# Patient Record
Sex: FEMALE | HISPANIC OR LATINO | Employment: UNEMPLOYED | URBAN - METROPOLITAN AREA
[De-identification: names, ages, dates, MRNs, and addresses within clinical notes are randomized per-mention and may not be internally consistent; named-entity substitution may affect disease eponyms.]

---

## 2017-01-03 ENCOUNTER — RESOLUTE PROFESSIONAL BILLING HOSPITAL PROF FEE (OUTPATIENT)
Dept: MEDSURG UNIT | Facility: MEDICAL CENTER | Age: 32
End: 2017-01-03
Payer: MEDICAID

## 2017-01-03 ENCOUNTER — APPOINTMENT (OUTPATIENT)
Dept: RADIOLOGY | Facility: MEDICAL CENTER | Age: 32
DRG: 872 | End: 2017-01-03
Attending: INTERNAL MEDICINE
Payer: MEDICAID

## 2017-01-03 ENCOUNTER — HOSPITAL ENCOUNTER (INPATIENT)
Facility: MEDICAL CENTER | Age: 32
LOS: 16 days | DRG: 872 | End: 2017-01-19
Attending: EMERGENCY MEDICINE | Admitting: INTERNAL MEDICINE
Payer: MEDICAID

## 2017-01-03 DIAGNOSIS — L03.211 FACIAL CELLULITIS: ICD-10-CM

## 2017-01-03 PROBLEM — S02.85XA ORBITAL FRACTURE (HCC): Status: ACTIVE | Noted: 2017-01-03

## 2017-01-03 LAB
ALBUMIN SERPL BCP-MCNC: 4.3 G/DL (ref 3.2–4.9)
ALBUMIN/GLOB SERPL: 1.3 G/DL
ALP SERPL-CCNC: 80 U/L (ref 30–99)
ALT SERPL-CCNC: 8 U/L (ref 2–50)
AMPHET UR QL SCN: POSITIVE
ANION GAP SERPL CALC-SCNC: 11 MMOL/L (ref 0–11.9)
AST SERPL-CCNC: 13 U/L (ref 12–45)
BARBITURATES UR QL SCN: NEGATIVE
BASOPHILS # BLD AUTO: 0.3 % (ref 0–1.8)
BASOPHILS # BLD: 0.05 K/UL (ref 0–0.12)
BENZODIAZ UR QL SCN: NEGATIVE
BILIRUB SERPL-MCNC: 0.5 MG/DL (ref 0.1–1.5)
BUN SERPL-MCNC: 5 MG/DL (ref 8–22)
BZE UR QL SCN: NEGATIVE
CALCIUM SERPL-MCNC: 9.5 MG/DL (ref 8.5–10.5)
CANNABINOIDS UR QL SCN: NEGATIVE
CHLORIDE SERPL-SCNC: 101 MMOL/L (ref 96–112)
CO2 SERPL-SCNC: 22 MMOL/L (ref 20–33)
CREAT SERPL-MCNC: 0.54 MG/DL (ref 0.5–1.4)
CRP SERPL HS-MCNC: 1.43 MG/DL (ref 0–0.75)
EOSINOPHIL # BLD AUTO: 0.01 K/UL (ref 0–0.51)
EOSINOPHIL NFR BLD: 0.1 % (ref 0–6.9)
ERYTHROCYTE [DISTWIDTH] IN BLOOD BY AUTOMATED COUNT: 41.2 FL (ref 35.9–50)
ERYTHROCYTE [SEDIMENTATION RATE] IN BLOOD BY WESTERGREN METHOD: 33 MM/HOUR (ref 0–20)
GFR SERPL CREATININE-BSD FRML MDRD: >60 ML/MIN/1.73 M 2
GLOBULIN SER CALC-MCNC: 3.2 G/DL (ref 1.9–3.5)
GLUCOSE SERPL-MCNC: 124 MG/DL (ref 65–99)
HCG SERPL QL: NEGATIVE
HCT VFR BLD AUTO: 45.8 % (ref 37–47)
HGB BLD-MCNC: 14.1 G/DL (ref 12–16)
IMM GRANULOCYTES # BLD AUTO: 0.05 K/UL (ref 0–0.11)
IMM GRANULOCYTES NFR BLD AUTO: 0.3 % (ref 0–0.9)
LYMPHOCYTES # BLD AUTO: 0.83 K/UL (ref 1–4.8)
LYMPHOCYTES NFR BLD: 4.7 % (ref 22–41)
MAGNESIUM SERPL-MCNC: 1.8 MG/DL (ref 1.5–2.5)
MCH RBC QN AUTO: 27.9 PG (ref 27–33)
MCHC RBC AUTO-ENTMCNC: 30.8 G/DL (ref 33.6–35)
MCV RBC AUTO: 90.7 FL (ref 81.4–97.8)
MDMA UR QL SCN: ABNORMAL
METHADONE UR QL SCN: NEGATIVE
MONOCYTES # BLD AUTO: 1.05 K/UL (ref 0–0.85)
MONOCYTES NFR BLD AUTO: 6 % (ref 0–13.4)
NEUTROPHILS # BLD AUTO: 15.59 K/UL (ref 2–7.15)
NEUTROPHILS NFR BLD: 88.6 % (ref 44–72)
NRBC # BLD AUTO: 0 K/UL
NRBC BLD AUTO-RTO: 0 /100 WBC
OPIATES UR QL SCN: POSITIVE
OXYCODONE UR QL SCN: NEGATIVE
PCP UR QL SCN: NEGATIVE
PLATELET # BLD AUTO: 266 K/UL (ref 164–446)
PMV BLD AUTO: 8.7 FL (ref 9–12.9)
POTASSIUM SERPL-SCNC: 4 MMOL/L (ref 3.6–5.5)
PROPOXYPH UR QL SCN: NEGATIVE
PROT SERPL-MCNC: 7.5 G/DL (ref 6–8.2)
RBC # BLD AUTO: 5.05 M/UL (ref 4.2–5.4)
SODIUM SERPL-SCNC: 134 MMOL/L (ref 135–145)
WBC # BLD AUTO: 17.6 K/UL (ref 4.8–10.8)

## 2017-01-03 PROCEDURE — 700105 HCHG RX REV CODE 258

## 2017-01-03 PROCEDURE — 70482 CT ORBIT/EAR/FOSSA W/O&W/DYE: CPT

## 2017-01-03 PROCEDURE — 700105 HCHG RX REV CODE 258: Performed by: INTERNAL MEDICINE

## 2017-01-03 PROCEDURE — 700105 HCHG RX REV CODE 258: Performed by: HOSPITALIST

## 2017-01-03 PROCEDURE — 96365 THER/PROPH/DIAG IV INF INIT: CPT

## 2017-01-03 PROCEDURE — 3E0234Z INTRODUCTION OF SERUM, TOXOID AND VACCINE INTO MUSCLE, PERCUTANEOUS APPROACH: ICD-10-PCS | Performed by: EMERGENCY MEDICINE

## 2017-01-03 PROCEDURE — 90471 IMMUNIZATION ADMIN: CPT

## 2017-01-03 PROCEDURE — 700101 HCHG RX REV CODE 250: Performed by: EMERGENCY MEDICINE

## 2017-01-03 PROCEDURE — G0480 DRUG TEST DEF 1-7 CLASSES: HCPCS

## 2017-01-03 PROCEDURE — 36415 COLL VENOUS BLD VENIPUNCTURE: CPT

## 2017-01-03 PROCEDURE — 0H91XZZ DRAINAGE OF FACE SKIN, EXTERNAL APPROACH: ICD-10-PCS | Performed by: EMERGENCY MEDICINE

## 2017-01-03 PROCEDURE — 99285 EMERGENCY DEPT VISIT HI MDM: CPT

## 2017-01-03 PROCEDURE — 700102 HCHG RX REV CODE 250 W/ 637 OVERRIDE(OP): Performed by: EMERGENCY MEDICINE

## 2017-01-03 PROCEDURE — 84703 CHORIONIC GONADOTROPIN ASSAY: CPT

## 2017-01-03 PROCEDURE — A9270 NON-COVERED ITEM OR SERVICE: HCPCS | Performed by: EMERGENCY MEDICINE

## 2017-01-03 PROCEDURE — 96361 HYDRATE IV INFUSION ADD-ON: CPT

## 2017-01-03 PROCEDURE — 85025 COMPLETE CBC W/AUTO DIFF WBC: CPT

## 2017-01-03 PROCEDURE — 700111 HCHG RX REV CODE 636 W/ 250 OVERRIDE (IP): Performed by: EMERGENCY MEDICINE

## 2017-01-03 PROCEDURE — 700111 HCHG RX REV CODE 636 W/ 250 OVERRIDE (IP): Performed by: HOSPITALIST

## 2017-01-03 PROCEDURE — 87040 BLOOD CULTURE FOR BACTERIA: CPT

## 2017-01-03 PROCEDURE — 770006 HCHG ROOM/CARE - MED/SURG/GYN SEMI*

## 2017-01-03 PROCEDURE — 90715 TDAP VACCINE 7 YRS/> IM: CPT | Performed by: EMERGENCY MEDICINE

## 2017-01-03 PROCEDURE — 80053 COMPREHEN METABOLIC PANEL: CPT

## 2017-01-03 PROCEDURE — 87186 SC STD MICRODIL/AGAR DIL: CPT

## 2017-01-03 PROCEDURE — 87077 CULTURE AEROBIC IDENTIFY: CPT

## 2017-01-03 PROCEDURE — 96375 TX/PRO/DX INJ NEW DRUG ADDON: CPT

## 2017-01-03 PROCEDURE — 700111 HCHG RX REV CODE 636 W/ 250 OVERRIDE (IP)

## 2017-01-03 PROCEDURE — 80307 DRUG TEST PRSMV CHEM ANLYZR: CPT

## 2017-01-03 PROCEDURE — 700117 HCHG RX CONTRAST REV CODE 255: Performed by: EMERGENCY MEDICINE

## 2017-01-03 PROCEDURE — 86140 C-REACTIVE PROTEIN: CPT

## 2017-01-03 PROCEDURE — 85652 RBC SED RATE AUTOMATED: CPT

## 2017-01-03 PROCEDURE — 96367 TX/PROPH/DG ADDL SEQ IV INF: CPT

## 2017-01-03 PROCEDURE — 83735 ASSAY OF MAGNESIUM: CPT

## 2017-01-03 RX ORDER — ACETAMINOPHEN 325 MG/1
650 TABLET ORAL EVERY 6 HOURS PRN
Status: DISCONTINUED | OUTPATIENT
Start: 2017-01-03 | End: 2017-01-19 | Stop reason: HOSPADM

## 2017-01-03 RX ORDER — IBUPROFEN 200 MG
400 TABLET ORAL EVERY 6 HOURS PRN
COMMUNITY
End: 2017-11-14

## 2017-01-03 RX ORDER — KETOROLAC TROMETHAMINE 30 MG/ML
30 INJECTION, SOLUTION INTRAMUSCULAR; INTRAVENOUS ONCE
Status: COMPLETED | OUTPATIENT
Start: 2017-01-03 | End: 2017-01-03

## 2017-01-03 RX ORDER — SODIUM CHLORIDE 9 MG/ML
1000 INJECTION, SOLUTION INTRAVENOUS
Status: COMPLETED | OUTPATIENT
Start: 2017-01-03 | End: 2017-01-04

## 2017-01-03 RX ORDER — LABETALOL HYDROCHLORIDE 5 MG/ML
10 INJECTION, SOLUTION INTRAVENOUS EVERY 6 HOURS PRN
Status: DISCONTINUED | OUTPATIENT
Start: 2017-01-03 | End: 2017-01-19 | Stop reason: HOSPADM

## 2017-01-03 RX ORDER — LIDOCAINE HYDROCHLORIDE 20 MG/ML
20 INJECTION, SOLUTION INFILTRATION; PERINEURAL ONCE
Status: COMPLETED | OUTPATIENT
Start: 2017-01-03 | End: 2017-01-03

## 2017-01-03 RX ORDER — DEXTROSE MONOHYDRATE 25 G/50ML
25 INJECTION, SOLUTION INTRAVENOUS
Status: DISCONTINUED | OUTPATIENT
Start: 2017-01-03 | End: 2017-01-19 | Stop reason: HOSPADM

## 2017-01-03 RX ORDER — ENEMA 19; 7 G/133ML; G/133ML
1 ENEMA RECTAL
Status: DISCONTINUED | OUTPATIENT
Start: 2017-01-04 | End: 2017-01-19 | Stop reason: HOSPADM

## 2017-01-03 RX ORDER — SODIUM CHLORIDE 9 MG/ML
2000 INJECTION, SOLUTION INTRAVENOUS CONTINUOUS
Status: DISCONTINUED | OUTPATIENT
Start: 2017-01-03 | End: 2017-01-06

## 2017-01-03 RX ORDER — AMOXICILLIN 250 MG
1 CAPSULE ORAL NIGHTLY
Status: DISCONTINUED | OUTPATIENT
Start: 2017-01-04 | End: 2017-01-19 | Stop reason: HOSPADM

## 2017-01-03 RX ORDER — AMOXICILLIN 250 MG
1 CAPSULE ORAL
Status: DISCONTINUED | OUTPATIENT
Start: 2017-01-04 | End: 2017-01-19 | Stop reason: HOSPADM

## 2017-01-03 RX ORDER — DOCUSATE SODIUM 100 MG/1
100 CAPSULE, LIQUID FILLED ORAL EVERY MORNING
Status: DISCONTINUED | OUTPATIENT
Start: 2017-01-04 | End: 2017-01-19 | Stop reason: HOSPADM

## 2017-01-03 RX ORDER — CLINDAMYCIN PHOSPHATE 900 MG/50ML
900 INJECTION, SOLUTION INTRAVENOUS ONCE
Status: COMPLETED | OUTPATIENT
Start: 2017-01-03 | End: 2017-01-03

## 2017-01-03 RX ORDER — BISACODYL 10 MG
10 SUPPOSITORY, RECTAL RECTAL
Status: DISCONTINUED | OUTPATIENT
Start: 2017-01-04 | End: 2017-01-19 | Stop reason: HOSPADM

## 2017-01-03 RX ORDER — HYDROCODONE BITARTRATE AND ACETAMINOPHEN 5; 325 MG/1; MG/1
2 TABLET ORAL EVERY 6 HOURS PRN
Status: DISCONTINUED | OUTPATIENT
Start: 2017-01-03 | End: 2017-01-19 | Stop reason: HOSPADM

## 2017-01-03 RX ORDER — TOBRAMYCIN AND DEXAMETHASONE 3; 1 MG/ML; MG/ML
1 SUSPENSION/ DROPS OPHTHALMIC EVERY 6 HOURS
Status: DISCONTINUED | OUTPATIENT
Start: 2017-01-03 | End: 2017-01-04

## 2017-01-03 RX ORDER — LACTULOSE 20 G/30ML
30 SOLUTION ORAL
Status: DISCONTINUED | OUTPATIENT
Start: 2017-01-04 | End: 2017-01-19 | Stop reason: HOSPADM

## 2017-01-03 RX ADMIN — CEFTRIAXONE 2 G: 2 INJECTION, POWDER, FOR SOLUTION INTRAMUSCULAR; INTRAVENOUS at 15:41

## 2017-01-03 RX ADMIN — LIDOCAINE HYDROCHLORIDE 20 ML: 20 INJECTION, SOLUTION INFILTRATION; PERINEURAL at 10:30

## 2017-01-03 RX ADMIN — VANCOMYCIN HYDROCHLORIDE 1700 MG: 100 INJECTION, POWDER, LYOPHILIZED, FOR SOLUTION INTRAVENOUS at 16:13

## 2017-01-03 RX ADMIN — KETOROLAC TROMETHAMINE 30 MG: 30 INJECTION, SOLUTION INTRAMUSCULAR; INTRAVENOUS at 11:21

## 2017-01-03 RX ADMIN — IOHEXOL 75 ML: 350 INJECTION, SOLUTION INTRAVENOUS at 14:37

## 2017-01-03 RX ADMIN — CLINDAMYCIN IN 5 PERCENT DEXTROSE 900 MG: 18 INJECTION, SOLUTION INTRAVENOUS at 11:22

## 2017-01-03 RX ADMIN — SODIUM CHLORIDE 1000 ML: 9 INJECTION, SOLUTION INTRAVENOUS at 13:34

## 2017-01-03 RX ADMIN — HYDROCODONE BITARTRATE AND ACETAMINOPHEN 2 TABLET: 5; 325 TABLET ORAL at 10:08

## 2017-01-03 RX ADMIN — CLOSTRIDIUM TETANI TOXOID ANTIGEN (FORMALDEHYDE INACTIVATED), CORYNEBACTERIUM DIPHTHERIAE TOXOID ANTIGEN (FORMALDEHYDE INACTIVATED), BORDETELLA PERTUSSIS TOXOID ANTIGEN (GLUTARALDEHYDE INACTIVATED), BORDETELLA PERTUSSIS FILAMENTOUS HEMAGGLUTININ ANTIGEN (FORMALDEHYDE INACTIVATED), BORDETELLA PERTUSSIS PERTACTIN ANTIGEN, AND BORDETELLA PERTUSSIS FIMBRIAE 2/3 ANTIGEN 0.5 ML: 5; 2; 2.5; 5; 3; 5 INJECTION, SUSPENSION INTRAMUSCULAR at 10:06

## 2017-01-03 ASSESSMENT — LIFESTYLE VARIABLES
ALCOHOL_USE: YES
EVER HAD A DRINK FIRST THING IN THE MORNING TO STEADY YOUR NERVES TO GET RID OF A HANGOVER: NO
EVER FELT BAD OR GUILTY ABOUT YOUR DRINKING: NO
TOTAL SCORE: 0
EVER_SMOKED: NEVER
TOTAL SCORE: 0
TOTAL SCORE: 0
HAVE YOU EVER FELT YOU SHOULD CUT DOWN ON YOUR DRINKING: NO
ON A TYPICAL DAY WHEN YOU DRINK ALCOHOL HOW MANY DRINKS DO YOU HAVE: 4
HAVE PEOPLE ANNOYED YOU BY CRITICIZING YOUR DRINKING: NO
CONSUMPTION TOTAL: POSITIVE
EVER_SMOKED: NEVER
HOW MANY TIMES IN THE PAST YEAR HAVE YOU HAD 5 OR MORE DRINKS IN A DAY: 5
AVERAGE NUMBER OF DAYS PER WEEK YOU HAVE A DRINK CONTAINING ALCOHOL: 2

## 2017-01-03 ASSESSMENT — PAIN SCALES - GENERAL
PAINLEVEL_OUTOF10: 0
PAINLEVEL_OUTOF10: 0
PAINLEVEL_OUTOF10: 3

## 2017-01-03 ASSESSMENT — ENCOUNTER SYMPTOMS
LOSS OF CONSCIOUSNESS: 0
SENSORY CHANGE: 0
EYE PAIN: 1
CHILLS: 0
MUSCULOSKELETAL NEGATIVE: 1
EYE DISCHARGE: 0
NAUSEA: 0
DIARRHEA: 0
DIZZINESS: 0
PHOTOPHOBIA: 0
HEADACHES: 0
BLURRED VISION: 0
DOUBLE VISION: 0
SHORTNESS OF BREATH: 0
PSYCHIATRIC NEGATIVE: 1
HEARTBURN: 0
COUGH: 0
TINGLING: 0
FEVER: 0
FOCAL WEAKNESS: 0
NECK PAIN: 0
BLOOD IN STOOL: 0
SPEECH CHANGE: 0
SPUTUM PRODUCTION: 0
EYE REDNESS: 1
ABDOMINAL PAIN: 0
DIAPHORESIS: 0
PALPITATIONS: 0
VOMITING: 0
WEIGHT LOSS: 0
HEADACHES: 1
SORE THROAT: 0

## 2017-01-03 ASSESSMENT — COPD QUESTIONNAIRES
DURING THE PAST 4 WEEKS HOW MUCH DID YOU FEEL SHORT OF BREATH: NONE/LITTLE OF THE TIME
COPD SCREENING SCORE: 0
HAVE YOU SMOKED AT LEAST 100 CIGARETTES IN YOUR ENTIRE LIFE: NO/DON'T KNOW
DO YOU EVER COUGH UP ANY MUCUS OR PHLEGM?: NO/ONLY WITH OCCASIONAL COLDS OR INFECTIONS

## 2017-01-03 NOTE — ED PROVIDER NOTES
ED Provider Note    Scribed for Kev Castrejon M.D. by Josefa Garza. 1/3/2017, 9:57 AM.    Primary care provider: Pcp Pt States None  Means of arrival: Walk-in   History obtained from: Patient   History limited by: None     CHIEF COMPLAINT  Chief Complaint   Patient presents with   • Lump     forehead       HPI  Rola Carrillo is a 31 y.o. female who presents to the Emergency Department with a swollen lump to the left forehead above the eye. Patient states this began as a small pimple-like bump. This morning she felt pain to the left side of her forehead now by her ear.  The. Her swelling has greatly worsened since onset yesterday. Currently, the entire left eye area is swollen. Her pain is not exacerbated when she moves her eye.  As a fevers or chills.  Denies any visual changes.  Denies any headache.    Patient is allergic to penicillin. She denies history of diabetes mellitus or hypertension. She has not had a tetanus shot in the last 5 years.     REVIEW OF SYSTEMS  Review of Systems   HENT:        Positive swelling to left forehead above eye.    Eyes: Positive for pain.   All other systems reviewed and are negative.      PAST MEDICAL HISTORY   has a past medical history of Anxiety (02/2010) and Depression (02/2010).    SURGICAL HISTORY   has past surgical history that includes repeat c section (5/31/2012).    SOCIAL HISTORY  Social History   Substance Use Topics   • Smoking status: Never Smoker    • Smokeless tobacco: Never Used   • Alcohol Use: No      History   Drug Use   • 2.00 per week   • Special: Cocaine, Methamphetamines, Oral,      Comment: clean x4 months, hx of meth       FAMILY HISTORY  Family History   Problem Relation Age of Onset   • Hypertension Mother    • Diabetes Maternal Grandmother      pills   • Hypertension Maternal Grandmother    • Heart Disease Maternal Grandmother    • Diabetes Maternal Grandfather      pills   • Stroke Maternal Grandfather    • Heart Disease Maternal Grandfather   "  • Diabetes Maternal Aunt      pills   • Diabetes Maternal Aunt      pills   • Cancer Child      neuroblastoma       CURRENT MEDICATIONS  No current facility-administered medications on file prior to encounter.     No current outpatient prescriptions on file prior to encounter.       ALLERGIES  Allergies   Allergen Reactions   • Amoxicillin Hives and Swelling   • Penicillins Hives and Swelling       PHYSICAL EXAM  VITAL SIGNS: /76 mmHg  Pulse 88  Temp(Src) 36.5 °C (97.7 °F) (Temporal)  Resp 18  Ht 1.575 m (5' 2.01\")  Wt 68.9 kg (151 lb 14.4 oz)  BMI 27.78 kg/m2  SpO2 100%  LMP 09/18/2011  Vitals reviewed.  Constitutional: Well developed, Well nourished, No acute distress, Non-toxic appearance.   HENT: Normocephalic, Atraumatic, Bilateral external ears normal, Oropharynx moist, No oral exudates, Nose normal.  She has multiple or head on the left side near the hairline.  With some erythema over the forehead.  This stays above the eyebrow.  This is swelling the tracks down her hair on the left side.  She is swellingoftheuppereyelidoraroundtheeye.  Eyes: PERRL, EOMI, Conjunctiva normal, No discharge. Swelling above left eye.  Eye exam is normal Neck: Normal range of motion, No tenderness, Supple, No stridor.   Cardiovascular: Normal heart rate, Normal rhythm, No murmurs, No rubs, No gallops.   Thorax & Lungs: Normal breath sounds, No respiratory distress, No wheezing   Abdomen: Bowel sounds normal, Soft, No tenderness  Skin: Warm, Dry, No erythema, No rash.   Back: No tenderness, No CVA tenderness.   Neurologic: Awake, alert, nontoxic, moves all extremities symmetrically.  No focal deficits  Psychiatric: Affect normal    COURSE & MEDICAL DECISION MAKING  Pertinent Labs & Imaging studies reviewed. (See chart for details)    =    9:57 AM Patient seen and examined at bedside. The patient presents with a lump to the left forehead above the eye, and the differential diagnosis includes but is not limited to, " facial cellulitis with abscess.*. Patient will be treated with Toradol 30 mg IV, Cleocin 900 mg IV, Xylocaine 2% 20 mL IV, Norco 325 mg oral, and Adacel 0.5 mL IV for her symptoms.      10:43 AM Upon reassessment, Xylocaine 2% injection was given.  Patient is a small abscess.  DX she received some pain medicine.  Small amount of pus was expressed.    12:43 PM Upon reassessment, patient is resting in bed. Patient reports feeling improved, however, her swelling appears to be exacerbated.  Patient was here for couple hours.  She was here for a couple of hours, states usual type.  She was up all night.  She received pain medicines and over the course of her stay, she felt much better, was awake, alert, had a clear sensorium..  I, of course, considered a facial cellulitis versus a more ominous process like a sinus thrombosis, or orbital or periorbital celluliti  12:50 PM Paged R internal medicine.     12:52 PM Paged hospitalist.     2:55 PM Patient has a pressure of 26 in the left eyes.  Clinical she showed no signs initially orbital cellulitis.  The eye itself was not red, or chemotic.  It was not proptotic and she had no real swelling to the upper eyelid.  While here, she developed swollen to the upper eyelid the point where she could barely open her eye and on reassessment, her eye appears much more chemotic.  She'll receive antibiotics will be admitted for facial cellulitis.  CT was added and it was that she might have some inflammation in the orbital area concerning for periorbital or retro-orbital cellulitis.  She is given broader spectrum antibiotics.  Phology was consulted.    3:03 PM Paged ophthalmology.      3:04 PM I left a message for Dr. García (ophthalmology) regarding the patient's condition.     3:30 PM I discussed the patient's condition with [facility] internal medicine who will admit the patient.     Ophthalmology will see the patient.  She may have orbital cellulitis.  Pressure was obtained by me with a  Julio C-Pen on multiple occasions was at 26-29 range with an error of less than 5%.  She has no eye pain at 330 p.m., she has normal range of motion of the eye without signs of cranial nerve palsy, and, only mildly elevated pressure.  She does appear proptotic on CT.  She'll be admitted in guarded condition for workup and treatment of orbital cellulitis.  She'll be treated aggressively with antibiotics.    DISPOSITION:  Patient will be admitted to [facility] [place] internal medicine in guarded condition.      FINAL IMPRESSION  1. Facial cellulitis          Josefa ARENAS (Scribe), am scribing for, and in the presence of, Kev Castrejon M.D..    Electronically signed by: Josefa Garza ([place]), 1/3/2017    Kev ARENAS M.D. personally performed the services described in this documentation, as scribed by Josefa Garza in my presence, and it is both accurate and complete.    The note accurately reflects work and decisions made by me.  Kev Castrejon  1/3/2017  6:45 PM

## 2017-01-03 NOTE — PROGRESS NOTES
"Pharmacy Kinetics 31 y.o. female on vancomycin day # 1   1/3/2017    Currently on Vancomycin 1700 mg IV x1 followed by 1000 mg iv q8hr    Indication for Treatment: rule out orbital cellulitis    Pertinent history per medical record: Admitted on 1/3/2017 for facial cellulitis. Patient with left forehead swelling above eye. She thought it was a pimple, but today is much larger and painful. CT orbits with possible cellulitis.    Other antibiotics:  Ceftriaxone 2 g IV q24h    Allergies: Amoxicillin and Penicillins     List concerns for renal function: none at this time    Pertinent cultures to date:   Blood cx ordered, but not yet collected    CT orbit 1/3/17:  1.  Induration of the left preorbital, supraorbital and infraorbital soft tissues as well as some asymmetric enlargement of the left extraocular muscles and some inflammatory stranding of the intraconal fat most likely representing orbital cellulitis. There is no evidence of post septal fluid collection.    Recent Labs      17   1055   WBC  17.6*   NEUTSPOLYS  88.60*     Recent Labs      17   1055   BUN  5*   CREATININE  0.54   ALBUMIN  4.3     Blood pressure 123/76, pulse 83, temperature 36.5 °C (97.7 °F), temperature source Temporal, resp. rate 22, height 1.575 m (5' 2.01\"), weight 68.9 kg (151 lb 14.4 oz), last menstrual period 2011, SpO2 100 %. Temp (24hrs), Av.5 °C (97.7 °F), Min:36.5 °C (97.7 °F), Max:36.5 °C (97.7 °F)      A/P   1. Vancomycin dose change: new start  2. Next vancomycin level:  2-3 days (not yet started)  3. Goal trough:  12-16 mcg/mL  4. Comments: Patient with questionable facial cellulitis. Young with no real risk of accumulation. Will start vanco per protocol and hope to de-escalate in a few days.    Kali Tate, PharmD, BCPS        "

## 2017-01-03 NOTE — ED NOTES
Patient sleeping when RN in room, responds to verbal stimuli. Patient states did not sleep well last night.

## 2017-01-03 NOTE — IP AVS SNAPSHOT
1/19/2017          Rola Carrillo  [address] [place] [address]    Dear Rola:    [facility] wants to ensure your discharge home is safe and you or your loved ones have had all your questions answered regarding your care after you leave the hospital.    You may receive a telephone call within two days of your discharge.  This call is to make certain you understand your discharge instructions as well as ensure we provided you with the best care possible during your stay with us.     The call will only last approximately 3-5 minutes and will be done by a nurse.    Once again, we want to ensure your discharge home is safe and that you have a clear understanding of any next steps in your care.  If you have any questions or concerns, please do not hesitate to contact us, we are here for you.  Thank you for choosing [facility] for your healthcare needs.    Sincerely,    Gerald Mac    [facility]

## 2017-01-03 NOTE — IP AVS SNAPSHOT
" After Visit Summary                                                                                                                  Name:Rola Carrillo  Medical Record Number:[id number]  CSN: [id number]    YOB: 1985   Age: 31 y.o.  Sex: female  HT:1.575 m (5' 2.01\") WT: 68.9 kg (151 lb 14.4 oz)          Admit Date: 1/3/2017     Discharge Date:   Today's Date: 1/19/2017  Attending Doctor:  DONA العراقي*                  Allergies:  Amoxicillin and Penicillins            Discharge Instructions       Discharge Instructions    Discharged to home by car with relative. Discharged via walking, hospital escort: Yes.  Special equipment needed: Not Applicable    Be sure to schedule a follow-up appointment with your primary care doctor or any specialists as instructed.     Discharge Plan:   Diet Plan: Discussed (regular)  Activity Level: Discussed (as tolerated)  Confirmed Follow up Appointment: Patient to Call and Schedule Appointment  Confirmed Symptoms Management: Discussed  Medication Reconciliation Updated: Yes  Influenza Vaccine Indication: Patient Refuses    I understand that a diet low in cholesterol, fat, and sodium is recommended for good health. Unless I have been given specific instructions below for another diet, I accept this instruction as my diet prescription.   Other diet: regular    Special Instructions: None    · Is patient discharged on Warfarin / Coumadin?   No     · Is patient Post Blood Transfusion?  No    Depression / Suicide Risk    As you are discharged from this [facility] facility, it is important to learn how to keep safe from harming yourself.    Recognize the warning signs:  · Abrupt changes in personality, positive or negative- including increase in energy   · Giving away possessions  · Change in eating patterns- significant weight changes-  positive or negative  · Change in sleeping patterns- unable to sleep or sleeping all the time   · Unwillingness or inability " to communicate  · Depression  · Unusual sadness, discouragement and loneliness  · Talk of wanting to die  · Neglect of personal appearance   · Rebelliousness- reckless behavior  · Withdrawal from people/activities they love  · Confusion- inability to concentrate     If you or a loved one observes any of these behaviors or has concerns about self-harm, here's what you can do:  · Talk about it- your feelings and reasons for harming yourself  · Remove any means that you might use to hurt yourself (examples: pills, rope, extension cords, firearm)  · Get professional help from the community (Mental Health, Substance Abuse, psychological counseling)  · Do not be alone:Call your Safe Contact- someone whom you trust who will be there for you.  · Call your local CRISIS HOTLINE 424-8731 or 369-679-9069  · Call your local Children's Mobile Crisis Response Team [place] (598) 922-7331 or www.[employer] [facility]  · Call the toll free National Suicide Prevention Hotlines   · National Suicide Prevention Lifeline 674-523-AIOQ (1877)  · [employer] Hope Line Network 800-SUICIDE (415-9297)        Cellulitis  Cellulitis is an infection of the skin and the tissue beneath it. The infected area is usually red and tender. Cellulitis occurs most often in the arms and lower legs.   CAUSES   Cellulitis is caused by bacteria that enter the skin through cracks or cuts in the skin. The most common types of bacteria that cause cellulitis are staphylococci and streptococci.  SIGNS AND SYMPTOMS   · Redness and warmth.  · Swelling.  · Tenderness or pain.  · Fever.  DIAGNOSIS   Your health care provider can usually determine what is wrong based on a physical exam. Blood tests may also be done.  TREATMENT   Treatment usually involves taking an antibiotic medicine.  HOME CARE INSTRUCTIONS   · Take your antibiotic medicine as directed by your health care provider. Finish the antibiotic even if you start to feel better.  · Keep the infected arm or leg  elevated to reduce swelling.  · Apply a warm cloth to the affected area up to 4 times per day to relieve pain.  · Take medicines only as directed by your health care provider.  · Keep all follow-up visits as directed by your health care provider.  SEEK MEDICAL CARE IF:   · You notice red streaks coming from the infected area.  · Your red area gets larger or turns dark in color.  · Your bone or joint underneath the infected area becomes painful after the skin has healed.  · Your infection returns in the same area or another area.  · You notice a swollen bump in the infected area.  · You develop new symptoms.  · You have a fever.  SEEK IMMEDIATE MEDICAL CARE IF:   · You feel very sleepy.  · You develop vomiting or diarrhea.  · You have a general ill feeling (malaise) with muscle aches and pains.  MAKE SURE YOU:   · Understand these instructions.  · Will watch your condition.  · Will get help right away if you are not doing well or get worse.     This information is not intended to replace advice given to you by your health care provider. Make sure you discuss any questions you have with your health care provider.     Document Released: 09/27/2006 Document Revised: 01/08/2016 Document Reviewed: 03/04/2013  [employer] Interactive Patient Education ©2016 [employer]      MRSA Infection, Adult  MRSA stands for methicillin-resistant Staphylococcus aureus. This type of infection is caused by Staphylococcus aureus bacteria that are no longer affected by the medicines used to kill them (drug resistant). Staphylococcus (staph) bacteria are normally found on the skin or in the nose of healthy people. In most cases, these bacteria do not cause infection. But if these resistant bacteria enter your body through a cut or sore, they can cause a serious infection on your skin or in other parts of your body. There is a slight chance that the staph on your skin or in your nose is MRSA.  There are two types of MRSA  infections:  · Hospital-acquired MRSA is bacteria that you get in the hospital.  · Community-acquired MRSA is bacteria that you get somewhere other than in a hospital.  RISK FACTORS  Hospital-acquired MRSA is more common. You could be at risk for this infection if you are in the hospital and you:  · Have surgery or a procedure.  · Have an IV access or a catheter tube placed in your body.  · Have weak resistance to germs (weakened immune system).  · Are elderly.  · Are on kidney dialysis.  You could be at risk for community-acquired MRSA if you have a break in your skin and come into contact with MRSA. This may happen if you:  · Play sports where there is skin-to-skin contact.  · Live in a crowded setting, like a dormitory or a  barracks.  · Share towels, razors, or sports equipment with other people.  SYMPTOMS   Symptoms of hospital-acquired MRSA depend on where MRSA has spread. Symptoms may include:  · Wound infection.  · Skin infection.  · Rash.  · Pneumonia.  · Fever and chills.  · Difficulty breathing.  · Chest pain.   Community-acquired MRSA is most likely to start as a scratch or cut that becomes infected. Symptoms may include:  · A pus-filled pimple.  · A boil on your skin.  · Pus draining from your skin.  · A sore (abscess) under your skin or somewhere in your body.  · Fever with or without chills.  DIAGNOSIS   The diagnosis of MRSA is made by taking a sample from an infected area and sending it to a lab for testing. A  can grow (culture) MRSA and check it under a microscope. The cultured MRSA can be tested to see which type of antibiotic medicine will work to treat it. Newer tests can identify MRSA more quickly by testing bacteria samples for MRSA genes. Your health care provider can diagnose MRSA using samples from:   · Cuts or wounds in infected areas.  · Nasal swabs.  · Saliva or cough specimens from deep in the lungs (sputum).  · Urine.  · Blood.  You may also have:  · Imaging  studies (such as X-ray or MRI) to check if the infection has spread to the lungs, bones, or joints.  · A culture and sensitivity test of blood or fluids from inside the joints.  TREATMENT   Treatment depends on how severe, deep, or extensive the infection is. Very bad infections may require a hospital stay.  · Some skin infections, such as a small boil or sore (abscess), may be treated by draining pus from the site of the infection.  · More extensive surgery to drain pus may be necessary for deeper or more widespread soft tissue infections.  · You may then have to take antibiotic medicine given by mouth or through a vein. You may start antibiotic treatment right away or after testing can be done to see what antibiotic medicine should be used.  HOME CARE INSTRUCTIONS   · Take your antibiotics as directed by your health care provider. Take the medicine as prescribed until it is finished.  · Avoid close contact with those around you as much as possible. Do not use towels, razors, toothbrushes, bedding, or other items that will be used by others.  · Wash your hands frequently for 15 seconds with soap and water. Dry your hands with a clean or disposable towel.  · When you are not able to wash your hands, use hand  that is more than 60 percent alcohol.  · Wash towels, sheets, or clothes in the washing machine with detergent and hot water. Dry them in a hot dryer.  · Follow your health care provider's instructions for wound care. Wash your hands before and after changing your bandages.  · Always shower after exercising.  · Keep all cuts and scrapes clean and covered with a bandage.  · Be sure to tell all your health care providers that you have MRSA so they are aware of your infection.  SEEK MEDICAL CARE IF:  · You have a cut, scrape, pimple, or boil that becomes red, swollen, or painful or has pus in it.  · You have pus draining from your skin.  · You have an abscess under your skin or somewhere in your  body.  SEEK IMMEDIATE MEDICAL CARE IF:   · You have symptoms of a skin infection with a fever or chills.  · You have trouble breathing.  · You have chest pain.  · You have a skin wound and you become nauseous or start vomiting.  MAKE SURE YOU:  · Understand these instructions.  · Will watch your condition.  · Will get help right away if you are not doing well or get worse.     This information is not intended to replace advice given to you by your health care provider. Make sure you discuss any questions you have with your health care provider.     Document Released: 12/18/2006 Document Revised: 05/03/2016 Document Reviewed: 10/10/2014  [employer] Interactive Patient Education ©2016 [employer]        Follow-up Information     1. Follow up with KECIA Bill On 1/24/2017.    Specialty:  Family Medicine    Why:  Please check in at 8:30am for a 9am appointment. Please bring 2 months of pay stubs, photo ID, proof of address, and list of medications. Please bring any insurance information you may have.    Contact information    [address] [place] [id number] [phone]           Discharge Medication Instructions:    Below are the medications your physician expects you to take upon discharge:    Review all your home medications and newly ordered medications with your doctor and/or pharmacist. Follow medication instructions as directed by your doctor and/or pharmacist.    Please keep your medication list with you and share with your physician.               Medication List      START taking these medications        Instructions    acetaminophen 325 MG Tabs   Last time this was given:  650 mg on 1/19/2017  4:55 AM   Commonly known as:  TYLENOL    Take 2 Tabs by mouth every 6 hours as needed (Mild Pain; (Pain scale 1-3); Temp greater than 100.5 F).   Dose:  650 mg         CONTINUE taking these medications        Instructions    ibuprofen 200 MG Tabs   Commonly known as:  MOTRIN    Take 400 mg by  mouth every 6 hours as needed for Mild Pain.   Dose:  400 mg               Instructions           Diet / Nutrition:    Follow any diet instructions given to you by your doctor or the dietician, including how much salt (sodium) you are allowed each day.    If you are overweight, talk to your doctor about a weight reduction plan.    Activity:    Remain physically active following your doctor's instructions about exercise and activity.    Rest often.     Any time you become even a little tired or short of breath, SIT DOWN and rest.    Worsening Symptoms:    Report any of the following signs and symptoms to the doctor's office immediately:    *Pain of jaw, arm, or neck  *Chest pain not relieved by medication                               *Dizziness or loss of consciousness  *Difficulty breathing even when at rest   *More tired than usual                                       *Bleeding drainage or swelling of surgical site  *Swelling of feet, ankles, legs or stomach                 *Fever (>100ºF)  *Pink or blood tinged sputum  *Weight gain (3lbs/day or 5lbs /week)           *Shock from internal defibrillator (if applicable)  *Palpitations or irregular heartbeats                *Cool and/or numb extremities    Stroke Awareness    Common Risk Factors for Stroke include:    Age  Atrial Fibrillation  Carotid Artery Stenosis  Diabetes Mellitus  Excessive alcohol consumption  High blood pressure  Overweight   Physical inactivity  Smoking    Warning signs and symptoms of a stroke include:    *Sudden numbness or weakness of the face, arm or leg (especially on one side of the body).  *Sudden confusion, trouble speaking or understanding.  *Sudden trouble seeing in one or both eyes.  *Sudden trouble walking, dizziness, loss of balance or coordination.Sudden severe headache with no known cause.    It is very important to get treatment quickly when a stroke occurs. If you experience any of the above warning signs, call 911  immediately.                   Disclaimer         Quit Smoking / Tobacco Use:    I understand the use of any tobacco products increases my chance of suffering from future heart disease or stroke and could cause other illnesses which may shorten my life. Quitting the use of tobacco products is the single most important thing I can do to improve my health. For further information on smoking / tobacco cessation call a Toll Free Quit Line at 1-922.802.7493 (*National [employer] [place]) or 1-828.593.7355 (American Lung Association) or you can access the web based program at www.lungusa.org.    [place] Tobacco Users Help Line:  (239) 188-6192       Toll Free: 1-269.377.8918  Quit Tobacco Program [facility] (010)416-3009    Crisis Hotline:    [place] Crisis Hotline:  3-662-JDPBHIR or 1-539.573.1121    [place] Crisis Hotline:    1-799.961.2847 or 829-519-1541    Discharge Survey:   Thank you for choosing [facility]. We hope we did everything we could to make your hospital stay a pleasant one. You may be receiving a phone survey and we would appreciate your time and participation in answering the questions. Your input is very valuable to us in our efforts to improve our service to our patients and their families.        My signature on this form indicates that:    1. I have reviewed and understand the above information.  2. My questions regarding this information have been answered to my satisfaction.  3. I have formulated a plan with my discharge nurse to obtain my prescribed medications for home.                  Disclaimer         __________________________________                     __________       ________                       Patient Signature                                                 Date                    Time

## 2017-01-03 NOTE — ED NOTES
Pt states bump started yesterday night, thought it was a pimple. Pt c/o increased pain and swelling down to eye and left ear.

## 2017-01-03 NOTE — PROGRESS NOTES
"       [facility] Admitting History and Physical    Name Rola Carrillo     1985   Age/Sex 31 y.o. female   MRN [id number]   Code Status full     After 5PM or if no immediate response to page, please call for cross-coverage  Attending/Team: Douglas/Loco Apple Call [phone] to page   1st Call - Day Intern (R1):   Sara 2nd Call - Day Sr. Resident (R2/R3):   Anabel Henriquez Student: Yesi Yeung MS3    Chief Complaint: \" left eye pain and swelling\"      HPI:  Patient presented to the ER today for worsening left eye pain and swelling. She reports the appearance of a 'pimple' about 6 cm above the outer edge of the eyelid two days ago. She tried to 'pop' the pimple yesteready and has since developed progressive swelling and redness of the area descending over the left eyelid, with acute worsening in the past 24 hours. The nidus/'pimple' expresses no pus and is approximately 1-2mm with evident necrosis. She denies any dizziness, loss of vision, headache, neck pain, fever, nausea, or vomiting and appears non-septic. Patient is allergic to penicillin and amoxicillin, and has a past medical history of high-risk pregnancy with  and meth use (resolved).    Eye exam shows no change in vision and intact EOM intact. It is significant for HIGH tonometry readings of the left eye, above 50 on three separate occasions and above 20 on two repeat calculations (to control for pressure applied to the orbit in the first readings). WBC at 1035 am was 17 but patient does not appear septic at time of exam.    PMH  Medications--none  Allergies--Penicilin and amoxicillin (hives, swelling)  Surgeries-- and tubal ligation (2012)     Soc Hx update: patient states she used ectasy one week ago but does not use it regularly.  She denies any tobacco use, and endorses 1-2 drinks per week.           Review of Systems   Constitutional: Negative for fever, chills and diaphoresis.   Eyes: Positive for pain and " redness. Negative for blurred vision, double vision and discharge.   Respiratory: Negative for shortness of breath.    Cardiovascular: Negative for chest pain and palpitations.   Gastrointestinal: Negative for abdominal pain, diarrhea and blood in stool.   Genitourinary: Negative for dysuria.   Musculoskeletal: Negative for neck pain.   Neurological: Negative for dizziness, tingling, sensory change, speech change, focal weakness, loss of consciousness and headaches.             Past Medical History:   Past Medical History   Diagnosis Date   • Anxiety 02/2010     no meds since 3/2010   • Depression 02/2010     no meds since 3/2010       Past Surgical History:  Past Surgical History   Procedure Laterality Date   • Repeat c section  5/31/2012     Performed by CAROLINA BRYAN at LABOR AND DELIVERY       Current Outpatient Medications:  Home Medications     Reviewed by Angelia Anderson (Pharmacy Tech) on 01/03/17 at 1327  Med List Status: Complete    Medication Last Dose Status    ibuprofen (MOTRIN) 200 MG Tab 1/3/2017 Active                Medication Allergy/Sensitivities:  Allergies   Allergen Reactions   • Amoxicillin Hives and Swelling   • Penicillins Hives and Swelling         Family History:  Family History   Problem Relation Age of Onset   • Diabetes Mother      pills   • Hypertension Mother    • Diabetes Maternal Grandmother      pills   • Hypertension Maternal Grandmother    • Heart Disease Maternal Grandmother    • Diabetes Maternal Grandfather      pills   • Stroke Maternal Grandfather    • Heart Disease Maternal Grandfather    • Diabetes Maternal Aunt      pills   • Diabetes Maternal Aunt      pills   • Cancer Child      neuroblastoma       Social History:  Social History     Social History   • Marital Status: Single     Spouse Name: N/A   • Number of Children: N/A   • Years of Education: N/A     Occupational History   • Not on file.     Social History Main Topics   • Smoking status: Never Smoker    •  "Smokeless tobacco: Never Used   • Alcohol Use: No   • Drug Use: No      Comment: clean x4 months, hx of meth   • Sexual Activity:     Partners: Male     Other Topics Concern   • Not on file     Social History Narrative     PCP : None when asked      Physical Exam     Filed Vitals:    01/03/17 1030 01/03/17 1100 01/03/17 1130 01/03/17 1200   BP:       Pulse: 84 86 83    Temp:       TempSrc:       Resp: 25 25 24 22   Height:       Weight:       SpO2: 100% 99% 100%      Body mass index is 27.78 kg/(m^2).  /76 mmHg  Pulse 83  Temp(Src) 36.5 °C (97.7 °F) (Temporal)  Resp 22  Ht 1.575 m (5' 2.01\")  Wt 68.9 kg (151 lb 14.4 oz)  BMI 27.78 kg/m2  SpO2 100%  LMP 09/18/2011  O2 therapy: Pulse Oximetry: 100 %    Physical Exam   Constitutional: She is well-developed, well-nourished, and in no distress.   Eyes: EOM are normal. Pupils are equal, round, and reactive to light. Left eye exhibits chemosis.       Some proptosis and inner canthus chemosis of the left eye with minimal to no discharge, EOMI. Right eye is normal    Neck: Neck supple. No thyromegaly present.   Cardiovascular: Normal rate and regular rhythm.  Exam reveals no gallop and no friction rub.    No murmur heard.  Normal S1, S2   Pulmonary/Chest: Breath sounds normal.   Lymphadenopathy:     She has no cervical adenopathy.   Neurological: She is alert. She has normal reflexes. She displays normal reflexes. No cranial nerve deficit. She exhibits normal muscle tone. GCS score is 15.   Skin: Skin is warm and dry. No rash noted. There is erythema. No pallor.   Erythema surrounding the left orbit and forehead       I examined the patient 1/3/2017 2:38 PM  Vital Signs:/76 mmHg  Pulse 83  Temp(Src) 36.5 °C (97.7 °F) (Temporal)  Resp 22  Ht 1.575 m (5' 2.01\")  Wt 68.9 kg (151 lb 14.4 oz)  BMI 27.78 kg/m2  SpO2 100%  LMP 09/18/2011  Cardiac examination significant for Regular rate and rhythm  Pulmonary examination significant for Clear lung " fileds  Capillary refill is did not assess  Radial Pulse is 2+  Skin is normal, erythema over the left forehead and left eyelid      Data Review       Old Records Request:   Completed  Current Records review and summary: Completed    Lab Data Review:  Recent Results (from the past 24 hour(s))   CBC WITH DIFFERENTIAL    Collection Time: 01/03/17 10:55 AM   Result Value Ref Range    WBC 17.6 (H) 4.8 - 10.8 K/uL    RBC 5.05 4.20 - 5.40 M/uL    Hemoglobin 14.1 12.0 - 16.0 g/dL    Hematocrit 45.8 37.0 - 47.0 %    MCV 90.7 81.4 - 97.8 fL    MCH 27.9 27.0 - 33.0 pg    MCHC 30.8 (L) 33.6 - 35.0 g/dL    RDW 41.2 35.9 - 50.0 fL    Platelet Count 266 164 - 446 K/uL    MPV 8.7 (L) 9.0 - 12.9 fL    Neutrophils-Polys 88.60 (H) 44.00 - 72.00 %    Lymphocytes 4.70 (L) 22.00 - 41.00 %    Monocytes 6.00 0.00 - 13.40 %    Eosinophils 0.10 0.00 - 6.90 %    Basophils 0.30 0.00 - 1.80 %    Immature Granulocytes 0.30 0.00 - 0.90 %    Nucleated RBC 0.00 /100 WBC    Neutrophils (Absolute) 15.59 (H) 2.00 - 7.15 K/uL    Lymphs (Absolute) 0.83 (L) 1.00 - 4.80 K/uL    Monos (Absolute) 1.05 (H) 0.00 - 0.85 K/uL    Eos (Absolute) 0.01 0.00 - 0.51 K/uL    Baso (Absolute) 0.05 0.00 - 0.12 K/uL    Immature Granulocytes (abs) 0.05 0.00 - 0.11 K/uL    NRBC (Absolute) 0.00 K/uL   COMP METABOLIC PANEL    Collection Time: 01/03/17 10:55 AM   Result Value Ref Range    Sodium 134 (L) 135 - 145 mmol/L    Potassium 4.0 3.6 - 5.5 mmol/L    Chloride 101 96 - 112 mmol/L    Co2 22 20 - 33 mmol/L    Anion Gap 11.0 0.0 - 11.9    Glucose 124 (H) 65 - 99 mg/dL    Bun 5 (L) 8 - 22 mg/dL    Creatinine 0.54 0.50 - 1.40 mg/dL    Calcium 9.5 8.5 - 10.5 mg/dL    AST(SGOT) 13 12 - 45 U/L    ALT(SGPT) 8 2 - 50 U/L    Alkaline Phosphatase 80 30 - 99 U/L    Total Bilirubin 0.5 0.1 - 1.5 mg/dL    Albumin 4.3 3.2 - 4.9 g/dL    Total Protein 7.5 6.0 - 8.2 g/dL    Globulin 3.2 1.9 - 3.5 g/dL    A-G Ratio 1.3 g/dL   ESTIMATED GFR    Collection Time: 01/03/17 10:55 AM   Result  Value Ref Range    GFR If African American >60 >60 mL/min/1.73 m 2    GFR If Non African American >60 >60 mL/min/1.73 m 2       Imaging/Procedures Review:    ndependant Imaging Review: Completed  [id number] WITH & W/O    (Results Pending)                     Assessment/Plan    Ms Carrillo is a 31 year old  female who presented to the ER for eye pain and a two day history of a 'pimple' on the left forehead associated with progressive swelling and erythema extending from the left mid-forehead hairline over the left eyelid. EOMI grossly intact, absent discharge, no change in vision or change in neurological function.     Ddx  * Orbital Cellulitis  *r/o cavernous sinus pathology      Plan:   1. Facial celullitis with orbital involvement    * CTA to r/o orbital cellulitis  *blood cultures (pending)  *empiric abx: vancomycin to cover staph and cetriaxone to cover strep  *consult on-call opthalmology, Dr. García  *UDS and bHcg screen to guide medical management    2. Pain mgmt    * Tylenol           No new assessment & plan notes have been filed under this hospital service since the last note was generated.  Service: MEDICAL      Anticipated Hospital stay:  >2 midnights        Quality Measures    Castellanos catheter: No Castellanos

## 2017-01-03 NOTE — SENIOR ADMIT NOTE
"     * * * SENIOR ADMIT NOTE * * *    31 year old male/ female with no known PMHx presented with left eye pain with redness/ swelling over left eye/forehead.    She noticed a left pimple about 2 days ago, then the redness & swelling graddually got worse over the left side of forehead & eye. Patient stated that she has never had similar problem & she was \"perfectly fine\" 3 days ago.     Denied any fever/ nausea/ vomiting/ headache/ focal neurological deficits/ chest pain/ dyspnea/ abdominal pain/ .   No recent insect bite, injury. No known hx of allergy to maskara / cosmetics. She can barely open her left eye, but claimed that vision is fine at this time. She reported that she could open her left eye without major problems yesterday.    She takes prn advail.     C section & s/p tubal ligation  2012  She is currently menstruating.     ================================================================    Pertinent  physical exam:   * Constitutional: no acute distress   * Neurological: alert and oriented times four   - Cr N II - XII: grossly intact   - left eye -> mild chemosis in medial sclera/ conjunctiva, intact pupillary reflexes   - no photophobia    - EOMI, vision    - fundoscopic exam -> unable to perform properly since patient's eyes roll up every time   - tonometry -> left eye 55 mmHg & right eye 13 mmHg (3 times per note-writer)  - (+) 2 mm necrotic papule over let side of forehead without discharge on active pressure  - motor: 5/5  - sensory: grossly normal   - DTR knees 1+  * Cardiovascular: regular S1/S2  * Pulmnary: regular breath sound  * Abdomen: normoactive bowel sounds, soft, nontender, no hepatosplenomegaly   * Extremities: no cyanosis, clubbing or edema    - (+) equal & synchronized radial & dorsalis pedis pulses on both sides     ================================================================    ASSESSMENT & PLAN:     * Left eye/forehead pain/erythema/swelling  - acute onset x 2 days after \"a pimple\" " appeared on the left side of forehead   - grossly intact extro-ocular muscles & vision   > D/Dx:    - left sided facial cellulitis likely strep given no pus    - left sided pre-septal orbital cellulitis    - unable to exclude cavernous sinus pathology / post-septal cellulitis   - pending BCx   - allergy to pencilliin   - chose vanco to cover MRSA/resistant strep & ceftriaxone to strep/cover G - (low possbility)  - given high IO pressure -> CTA to rule out orbital cellulitis or to unveal any organic lesions   - consulted ophthalmologist Dr García -> left voice message & text   - ctm     For further details, please refer to medical intern Dr Ruiz's H&P.     ================================================================

## 2017-01-03 NOTE — ED NOTES
Patient presents with swelling / cellulitis to left forehead above eye. Yesterday was small and pimple like per patient. Today is larger area, painful to touch. Denies history of similar complaint, denies history of MRSA.

## 2017-01-03 NOTE — H&P
[facility] [place] [facility] Admitting History and Physical    Name Rola Carrillo 1985   Age/Sex 31 y.o. female   MRN [id number]   Code Status Full      After 5PM or if no immediate response to page, please call for cross-coverage  Attending/Team: Douglas/Dr. Apple Call [phone] to page   1st Call - Day Intern (R1):   Dr. Ruiz 2nd Call - Day Sr. Resident (R2/R3):   Dr. Little       Chief Complaint:  Left upper face swelling and pain     HPI:  Patient with no known significant past medical history presented with 1 days of swelling of left upper face involving the eyelid with pain 10/10.  She noticed a pimple over the forehead about 2 days ago on the left side, then the redness & swelling graddually got worse over the left side of forehead and by yesterday night it had involved the upper eyelid on the right with difficulty opening the eyelid completely. Patient stated that she has never had similar problem & she fine 3 days ago. Denied any fever/ nausea/ vomiting/ headache/ focal neurological deficits/ chest pain/ dyspnea/ abdominal pain in the recent past.  No recent insect bite, injury. No known hx of allergy to maskara / cosmetics; however she endorses to changing cosmetics in recent past. She does not report any vision loss. She takes prn Advil.       She is currently menstruating.   Review of Systems   Constitutional: Negative for fever, chills, weight loss, malaise/fatigue and diaphoresis.   HENT: Negative for congestion, ear discharge, ear pain, hearing loss, nosebleeds, sore throat and tinnitus.    Eyes: Positive for redness. Negative for blurred vision, double vision and photophobia.        Periorbital pain; not specifically in the eye.    Respiratory: Negative for cough, sputum production and shortness of breath.    Cardiovascular: Negative for chest pain and palpitations.   Gastrointestinal: Negative for heartburn, nausea, vomiting and abdominal pain.   Genitourinary: Negative.     Musculoskeletal: Negative.    Skin: Positive for rash.   Neurological: Positive for headaches. Negative for dizziness and tingling.   Psychiatric/Behavioral: Negative.              Past Medical History:   Past Medical History   Diagnosis Date   • Anxiety 02/2010     no meds since 3/2010   • Depression 02/2010     no meds since 3/2010       Past Surgical History:  Past Surgical History   Procedure Laterality Date   • Repeat c section  5/31/2012     Performed by CAROLIAN BRYAN at LABOR AND DELIVERY       Current Outpatient Medications:  Home Medications     Reviewed by Mira Khalil PhT (Pharmacy Tech) on 01/03/17 at 1327  Med List Status: Complete    Medication Last Dose Status    ibuprofen (MOTRIN) 200 MG Tab 1/3/2017 Active                Medication Allergy/Sensitivities:  Allergies   Allergen Reactions   • Amoxicillin Hives and Swelling   • Penicillins Hives and Swelling         Family History:  Family History   Problem Relation Age of Onset   • Diabetes Mother      pills   • Hypertension Mother    • Diabetes Maternal Grandmother      pills   • Hypertension Maternal Grandmother    • Heart Disease Maternal Grandmother    • Diabetes Maternal Grandfather      pills   • Stroke Maternal Grandfather    • Heart Disease Maternal Grandfather    • Diabetes Maternal Aunt      pills   • Diabetes Maternal Aunt      pills   • Cancer Child      neuroblastoma       Social History:  Social History     Social History   • Marital Status: Single     Spouse Name: N/A   • Number of Children: N/A   • Years of Education: N/A     Occupational History   • Not on file.     Social History Main Topics   • Smoking status: Never Smoker    • Smokeless tobacco: Never Used   • Alcohol Use: No   • Drug Use: No      Comment: clean x4 months, hx of meth   • Sexual Activity:     Partners: Male     Other Topics Concern   • Not on file     Social History Narrative     Living situation: with family  PCP : None in EPIC      Physical Exam     Filed  "Vitals:    01/03/17 1030 01/03/17 1100 01/03/17 1130 01/03/17 1200   BP:       Pulse: 84 86 83    Temp:       TempSrc:       Resp: 25 25 24 22   Height:       Weight:       SpO2: 100% 99% 100%      Body mass index is 27.78 kg/(m^2).  /76 mmHg  Pulse 83  Temp(Src) 36.5 °C (97.7 °F) (Temporal)  Resp 22  Ht 1.575 m (5' 2.01\")  Wt 68.9 kg (151 lb 14.4 oz)  BMI 27.78 kg/m2  SpO2 100%  LMP 09/18/2011  O2 therapy: Pulse Oximetry: 100 %    Physical Exam   Constitutional: She is well-developed, well-nourished, and in no distress. No distress.   HENT:   Head: Normocephalic and atraumatic.   Mouth/Throat: Oropharynx is clear and moist.   Eyes: EOM are normal. Pupils are equal, round, and reactive to light. Right eye exhibits no discharge. Left eye exhibits no discharge. Right conjunctiva is not injected. Right conjunctiva has no hemorrhage. Left conjunctiva is injected. Left conjunctiva has no hemorrhage. Right eye exhibits normal extraocular motion and no nystagmus. Left eye exhibits normal extraocular motion and no nystagmus. Right pupil is round and reactive. Left pupil is round and reactive. Pupils are equal.       Vision: 20/20 with hand held snellen chart at bedside.   Neck: Normal range of motion. Neck supple.   Abdominal: Soft. Bowel sounds are normal.   Lymphadenopathy:     She has no cervical adenopathy.   Skin: Skin is warm. She is not diaphoretic. There is erythema.   Left forehead erythematous; non tender to palpation with a papule 2-3 mm in diameter with central clotted blood with slight induration; no discharge on pressure over the area  Skin warm over the area.       [unfilled]      Data Review       Old Records Request:   Deferred  Current Records review and summary: Completed    Lab Data Review:  Recent Results (from the past 24 hour(s))   CBC WITH DIFFERENTIAL    Collection Time: 01/03/17 10:55 AM   Result Value Ref Range    WBC 17.6 (H) 4.8 - 10.8 K/uL    RBC 5.05 4.20 - 5.40 M/uL    " Hemoglobin 14.1 12.0 - 16.0 g/dL    Hematocrit 45.8 37.0 - 47.0 %    MCV 90.7 81.4 - 97.8 fL    MCH 27.9 27.0 - 33.0 pg    MCHC 30.8 (L) 33.6 - 35.0 g/dL    RDW 41.2 35.9 - 50.0 fL    Platelet Count 266 164 - 446 K/uL    MPV 8.7 (L) 9.0 - 12.9 fL    Neutrophils-Polys 88.60 (H) 44.00 - 72.00 %    Lymphocytes 4.70 (L) 22.00 - 41.00 %    Monocytes 6.00 0.00 - 13.40 %    Eosinophils 0.10 0.00 - 6.90 %    Basophils 0.30 0.00 - 1.80 %    Immature Granulocytes 0.30 0.00 - 0.90 %    Nucleated RBC 0.00 /100 WBC    Neutrophils (Absolute) 15.59 (H) 2.00 - 7.15 K/uL    Lymphs (Absolute) 0.83 (L) 1.00 - 4.80 K/uL    Monos (Absolute) 1.05 (H) 0.00 - 0.85 K/uL    Eos (Absolute) 0.01 0.00 - 0.51 K/uL    Baso (Absolute) 0.05 0.00 - 0.12 K/uL    Immature Granulocytes (abs) 0.05 0.00 - 0.11 K/uL    NRBC (Absolute) 0.00 K/uL   COMP METABOLIC PANEL    Collection Time: 01/03/17 10:55 AM   Result Value Ref Range    Sodium 134 (L) 135 - 145 mmol/L    Potassium 4.0 3.6 - 5.5 mmol/L    Chloride 101 96 - 112 mmol/L    Co2 22 20 - 33 mmol/L    Anion Gap 11.0 0.0 - 11.9    Glucose 124 (H) 65 - 99 mg/dL    Bun 5 (L) 8 - 22 mg/dL    Creatinine 0.54 0.50 - 1.40 mg/dL    Calcium 9.5 8.5 - 10.5 mg/dL    AST(SGOT) 13 12 - 45 U/L    ALT(SGPT) 8 2 - 50 U/L    Alkaline Phosphatase 80 30 - 99 U/L    Total Bilirubin 0.5 0.1 - 1.5 mg/dL    Albumin 4.3 3.2 - 4.9 g/dL    Total Protein 7.5 6.0 - 8.2 g/dL    Globulin 3.2 1.9 - 3.5 g/dL    A-G Ratio 1.3 g/dL   ESTIMATED GFR    Collection Time: 01/03/17 10:55 AM   Result Value Ref Range    GFR If African American >60 >60 mL/min/1.73 m 2    GFR If Non African American >60 >60 mL/min/1.73 m 2       Imaging/Procedures Review:    ndependant Imaging Review: Completed  [id number] WITH & W/O   Preliminary Result        [id number] WITH & W/O   Final Result      1.  Induration of the left preorbital, supraorbital and infraorbital soft tissues as well as some asymmetric enlargement of the left extraocular  "muscles and some inflammatory stranding of the intraconal fat most likely representing orbital cellulitis.    There is no evidence of post septal fluid collection.      2.  No evidence of paranasal sinus disease.      3.  No evidence of orbital fracture.               EKG:   EKG Independant Review: Deferred                   Assessment/Plan     # Left upper face swelling/erythema involving upper eyelid   ## Orbital Cellulitis from CT orbit  - acute onset x 2 days after pimple appeared on the left side of forehead; progressively involved the left eyelid with swelling and unable to open the eye    - grossly intact extro-ocular muscles; with normal vision 20/20 with hand-held snellen chart    - D/Dx:  left sided facial cellulitis likely strep given no pus vs pre-septal orbital cellulitis vs post septal cellulitis given pain and mild chemosis and very sudden and rapid progression vs cavernous sinus pathology / post-septal cellulitis    - WBC count: 17.6, VS: Blood pressure 123/76, pulse 83, temperature 36.5 °C (97.7 °F), temperature source Temporal, resp. rate 22, height 1.575 m (5' 2.01\"), weight 68.9 kg (151 lb 14.4 oz), last menstrual period 09/18/2011, SpO2 100 %.   - tonometry: 12 RE, 24 LE  Plan:  - CT orbit stat w/wo (after IV fluid bolus for contrast): Result: Induration of the left preorbital, supraorbital and infraorbital soft tissues as well as some asymmetric enlargement of the left extraocular muscles and some inflammatory stranding of the intraconal fat most likely representing orbital cellulitis. There is no evidence of post septal fluid collection.  - pending Blood Cultures    - allergy to pencilliin; started on Vancomycin and Ceftriaxone for MRSA/resistant strep (will watch out for allergy to c3)  - Paged on call Ophthalmology; awaiting recs  - will consider MRA and MRV and MR brain if worsening of eye exam or vision changes  - tylenol prn for pain  - ctm       Anticipated Hospital stay:  >2 " midnights        Quality Measures  Labs reviewed, Medications reviewed and Radiology images reviewed  Castellanos catheter: No Castellanos      DVT Prophylaxis: Enoxaparin (Lovenox)

## 2017-01-03 NOTE — IP AVS SNAPSHOT
" <p align=\"LEFT\"><IMG SRC=\"//[email or web address]\" alt=\"Image\" WIDTH=\"50%\" HEIGHT=\"200\" BORDER=\"\"></p>                   Name:Rola Carrillo  Medical Record Number:[id number]  CSN: [id number]    YOB: 1985   Age: 31 y.o.  Sex: female  HT:1.575 m (5' 2.01\") WT: 68.9 kg (151 lb 14.4 oz)          Admit Date: 1/3/2017     Discharge Date:   Today's Date: 1/19/2017  Attending Doctor:  DONA العراقي*                  Allergies:  Amoxicillin and Penicillins          Follow-up Information     1. Follow up with KECIA Bill On 1/24/2017.    Specialty:  Family Medicine    Why:  Please check in at 8:30am for a 9am appointment. Please bring 2 months of pay stubs, photo ID, proof of address, and list of medications. Please bring any insurance information you may have.    Contact information    [address] [place] [id number] [phone]           Medication List      Take these Medications        Instructions    acetaminophen 325 MG Tabs   Commonly known as:  TYLENOL    Take 2 Tabs by mouth every 6 hours as needed (Mild Pain; (Pain scale 1-3); Temp greater than 100.5 F).   Dose:  650 mg       ibuprofen 200 MG Tabs   Commonly known as:  MOTRIN    Take 400 mg by mouth every 6 hours as needed for Mild Pain.   Dose:  400 mg         "

## 2017-01-03 NOTE — IP AVS SNAPSHOT
[employer] Access Code: [email or web address]  Expires: 2/15/2017 12:35 PM    Your email address is not on file at [facility].  Email Addresses are required for you to sign up for [employer], please contact [phone] to verify your personal information and to provide your email address prior to attempting to register for [employer].    Rola Carrillo  [address] [place] [address]    [employer]  A secure, online tool to manage your health information     [employer]’s [employer]® is a secure, online tool that connects you to your personalized health information from the privacy of your home -- day or night - making it very easy for you to manage your healthcare. Once the activation process is completed, you can even access your medical information using the [employer] marcelino, which is available for free in the Apple Marcelino store or Google Play store.     To learn more about [employer], visit [email or web address]    There are two levels of access available (as shown below):   My Chart Features  [facility] Primary Care Doctor [facility]  Specialists [facility]  Urgent  Care Non-[facility] Primary Care Doctor   Email your healthcare team securely and privately 24/7 X X X    Manage appointments: schedule your next appointment; view details of past/upcoming appointments X      Request prescription refills. X      View recent personal medical records, including lab and immunizations X X X X   View health record, including health history, allergies, medications X X X X   Read reports about your outpatient visits, procedures, consult and ER notes X X X X   See your discharge summary, which is a recap of your hospital and/or ER visit that includes your diagnosis, lab results, and care plan X X  X     How to register for [email or web address]:  Once your e-mail address has been verified, follow the following steps to sign up for [email or web address].     1. Go to  [email or web address]  2. Click on the Sign Up Now box, which takes you to the New Member Sign Up page. You will  need to provide the following information:  a. Enter your [employer] Access Code exactly as it appears at the top of this page. (You will not need to use this code after you’ve completed the sign-up process. If you do not sign up before the expiration date, you must request a new code.)   b. Enter your date of birth.   c. Enter your home email address.   d. Click Submit, and follow the next screen’s instructions.  3. Create a [employer]eebot ID. This will be your [employer] login ID and cannot be changed, so think of one that is secure and easy to remember.  4. Create a [employer] password. You can change your password at any time.  5. Enter your Password Reset Question and Answer. This can be used at a later time if you forget your password.   6. Enter your e-mail address. This allows you to receive e-mail notifications when new information is available in [employer].  7. Click Sign Up. You can now view your health information.    For assistance activating your [employer] account, call [phone]

## 2017-01-04 ENCOUNTER — APPOINTMENT (OUTPATIENT)
Dept: RADIOLOGY | Facility: MEDICAL CENTER | Age: 32
DRG: 872 | End: 2017-01-04
Attending: INTERNAL MEDICINE
Payer: MEDICAID

## 2017-01-04 PROBLEM — H05.012 CELLULITIS OF LEFT ORBIT: Status: ACTIVE | Noted: 2017-01-03

## 2017-01-04 PROBLEM — E05.90 HYPERTHYROIDISM: Status: ACTIVE | Noted: 2017-01-04

## 2017-01-04 LAB
ALBUMIN SERPL BCP-MCNC: 3.6 G/DL (ref 3.2–4.9)
ALBUMIN/GLOB SERPL: 1.3 G/DL
ALP SERPL-CCNC: 65 U/L (ref 30–99)
ALT SERPL-CCNC: 9 U/L (ref 2–50)
ANION GAP SERPL CALC-SCNC: 9 MMOL/L (ref 0–11.9)
AST SERPL-CCNC: 9 U/L (ref 12–45)
BASOPHILS # BLD AUTO: 0.2 % (ref 0–1.8)
BASOPHILS # BLD: 0.03 K/UL (ref 0–0.12)
BILIRUB SERPL-MCNC: 0.6 MG/DL (ref 0.1–1.5)
BUN SERPL-MCNC: 5 MG/DL (ref 8–22)
CALCIUM SERPL-MCNC: 8.6 MG/DL (ref 8.5–10.5)
CHLORIDE SERPL-SCNC: 101 MMOL/L (ref 96–112)
CO2 SERPL-SCNC: 21 MMOL/L (ref 20–33)
CREAT SERPL-MCNC: 0.55 MG/DL (ref 0.5–1.4)
EOSINOPHIL # BLD AUTO: 0.01 K/UL (ref 0–0.51)
EOSINOPHIL NFR BLD: 0.1 % (ref 0–6.9)
ERYTHROCYTE [DISTWIDTH] IN BLOOD BY AUTOMATED COUNT: 39.8 FL (ref 35.9–50)
ETHANOL BLD-MCNC: 0 G/DL
GFR SERPL CREATININE-BSD FRML MDRD: >60 ML/MIN/1.73 M 2
GLOBULIN SER CALC-MCNC: 2.8 G/DL (ref 1.9–3.5)
GLUCOSE SERPL-MCNC: 109 MG/DL (ref 65–99)
GRAM STN SPEC: NORMAL
HCT VFR BLD AUTO: 37.3 % (ref 37–47)
HGB BLD-MCNC: 12.2 G/DL (ref 12–16)
HIV 1+2 AB+HIV1 P24 AG SERPL QL IA: NON REACTIVE
IMM GRANULOCYTES # BLD AUTO: 0.1 K/UL (ref 0–0.11)
IMM GRANULOCYTES NFR BLD AUTO: 0.7 % (ref 0–0.9)
LYMPHOCYTES # BLD AUTO: 0.48 K/UL (ref 1–4.8)
LYMPHOCYTES NFR BLD: 3.3 % (ref 22–41)
MCH RBC QN AUTO: 28.2 PG (ref 27–33)
MCHC RBC AUTO-ENTMCNC: 32.7 G/DL (ref 33.6–35)
MCV RBC AUTO: 86.3 FL (ref 81.4–97.8)
MONOCYTES # BLD AUTO: 0.48 K/UL (ref 0–0.85)
MONOCYTES NFR BLD AUTO: 3.3 % (ref 0–13.4)
NEUTROPHILS # BLD AUTO: 13.27 K/UL (ref 2–7.15)
NEUTROPHILS NFR BLD: 92.4 % (ref 44–72)
NRBC # BLD AUTO: 0 K/UL
NRBC BLD AUTO-RTO: 0 /100 WBC
PLATELET # BLD AUTO: 234 K/UL (ref 164–446)
PMV BLD AUTO: 9.1 FL (ref 9–12.9)
POTASSIUM SERPL-SCNC: 3.5 MMOL/L (ref 3.6–5.5)
PROT SERPL-MCNC: 6.4 G/DL (ref 6–8.2)
RBC # BLD AUTO: 4.32 M/UL (ref 4.2–5.4)
SIGNIFICANT IND 70042: NORMAL
SITE SITE: NORMAL
SODIUM SERPL-SCNC: 131 MMOL/L (ref 135–145)
SOURCE SOURCE: NORMAL
T4 FREE SERPL-MCNC: 2.12 NG/DL (ref 0.53–1.43)
TSH SERPL DL<=0.005 MIU/L-ACNC: 0.28 UIU/ML (ref 0.3–3.7)
VANCOMYCIN TROUGH SERPL-MCNC: 5.9 UG/ML (ref 10–20)
WBC # BLD AUTO: 14.4 K/UL (ref 4.8–10.8)

## 2017-01-04 PROCEDURE — 700111 HCHG RX REV CODE 636 W/ 250 OVERRIDE (IP)

## 2017-01-04 PROCEDURE — 700102 HCHG RX REV CODE 250 W/ 637 OVERRIDE(OP): Performed by: HOSPITALIST

## 2017-01-04 PROCEDURE — 99223 1ST HOSP IP/OBS HIGH 75: CPT | Mod: GC | Performed by: INTERNAL MEDICINE

## 2017-01-04 PROCEDURE — 700117 HCHG RX CONTRAST REV CODE 255: Performed by: INTERNAL MEDICINE

## 2017-01-04 PROCEDURE — 700111 HCHG RX REV CODE 636 W/ 250 OVERRIDE (IP): Performed by: HOSPITALIST

## 2017-01-04 PROCEDURE — 700112 HCHG RX REV CODE 229: Performed by: HOSPITALIST

## 2017-01-04 PROCEDURE — 85025 COMPLETE CBC W/AUTO DIFF WBC: CPT

## 2017-01-04 PROCEDURE — 700102 HCHG RX REV CODE 250 W/ 637 OVERRIDE(OP): Performed by: INTERNAL MEDICINE

## 2017-01-04 PROCEDURE — 87205 SMEAR GRAM STAIN: CPT

## 2017-01-04 PROCEDURE — A9270 NON-COVERED ITEM OR SERVICE: HCPCS | Performed by: HOSPITALIST

## 2017-01-04 PROCEDURE — 700105 HCHG RX REV CODE 258: Performed by: INTERNAL MEDICINE

## 2017-01-04 PROCEDURE — 87077 CULTURE AEROBIC IDENTIFY: CPT

## 2017-01-04 PROCEDURE — A9270 NON-COVERED ITEM OR SERVICE: HCPCS | Performed by: INTERNAL MEDICINE

## 2017-01-04 PROCEDURE — 700105 HCHG RX REV CODE 258: Performed by: HOSPITALIST

## 2017-01-04 PROCEDURE — 80307 DRUG TEST PRSMV CHEM ANLYZR: CPT

## 2017-01-04 PROCEDURE — 700105 HCHG RX REV CODE 258

## 2017-01-04 PROCEDURE — 87389 HIV-1 AG W/HIV-1&-2 AB AG IA: CPT

## 2017-01-04 PROCEDURE — 87070 CULTURE OTHR SPECIMN AEROBIC: CPT

## 2017-01-04 PROCEDURE — 700101 HCHG RX REV CODE 250: Performed by: OPHTHALMOLOGY

## 2017-01-04 PROCEDURE — 87186 SC STD MICRODIL/AGAR DIL: CPT

## 2017-01-04 PROCEDURE — 84439 ASSAY OF FREE THYROXINE: CPT

## 2017-01-04 PROCEDURE — 80053 COMPREHEN METABOLIC PANEL: CPT

## 2017-01-04 PROCEDURE — 770006 HCHG ROOM/CARE - MED/SURG/GYN SEMI*

## 2017-01-04 PROCEDURE — 36415 COLL VENOUS BLD VENIPUNCTURE: CPT

## 2017-01-04 PROCEDURE — A9579 GAD-BASE MR CONTRAST NOS,1ML: HCPCS | Performed by: INTERNAL MEDICINE

## 2017-01-04 PROCEDURE — 70553 MRI BRAIN STEM W/O & W/DYE: CPT

## 2017-01-04 PROCEDURE — 84443 ASSAY THYROID STIM HORMONE: CPT

## 2017-01-04 PROCEDURE — 80202 ASSAY OF VANCOMYCIN: CPT

## 2017-01-04 PROCEDURE — A9270 NON-COVERED ITEM OR SERVICE: HCPCS | Performed by: EMERGENCY MEDICINE

## 2017-01-04 PROCEDURE — 700102 HCHG RX REV CODE 250 W/ 637 OVERRIDE(OP): Performed by: EMERGENCY MEDICINE

## 2017-01-04 RX ORDER — MAGNESIUM SULFATE HEPTAHYDRATE 40 MG/ML
4 INJECTION, SOLUTION INTRAVENOUS ONCE
Status: COMPLETED | OUTPATIENT
Start: 2017-01-04 | End: 2017-01-04

## 2017-01-04 RX ORDER — POTASSIUM CHLORIDE 20 MEQ/1
40 TABLET, EXTENDED RELEASE ORAL 2 TIMES DAILY
Status: DISCONTINUED | OUTPATIENT
Start: 2017-01-04 | End: 2017-01-04

## 2017-01-04 RX ORDER — THIAMINE MONONITRATE (VIT B1) 100 MG
100 TABLET ORAL DAILY
Status: DISCONTINUED | OUTPATIENT
Start: 2017-01-04 | End: 2017-01-19 | Stop reason: HOSPADM

## 2017-01-04 RX ORDER — SODIUM CHLORIDE 9 MG/ML
1000 INJECTION, SOLUTION INTRAVENOUS CONTINUOUS
Status: DISCONTINUED | OUTPATIENT
Start: 2017-01-04 | End: 2017-01-06

## 2017-01-04 RX ORDER — POTASSIUM CHLORIDE 20 MEQ/1
40 TABLET, EXTENDED RELEASE ORAL ONCE
Status: COMPLETED | OUTPATIENT
Start: 2017-01-04 | End: 2017-01-04

## 2017-01-04 RX ORDER — POTASSIUM CHLORIDE 20 MEQ/1
20 TABLET, EXTENDED RELEASE ORAL ONCE
Status: COMPLETED | OUTPATIENT
Start: 2017-01-04 | End: 2017-01-04

## 2017-01-04 RX ORDER — TOBRAMYCIN AND DEXAMETHASONE 3; 1 MG/ML; MG/ML
1 SUSPENSION/ DROPS OPHTHALMIC
Status: DISCONTINUED | OUTPATIENT
Start: 2017-01-04 | End: 2017-01-19 | Stop reason: HOSPADM

## 2017-01-04 RX ADMIN — SODIUM CHLORIDE 2000 ML: 9 INJECTION, SOLUTION INTRAVENOUS at 05:59

## 2017-01-04 RX ADMIN — POTASSIUM CHLORIDE 40 MEQ: 1500 TABLET, EXTENDED RELEASE ORAL at 06:02

## 2017-01-04 RX ADMIN — DOCUSATE SODIUM 100 MG: 100 CAPSULE ORAL at 08:43

## 2017-01-04 RX ADMIN — ENOXAPARIN SODIUM 40 MG: 100 INJECTION SUBCUTANEOUS at 08:43

## 2017-01-04 RX ADMIN — TOBRAMYCIN AND DEXAMETHASONE 1 DROP: 3; 1 SUSPENSION/ DROPS OPHTHALMIC at 11:16

## 2017-01-04 RX ADMIN — GADODIAMIDE 20 ML: 287 INJECTION INTRAVENOUS at 15:51

## 2017-01-04 RX ADMIN — VANCOMYCIN HYDROCHLORIDE 1000 MG: 100 INJECTION, POWDER, LYOPHILIZED, FOR SOLUTION INTRAVENOUS at 08:42

## 2017-01-04 RX ADMIN — TOBRAMYCIN AND DEXAMETHASONE 1 DROP: 3; 1 SUSPENSION/ DROPS OPHTHALMIC at 16:00

## 2017-01-04 RX ADMIN — Medication 100 MG: at 11:13

## 2017-01-04 RX ADMIN — TOBRAMYCIN AND DEXAMETHASONE 1 DROP: 3; 1 SUSPENSION/ DROPS OPHTHALMIC at 05:59

## 2017-01-04 RX ADMIN — VANCOMYCIN HYDROCHLORIDE 1000 MG: 100 INJECTION, POWDER, LYOPHILIZED, FOR SOLUTION INTRAVENOUS at 00:08

## 2017-01-04 RX ADMIN — VANCOMYCIN HYDROCHLORIDE 1000 MG: 100 INJECTION, POWDER, LYOPHILIZED, FOR SOLUTION INTRAVENOUS at 16:30

## 2017-01-04 RX ADMIN — SODIUM CHLORIDE 1000 ML: 9 INJECTION, SOLUTION INTRAVENOUS at 20:56

## 2017-01-04 RX ADMIN — TOBRAMYCIN AND DEXAMETHASONE 1 DROP: 3; 1 SUSPENSION/ DROPS OPHTHALMIC at 14:20

## 2017-01-04 RX ADMIN — SODIUM CHLORIDE 1000 ML: 9 INJECTION, SOLUTION INTRAVENOUS at 14:45

## 2017-01-04 RX ADMIN — TOBRAMYCIN AND DEXAMETHASONE 1 DROP: 3; 1 SUSPENSION/ DROPS OPHTHALMIC at 12:40

## 2017-01-04 RX ADMIN — TOBRAMYCIN AND DEXAMETHASONE 1 DROP: 3; 1 SUSPENSION/ DROPS OPHTHALMIC at 18:00

## 2017-01-04 RX ADMIN — POTASSIUM CHLORIDE 20 MEQ: 1500 TABLET, EXTENDED RELEASE ORAL at 11:13

## 2017-01-04 RX ADMIN — ACETAMINOPHEN 650 MG: 325 TABLET, FILM COATED ORAL at 16:38

## 2017-01-04 RX ADMIN — HYDROCODONE BITARTRATE AND ACETAMINOPHEN 2 TABLET: 5; 325 TABLET ORAL at 06:09

## 2017-01-04 RX ADMIN — TOBRAMYCIN AND DEXAMETHASONE 1 DROP: 3; 1 SUSPENSION/ DROPS OPHTHALMIC at 08:42

## 2017-01-04 RX ADMIN — CEFTRIAXONE 2 G: 2 INJECTION, POWDER, FOR SOLUTION INTRAMUSCULAR; INTRAVENOUS at 11:12

## 2017-01-04 RX ADMIN — TOBRAMYCIN AND DEXAMETHASONE 1 DROP: 3; 1 SUSPENSION/ DROPS OPHTHALMIC at 20:45

## 2017-01-04 ASSESSMENT — ENCOUNTER SYMPTOMS
GASTROINTESTINAL NEGATIVE: 1
MUSCULOSKELETAL NEGATIVE: 1
HEADACHES: 0
NEUROLOGICAL NEGATIVE: 1
EYE DISCHARGE: 0
WEIGHT LOSS: 0
RESPIRATORY NEGATIVE: 1
CARDIOVASCULAR NEGATIVE: 1
EYE REDNESS: 1
PSYCHIATRIC NEGATIVE: 1
SORE THROAT: 0
BLURRED VISION: 0
PHOTOPHOBIA: 0
DOUBLE VISION: 0
EYE PAIN: 1
FEVER: 1
CHILLS: 0

## 2017-01-04 ASSESSMENT — PAIN SCALES - GENERAL
PAINLEVEL_OUTOF10: 0
PAINLEVEL_OUTOF10: 8
PAINLEVEL_OUTOF10: 0

## 2017-01-04 NOTE — PROGRESS NOTES
"Pharmacy Kinetics 31 y.o. female on vancomycin day # 2 2017    Currently on Vancomycin 1000 mg iv q8hr (0000 ,0800, 1600)    Indication for Treatment: facial cellulitis    Pertinent history per medical record: Admitted on 1/3/2017 for facial cellulitis. Patient with left forehead swelling above eye. She thought it was a pimple, but today is much larger and painful. CT orbits with possible cellulitis.    Other antibiotics: rocephin 2 g IV q24h    Allergies: Amoxicillin and Penicillins     List concerns for renal function: 2/4 SIRS criteria    Pertinent cultures to date:   none    Recent Labs      17   1055  17   0208   WBC  17.6*  14.4*   NEUTSPOLYS  88.60*  92.40*     Recent Labs      17   1055  17   0208   BUN  5*  5*   CREATININE  0.54  0.55   ALBUMIN  4.3  3.6     Intake/Output Summary (Last 24 hours) at 17 1326  Last data filed at 17 1100   Gross per 24 hour   Intake   1980 ml   Output      0 ml   Net   1980 ml      Blood pressure 110/70, pulse 94, temperature 38.3 °C (101 °F), temperature source Temporal, resp. rate 16, height 1.575 m (5' 2.01\"), weight 68.9 kg (151 lb 14.4 oz), last menstrual period 2011, SpO2 98 %. Temp (24hrs), Av.2 °C (99 °F), Min:36.6 °C (97.8 °F), Max:38.3 °C (101 °F)      A/P   1. Vancomycin dose change: not indicated  2. Next vancomycin level:  @ 1600  3. Goal trough: 12-16 mcg/ml  4. Previous vancomycin treatment: none  5. MRSA nares swab: n/a  6. Comments: no new concerns for renal function.  Will check a level today prior to 1630 dose (prior to 4th total dose).  Will f/u on level later this afternoon.  Pharmacy will continue to monitor and adjust dosing if needed.      Sonia Gonzalez, PharmD        "

## 2017-01-04 NOTE — CARE PLAN
Problem: Infection  Goal: Will remain free from infection  Outcome: PROGRESSING AS EXPECTED  No s/s of new or worsening infection. Abx as ordered.

## 2017-01-04 NOTE — CONSULTS
32 yo female  CC: PAINFUL FOREHEAD  HPI: X 24 HOURS LEFT SIDE OF FOREHEAD BY HAIRLINE, REDNESS AND SWELLING RAPIDLY PROGRESSING INTO LID ON LEFT SIDE HARD TO OPEN EYE X MANY HOURS. CONSULTED FOR OCULAR INVOLVEMENT    PUPILS 6-3 OU NO APD  TA 16/23  EOM FULL OU NO DIPLOPIA WHEN LEFT LID LIFTED FOR BINOCULAR VISION  PLE: 3+EDEMA UPPER LID LEFT, ONLY MINIMAL ERYTHEMA LID CONTIGUOUS WITH EDEMA FROM SCALP LINE LESION  CONJ MILD ERYTHEMA OS NONE OD  K CLEAR OU  AC DEEP AND QUIET OU  LENS CLEAR AND CENTERED OU  I WNL OU  POSTERIOR SEGMENT  CD=0.3OU NL DPVM    I/P 1) FACIAL CELLULITIS WITH EDEMA EXTENDING INTO PRESEPTAL AREA LEFT NO OCULAR INVOLVEMENT CURRENTLY PT UNABLE TO LIFT LID SPONTANEOUSLY, WHEN LIFTED REPORTS OTHER THAN MILD BLURRYNESS, NORMAL VISION, NO EYE PAIN OR DISCHARGE CURRENTLY  -TOBRADEX BID OS  -NPO FOR NOW  -WILL RE EVAL IN AM    ANNITA MASSEY MD  OPHTHALMOLOGY

## 2017-01-04 NOTE — PROGRESS NOTES
Pt seen by Dr. García this am, tobramycin eyedrops increased to q2hr while awake. Dr. García verbalized he will be back to see patient later this evening.

## 2017-01-04 NOTE — CARE PLAN
Problem: Pain Management  Goal: Pain level will decrease to patient’s comfort goal  Outcome: PROGRESSING AS EXPECTED  Pain controlled with PRN pain meds per MAR. Nonpharm pain control methods encouraged. Ice pack provided.

## 2017-01-04 NOTE — PROGRESS NOTES
Received report and assumed care of the patient. Patient is alert and oriented x4. Lethargic. Able to wake up but will fall asleep mid sentence. Bed alarm on. Bed in low and locked position. Call light and belongings within reach. Treaded socks on. Plan of care discussed. Hourly rounding in place.

## 2017-01-04 NOTE — PROGRESS NOTES
Patient received to med/neph. VSS. Patient is lethargic, easily aroused but quickly falls to sleep during conversation. Denies pain. Oriented to room. Bed alarm on.

## 2017-01-04 NOTE — CARE PLAN
Problem: Safety  Goal: Will remain free from injury  Outcome: PROGRESSING AS EXPECTED  Bed alarm on. Bed in low and locked position. Call light and belongings within reach. Hourly rounding in place.     Problem: Knowledge Deficit  Goal: Knowledge of disease process/condition, treatment plan, diagnostic tests, and medications will improve  Outcome: PROGRESSING AS EXPECTED  Plan of care discussed with the patient.

## 2017-01-04 NOTE — CARE PLAN
Problem: Infection  Goal: Will remain free from infection  Outcome: PROGRESSING AS EXPECTED  WBC elevated, MEPAMELLA 3, MD aware. Will monitor closely for worsening infection. IV antibiotic therpy in place.    Problem: Knowledge Deficit  Goal: Knowledge of disease process/condition, treatment plan, diagnostic tests, and medications will improve  Outcome: PROGRESSING AS EXPECTED  POC discussed, questions answered.

## 2017-01-05 PROBLEM — B95.8 BACTEREMIA DUE TO STAPHYLOCOCCUS: Status: ACTIVE | Noted: 2017-01-05

## 2017-01-05 PROBLEM — R78.81 BACTEREMIA DUE TO STAPHYLOCOCCUS: Status: ACTIVE | Noted: 2017-01-05

## 2017-01-05 PROBLEM — R74.8 ELEVATED LIVER ENZYMES: Status: ACTIVE | Noted: 2017-01-05

## 2017-01-05 LAB
ALBUMIN SERPL BCP-MCNC: 3.2 G/DL (ref 3.2–4.9)
ALBUMIN/GLOB SERPL: 1.1 G/DL
ALP SERPL-CCNC: 83 U/L (ref 30–99)
ALT SERPL-CCNC: 68 U/L (ref 2–50)
ANION GAP SERPL CALC-SCNC: 9 MMOL/L (ref 0–11.9)
AST SERPL-CCNC: 91 U/L (ref 12–45)
BASOPHILS # BLD AUTO: 0 % (ref 0–1.8)
BASOPHILS # BLD: 0 K/UL (ref 0–0.12)
BILIRUB SERPL-MCNC: 0.4 MG/DL (ref 0.1–1.5)
BUN SERPL-MCNC: 6 MG/DL (ref 8–22)
CALCIUM SERPL-MCNC: 8.4 MG/DL (ref 8.5–10.5)
CHLORIDE SERPL-SCNC: 102 MMOL/L (ref 96–112)
CO2 SERPL-SCNC: 22 MMOL/L (ref 20–33)
CREAT SERPL-MCNC: 0.45 MG/DL (ref 0.5–1.4)
EOSINOPHIL # BLD AUTO: 0.05 K/UL (ref 0–0.51)
EOSINOPHIL NFR BLD: 0.9 % (ref 0–6.9)
ERYTHROCYTE [DISTWIDTH] IN BLOOD BY AUTOMATED COUNT: 40.2 FL (ref 35.9–50)
GFR SERPL CREATININE-BSD FRML MDRD: >60 ML/MIN/1.73 M 2
GLOBULIN SER CALC-MCNC: 2.9 G/DL (ref 1.9–3.5)
GLUCOSE SERPL-MCNC: 118 MG/DL (ref 65–99)
HCT VFR BLD AUTO: 36.2 % (ref 37–47)
HGB BLD-MCNC: 11.6 G/DL (ref 12–16)
LYMPHOCYTES # BLD AUTO: 0.55 K/UL (ref 1–4.8)
LYMPHOCYTES NFR BLD: 9.6 % (ref 22–41)
MAGNESIUM SERPL-MCNC: 1.6 MG/DL (ref 1.5–2.5)
MANUAL DIFF BLD: ABNORMAL
MCH RBC QN AUTO: 27.6 PG (ref 27–33)
MCHC RBC AUTO-ENTMCNC: 32 G/DL (ref 33.6–35)
MCV RBC AUTO: 86.2 FL (ref 81.4–97.8)
METAMYELOCYTES NFR BLD MANUAL: 0.9 %
MONOCYTES # BLD AUTO: 0.15 K/UL (ref 0–0.85)
MONOCYTES NFR BLD AUTO: 2.6 % (ref 0–13.4)
MORPHOLOGY BLD-IMP: NORMAL
NEUTROPHILS # BLD AUTO: 4.9 K/UL (ref 2–7.15)
NEUTROPHILS NFR BLD: 74.6 % (ref 44–72)
NEUTS BAND NFR BLD MANUAL: 11.4 % (ref 0–10)
NRBC # BLD AUTO: 0 K/UL
NRBC BLD AUTO-RTO: 0 /100 WBC
PLATELET # BLD AUTO: 188 K/UL (ref 164–446)
PLATELET BLD QL SMEAR: NORMAL
PMV BLD AUTO: 9.3 FL (ref 9–12.9)
POTASSIUM SERPL-SCNC: 3.6 MMOL/L (ref 3.6–5.5)
PROT SERPL-MCNC: 6.1 G/DL (ref 6–8.2)
RBC # BLD AUTO: 4.2 M/UL (ref 4.2–5.4)
RBC BLD AUTO: NORMAL
SODIUM SERPL-SCNC: 133 MMOL/L (ref 135–145)
WBC # BLD AUTO: 5.7 K/UL (ref 4.8–10.8)

## 2017-01-05 PROCEDURE — 85027 COMPLETE CBC AUTOMATED: CPT

## 2017-01-05 PROCEDURE — 700111 HCHG RX REV CODE 636 W/ 250 OVERRIDE (IP): Performed by: INTERNAL MEDICINE

## 2017-01-05 PROCEDURE — 85007 BL SMEAR W/DIFF WBC COUNT: CPT

## 2017-01-05 PROCEDURE — 700105 HCHG RX REV CODE 258: Performed by: HOSPITALIST

## 2017-01-05 PROCEDURE — A9270 NON-COVERED ITEM OR SERVICE: HCPCS | Performed by: INTERNAL MEDICINE

## 2017-01-05 PROCEDURE — 700102 HCHG RX REV CODE 250 W/ 637 OVERRIDE(OP): Performed by: INTERNAL MEDICINE

## 2017-01-05 PROCEDURE — 700111 HCHG RX REV CODE 636 W/ 250 OVERRIDE (IP)

## 2017-01-05 PROCEDURE — 36415 COLL VENOUS BLD VENIPUNCTURE: CPT

## 2017-01-05 PROCEDURE — 700112 HCHG RX REV CODE 229: Performed by: HOSPITALIST

## 2017-01-05 PROCEDURE — 700102 HCHG RX REV CODE 250 W/ 637 OVERRIDE(OP): Performed by: HOSPITALIST

## 2017-01-05 PROCEDURE — 83735 ASSAY OF MAGNESIUM: CPT

## 2017-01-05 PROCEDURE — 770006 HCHG ROOM/CARE - MED/SURG/GYN SEMI*

## 2017-01-05 PROCEDURE — 80053 COMPREHEN METABOLIC PANEL: CPT

## 2017-01-05 PROCEDURE — 700111 HCHG RX REV CODE 636 W/ 250 OVERRIDE (IP): Performed by: HOSPITALIST

## 2017-01-05 PROCEDURE — 99232 SBSQ HOSP IP/OBS MODERATE 35: CPT | Mod: GC | Performed by: INTERNAL MEDICINE

## 2017-01-05 PROCEDURE — A9270 NON-COVERED ITEM OR SERVICE: HCPCS | Performed by: EMERGENCY MEDICINE

## 2017-01-05 PROCEDURE — 700102 HCHG RX REV CODE 250 W/ 637 OVERRIDE(OP): Performed by: EMERGENCY MEDICINE

## 2017-01-05 PROCEDURE — A9270 NON-COVERED ITEM OR SERVICE: HCPCS | Performed by: HOSPITALIST

## 2017-01-05 PROCEDURE — 700105 HCHG RX REV CODE 258: Performed by: INTERNAL MEDICINE

## 2017-01-05 PROCEDURE — 700105 HCHG RX REV CODE 258

## 2017-01-05 RX ORDER — MAGNESIUM SULFATE HEPTAHYDRATE 40 MG/ML
2 INJECTION, SOLUTION INTRAVENOUS ONCE
Status: COMPLETED | OUTPATIENT
Start: 2017-01-05 | End: 2017-01-05

## 2017-01-05 RX ADMIN — TOBRAMYCIN AND DEXAMETHASONE 1 DROP: 3; 1 SUSPENSION/ DROPS OPHTHALMIC at 10:00

## 2017-01-05 RX ADMIN — ACETAMINOPHEN 650 MG: 325 TABLET, FILM COATED ORAL at 04:35

## 2017-01-05 RX ADMIN — TOBRAMYCIN AND DEXAMETHASONE 1 DROP: 3; 1 SUSPENSION/ DROPS OPHTHALMIC at 18:00

## 2017-01-05 RX ADMIN — VANCOMYCIN HYDROCHLORIDE 1200 MG: 100 INJECTION, POWDER, LYOPHILIZED, FOR SOLUTION INTRAVENOUS at 00:02

## 2017-01-05 RX ADMIN — SODIUM CHLORIDE 1000 ML: 9 INJECTION, SOLUTION INTRAVENOUS at 02:49

## 2017-01-05 RX ADMIN — Medication 100 MG: at 09:36

## 2017-01-05 RX ADMIN — VANCOMYCIN HYDROCHLORIDE 1200 MG: 100 INJECTION, POWDER, LYOPHILIZED, FOR SOLUTION INTRAVENOUS at 23:33

## 2017-01-05 RX ADMIN — MAGNESIUM SULFATE IN WATER 2 G: 40 INJECTION, SOLUTION INTRAVENOUS at 07:30

## 2017-01-05 RX ADMIN — CEFTRIAXONE 2 G: 2 INJECTION, POWDER, FOR SOLUTION INTRAMUSCULAR; INTRAVENOUS at 09:35

## 2017-01-05 RX ADMIN — ENOXAPARIN SODIUM 40 MG: 100 INJECTION SUBCUTANEOUS at 09:37

## 2017-01-05 RX ADMIN — VANCOMYCIN HYDROCHLORIDE 1200 MG: 100 INJECTION, POWDER, LYOPHILIZED, FOR SOLUTION INTRAVENOUS at 15:38

## 2017-01-05 RX ADMIN — TOBRAMYCIN AND DEXAMETHASONE 1 DROP: 3; 1 SUSPENSION/ DROPS OPHTHALMIC at 15:38

## 2017-01-05 RX ADMIN — HYDROCODONE BITARTRATE AND ACETAMINOPHEN 2 TABLET: 5; 325 TABLET ORAL at 17:00

## 2017-01-05 RX ADMIN — DOCUSATE SODIUM 100 MG: 100 CAPSULE ORAL at 09:36

## 2017-01-05 RX ADMIN — Medication 1 TABLET: at 21:54

## 2017-01-05 RX ADMIN — TOBRAMYCIN AND DEXAMETHASONE 1 DROP: 3; 1 SUSPENSION/ DROPS OPHTHALMIC at 05:47

## 2017-01-05 RX ADMIN — VANCOMYCIN HYDROCHLORIDE 1200 MG: 100 INJECTION, POWDER, LYOPHILIZED, FOR SOLUTION INTRAVENOUS at 09:34

## 2017-01-05 RX ADMIN — TOBRAMYCIN AND DEXAMETHASONE 1 DROP: 3; 1 SUSPENSION/ DROPS OPHTHALMIC at 21:55

## 2017-01-05 ASSESSMENT — ENCOUNTER SYMPTOMS
PALPITATIONS: 0
SHORTNESS OF BREATH: 0
DOUBLE VISION: 0
EYE REDNESS: 1
FEVER: 0
EYE PAIN: 1
WEIGHT LOSS: 0
MUSCULOSKELETAL NEGATIVE: 1
GASTROINTESTINAL NEGATIVE: 1
ABDOMINAL PAIN: 0
PHOTOPHOBIA: 0
COUGH: 0
VOMITING: 0
EYE DISCHARGE: 0
NEUROLOGICAL NEGATIVE: 1
SORE THROAT: 0
CHILLS: 0
ORTHOPNEA: 0
RESPIRATORY NEGATIVE: 1
NAUSEA: 0
BLURRED VISION: 0
HEADACHES: 0
PSYCHIATRIC NEGATIVE: 1
FEVER: 1
CARDIOVASCULAR NEGATIVE: 1
CONSTIPATION: 0
DIARRHEA: 0

## 2017-01-05 ASSESSMENT — PAIN SCALES - GENERAL
PAINLEVEL_OUTOF10: 2
PAINLEVEL_OUTOF10: 3

## 2017-01-05 NOTE — ASSESSMENT & PLAN NOTE
- TSH: 0.280 low ----> 2.3 wnl on 1/9  - Free T-4: 2.12 high  - no physical symptoms concerning hyperthyroidism; except HR >100 st, however, patient has fever with infection   - repeat TSH wnl; likely due to acute infectious process; will monitor.

## 2017-01-05 NOTE — PROGRESS NOTES
Seen at request of Dr. Apple for bacteremia and facial cellulitis.  Full note dictated.   Asses:Facial cellulitis with bacteremia; PCN allergic but tolerating ceftriaxone without problem   REC:  Stop ceftriaxone now  Continue vancomycin, if MSSA  Switch to iv cefazolin, :  If MRSA continue vancomycin  TTE  Repeat blood cultures tomorrow to see if bacteremia resolved

## 2017-01-05 NOTE — PROGRESS NOTES
Sub: Pt very sleepy but responsive left eye feels much better vision is equal to right eye  O: EE diminished edema EOM still mildly limited adduction abduction os only ? Infraduction  VA j1       j2  Pupil 3-2 od           4-3 os no APD  PLE:(Left)  L/L diminishing Edema compared with last night, patient can almost open os spontaneously  Conj Scattered Sub Conj Heme Chemosis minimal  K Clear  A/C Deep  Lens Clear  I wnl    I/P 1) Orbital Cellulitis noticeably improved this am   -Continue Meds today Discharge per Primary   -Arranged Fu for Tomorrow am if discharged otherwise Monday

## 2017-01-05 NOTE — PROGRESS NOTES
"Pharmacy Kinetics 31 y.o. female on vancomycin day # 3 2017    Currently on Vancomycin 1200 mg iv q8hr (0730, 1530, 2330)    Indication for Treatment: facial cellulitis    Pertinent history per medical record: Admitted on 1/3/2017 for facial cellulitis. Patient with left forehead swelling above eye. She thought it was a pimple, but today is much larger and painful. CT orbits with possible cellulitis.    Other antibiotics: rocephin 2 g IV q24h    Allergies: Amoxicillin and Penicillins     List concerns for renal function: transaminitis     Pertinent cultures to date:   17: wound (head) POS - staphylococcus aureas, heavy growth    Recent Labs      17   1055  17   0208  17   0255   WBC  17.6*  14.4*  5.7   NEUTSPOLYS  88.60*  92.40*  74.60*   BANDSSTABS   --    --   11.40*     Recent Labs      17   1055  17   0208  17   0255   BUN  5*  5*  6*   CREATININE  0.54  0.55  0.45*   ALBUMIN  4.3  3.6  3.2     Recent Labs      17   1607   VANCOTROUGH  5.9*     Intake/Output Summary (Last 24 hours) at 17 1344  Last data filed at 17 0500   Gross per 24 hour   Intake   2990 ml   Output      0 ml   Net   2990 ml      Blood pressure 116/82, pulse 78, temperature 36.6 °C (97.8 °F), temperature source Temporal, resp. rate 19, height 1.575 m (5' 2.01\"), weight 68.9 kg (151 lb 14.4 oz), last menstrual period 2011, SpO2 96 %. Temp (24hrs), Av.6 °C (99.6 °F), Min:36.6 °C (97.8 °F), Max:38.8 °C (101.8 °F)      A/P   1. Vancomycin dose change: not indicated  2. Next vancomycin level: 1/6 @ 0700  3. Goal trough: 12-16 mcg/ml  4. Comments: dosing was increased yesterday due to subtherapeutic level.  In addition, I scheduled the new dose to be started early in an attempt to boost the level.  However, it appears that some doses was delayed overnight so level may still be low at this point.  Will recheck a level tomorrow morning (prior to 5th administration of new dose).  " Pharmacy will continue to monitor and adjust dosing if needed.      Sonia Gonzalez, PharmD

## 2017-01-05 NOTE — PROGRESS NOTES
[facility] [place]    Name Rola Carrillo 1985   Age/Sex 31 y.o. female   MRN [id number]   Code Status Full     After 5PM or if no immediate response to page, please call for cross-coverage  Attending/Team: Douglas/Dr. Apple Call [phone] to page   1st Call - Day Intern (R1):   Dr. Ruiz 2nd Call - Day Sr. Resident (R2/R3):   Dr. Little         Chief complaint/ reason for interval visit (Primary Diagnosis)   Left pre and post septal orbital cellulitis and left forehead    Interval Problem Daily Status Update    - continue to have fever spike 101.4 & intermittent tachy HR 80 - 108  - stable vitals otherwise   - BCx (+) Staph aureus ?  - continue vanco & DC ceftriaxone   - closely monitor the left eye      Active Hospital Problems    Diagnosis   • Hyperthyroidism [E05.90]   • Cellulitis of left orbit [H05.012]       Review of Systems   Constitutional: Positive for fever. Negative for chills and weight loss.   HENT: Negative for congestion, nosebleeds and sore throat.    Eyes: Positive for pain and redness. Negative for blurred vision, double vision, photophobia and discharge.        Left eyelid unable to open  Right eye wnl   Respiratory: Negative.    Cardiovascular: Negative.    Gastrointestinal: Negative.    Genitourinary: Negative.    Musculoskeletal: Negative.    Skin:        Erythema of left upper face and periorbital region   Neurological: Negative.  Negative for headaches.   Endo/Heme/Allergies: Negative.    Psychiatric/Behavioral: Negative.        Consultants/Specialty  Ophthalmology    Disposition  Inpatient being treated for orbital cellulitis    Quality Measures  Radiology images reviewed, Labs reviewed and Medications reviewed  Castellanos catheter: No Castellanos      DVT Prophylaxis: Enoxaparin (Lovenox)                  Physical Exam       Filed Vitals:    17 0400 17 0546 17 0800 17 1204   BP: 126/80  116/82    Pulse: 87  78     Temp: 38.6 °C (101.4 °F) 37.3 °C (99.2 °F) 36.6 °C (97.8 °F)    TempSrc:       Resp: 16  24 19   Height:       Weight:       SpO2: 99%  96%      Body mass index is 27.78 kg/(m^2).    Oxygen Therapy:  Pulse Oximetry: 96 %, O2 (LPM): 0, O2 Delivery: None (Room Air)    Physical Exam   Constitutional: She is oriented to person, place, and time and well-developed, well-nourished, and in no distress. No distress.   HENT:   Head: Normocephalic and atraumatic.   Eyes: Right eye exhibits no discharge. Left eye exhibits chemosis. Left eye exhibits no discharge. Right conjunctiva is not injected. Right conjunctiva has no hemorrhage. Left conjunctiva is injected. Left conjunctiva has no hemorrhage. No scleral icterus. Right eye exhibits normal extraocular motion and no nystagmus. Left eye exhibits abnormal extraocular motion. Left eye exhibits no nystagmus. Right pupil is round and reactive. Left pupil is round and reactive.       Restricted on left but normal on right   Neck: Normal range of motion. Neck supple. No thyromegaly present.   Cardiovascular: Regular rhythm and normal heart sounds.    No murmur heard.  Pulmonary/Chest: Effort normal and breath sounds normal.   Abdominal: Soft. Bowel sounds are normal.   Neurological: She is alert and oriented to person, place, and time.   somnolent   Skin: She is not diaphoretic.   Psychiatric: Affect normal.   Vitals reviewed.        Lab Data Review:      1/4/2017  5:49 PM    Recent Labs      01/03/17   1055  01/04/17   0208  01/05/17   0255   SODIUM  134*  131*  133*   POTASSIUM  4.0  3.5*  3.6   CHLORIDE  101  101  102   CO2  22  21  22   BUN  5*  5*  6*   CREATININE  0.54  0.55  0.45*   MAGNESIUM  1.8   --   1.6   CALCIUM  9.5  8.6  8.4*       Recent Labs      01/03/17   1055  01/04/17   0208  01/05/17   0255   ALTSGPT  8  9  68*   ASTSGOT  13  9*  91*   ALKPHOSPHAT  80  65  83   TBILIRUBIN  0.5  0.6  0.4   GLUCOSE  124*  109*  118*       Recent Labs      01/03/17   1055   01/04/17   0208  01/05/17   0255   RBC  5.05  4.32  4.20   HEMOGLOBIN  14.1  12.2  11.6*   HEMATOCRIT  45.8  37.3  36.2*   PLATELETCT  266  234  188       Recent Labs      01/03/17   1055  01/04/17   0208  01/05/17   0255   WBC  17.6*  14.4*  5.7   NEUTSPOLYS  88.60*  92.40*  74.60*   LYMPHOCYTES  4.70*  3.30*  9.60*   MONOCYTES  6.00  3.30  2.60   EOSINOPHILS  0.10  0.10  0.90   BASOPHILS  0.30  0.20  0.00   ASTSGOT  13  9*  91*   ALTSGPT  8  9  68*   ALKPHOSPHAT  80  65  83   TBILIRUBIN  0.5  0.6  0.4           Assessment/Plan     Cellulitis of left orbit  Assessment & Plan  Assessment:  -Left upper face swelling/erythema involving upper eyelid and eye  - On admission: acute onset x 2 days after pimple appeared on the left side of forehead; progressively involved the left eyelid with swelling and unable to open the eye    - on admission: grossly intact extra-ocular muscles movement; with normal vision 20/23 with hand-held snellen chart    - on admission: WBC count: 17.6, VS: /76, pulse 83, temp 36.5 °C (97.7 °F), SpO2 100 %. RA   - tonometry in ED: 12 RE, 24 LE  - CT orbits: orbital cellulitis  - 1/4/17: worsening swelling of eyelid; EOM restricted (partly due to worsening chemosis), Vision intact 20/30, light reflex normal; ophthalmology on board; erythema has reduced and regressed from the initial marking; also purulent substance from the pustule, sent for culture.   -  1/3 & 1/4: BCx (+) for staph aureus     - since admission 1/3: C3 and Vancomycin;   - Ophthalmology following closely: started patient on tobramycin-dexamethasone drops q2hrs  - MR head w/wo: Impression: mild orbital and periorbital cellulitis, mild proptosis, no abscess, no evidence of cavernous sinus thrombosis.   > PLAN:   - 1/5: discontinue ceftriaxone & continue IV vancomycin per ID Dr Hilliard (appreciate recommendation)  - pending EKG, ECHO & repeat BCx  - regular eye exam; update ophthalmology with any changes.   - tylenol prn for  pain and fever.  - ctm         Hyperthyroidism  Assessment & Plan  - TSH: 0.280  - Free T-4: 2.12  - no physical symptoms concerning hyperthyroidism; except HR >100, however, patient has fever with infection   - will need further workup once acute complaint is more stable.   - consider TSI/ US

## 2017-01-05 NOTE — PROGRESS NOTES
Pharmacy Kinetics Addendum:    31 y.o. female on vancomycin day # 2 1/4/2017    Currently on Vancomycin 1000 mg iv q8hr (0030, 0830, 1630)    Indication for Treatment: facial cellulitis    Recent Labs      01/03/17   1055  01/04/17   0208   BUN  5*  5*   CREATININE  0.54  0.55   ALBUMIN  4.3  3.6     Recent Labs      01/04/17   1607   VANCOTROUGH  5.9*       A/P   1. Vancomycin dose change: 1200 mg IV q8h (0730, 1530, 2330)  2. Next vancomycin level: 1/6 @ 0700 (not ordered)  3. Goal trough: 12-16 mcg/ml  4. Comments: level surprisingly subtherapeutic at only 5.9 mcg/ml.  Following linear kinetics, pt will require a dose of 30 mg/kg q8h to achieve goal trough.  I feel this is very unreasonable to dose so high.  Instead, will increase dosing to 1200 mg (18 mg/kg) q8h and will start next dose in 6 hrs.  Will recheck a level prior to 4th administration of new dose.  Pharmacy will continue to monitor and further adjust dosing if needed and clinically warranted.       Sonia Gonzalez, PharmD

## 2017-01-05 NOTE — ASSESSMENT & PLAN NOTE
-Left upper face swelling/erythema involving upper eyelid and eye  - On admission: acute onset x 2 days after pimple appeared on the left side of forehead; progressively involved the left eyelid with swelling and unable to open the eye    - on admission: grossly intact extra-ocular muscles movement; with normal vision 20/23 with hand-held snellen chart    - on admission: WBC count: 17.6, VS: /76, pulse 83, temp 36.5 °C (97.7 °F), SpO2 100 %. RA   - tonometry in ED: 12 RE, 24 LE  - CT orbits: orbital cellulitis  - 1/4/17: worsening swelling of eyelid; EOM restricted (partly due to worsening chemosis), Vision intact 20/30, light reflex normal; ophthalmology on board; erythema has reduced and regressed from the initial marking; also purulent substance from the pustule, sent for culture.    - Ophthalmology following closely: started patient on tobramycin-dexamethasone drops q2hrs  - MR head w/wo: Impression: mild orbital and periorbital cellulitis, mild proptosis, no abscess, no evidence of cavernous sinus thrombosis.   - continued on vancomycin and d/c ceftriaxone given staph aureus; MRSA + in both wound and blood  - 1/8: significant improvement in the left eye, able to open completely, swelling resolved; chemosis improved.   PLAN:   - continue Vancomycin per ID recs  - ECHO: wnl, EF: 65%, no valvular vegetation  -  repeat BCx 1/5: no growth  - 14 days IV vancomycin from 1/5 till 1/19  - eye exam improved  - tylenol prn for pain and fever.  - PICC line placed 1/10;   - 1/11: Dr. Hilliard; per his recommendation; no change in antibiotic. Continue vancomycin and complete the course.   - BCx (-) 1/16 -> ABx 2 weeks -> end date 1/19   - ctm

## 2017-01-05 NOTE — PROGRESS NOTES
[facility] [place] [facility] [place]    Name Rola Carrillo 1985   Age/Sex 31 y.o. female   MRN [id number]   Code Status FUll     After 5PM or if no immediate response to page, please call for cross-coverage  Attending/Team: Blue/Adan Call [phone] to page   1st Call - Day Intern (R1):   n/a 2nd Call - Day Sr. Resident (R2/R3):   Rubi Little MD         Chief complaint/ reason for interval visit (Primary Diagnosis)   Left eye pain and erythema + swelling over the left forehead and eye    Interval Problem Daily Status Update    Active Hospital Problems    Diagnosis   • Elevated liver enzymes [R74.8]   • Hyperthyroidism [E05.90]   • Cellulitis of left orbit [H05.012]       Review of Systems   Constitutional: Negative for fever, chills and malaise/fatigue.   Eyes: Positive for pain. Negative for blurred vision, double vision and discharge.   Respiratory: Negative for cough and shortness of breath.    Cardiovascular: Negative for chest pain, palpitations and orthopnea.   Gastrointestinal: Negative for nausea, vomiting, abdominal pain, diarrhea and constipation.   Genitourinary: Negative for dysuria, urgency and frequency.          Consultants/Specialty  Opthamology  Consider Infectious Disease    Disposition  Hospital     Quality Measures  Labs reviewed                          Physical Exam       Filed Vitals:    17 0400 17 0546 17 0800 17 1204   BP: 126/80  116/82    Pulse: 87  78    Temp: 38.6 °C (101.4 °F) 37.3 °C (99.2 °F) 36.6 °C (97.8 °F)    TempSrc:       Resp: 16  24 19   Height:       Weight:       SpO2: 99%  96%      Body mass index is 27.78 kg/(m^2).    Oxygen Therapy:  Pulse Oximetry: 96 %, O2 (LPM): 0, O2 Delivery: None (Room Air)    Physical Exam   Constitutional: She is well-developed, well-nourished, and in no distress.   Somnolent, but arousable. Got up to use the bathroom without difficulty during interview   HENT:   Head:  Atraumatic.   Eyes: Pupils are equal, round, and reactive to light.   L eye has increased conjunctival injection and lateral canthus chemosis compared to yesterday, EOM limited laterally and inferiorly likely 2/2 chemosis. Pupils reactive to light. Descreased erythema and edema of L eyelid.     Skin: Abrasion and lesion noted. She is diaphoretic. There is erythema.        Localized erythema and edema over the left lateral forehead at hairline, decreased from yesterday.  Fluctuant and warm surrounding nidus of infection above left lateral brow at the hairline, with some evidence of necrosis              Lab Data Review:      1/5/2017  1:44 PM    Recent Labs      01/03/17   1055  01/04/17   0208  01/05/17   0255   SODIUM  134*  131*  133*   POTASSIUM  4.0  3.5*  3.6   CHLORIDE  101  101  102   CO2  22  21  22   BUN  5*  5*  6*   CREATININE  0.54  0.55  0.45*   MAGNESIUM  1.8   --   1.6   CALCIUM  9.5  8.6  8.4*       Recent Labs      01/03/17   1055  01/04/17   0208  01/05/17   0255   ALTSGPT  8  9  68*   ASTSGOT  13  9*  91*   ALKPHOSPHAT  80  65  83   TBILIRUBIN  0.5  0.6  0.4   GLUCOSE  124*  109*  118*       Recent Labs      01/03/17   1055  01/04/17   0208  01/05/17   0255   RBC  5.05  4.32  4.20   HEMOGLOBIN  14.1  12.2  11.6*   HEMATOCRIT  45.8  37.3  36.2*   PLATELETCT  266  234  188       Recent Labs      01/03/17   1055  01/04/17   0208  01/05/17   0255   WBC  17.6*  14.4*  5.7   NEUTSPOLYS  88.60*  92.40*  74.60*   LYMPHOCYTES  4.70*  3.30*  9.60*   MONOCYTES  6.00  3.30  2.60   EOSINOPHILS  0.10  0.10  0.90   BASOPHILS  0.30  0.20  0.00   ASTSGOT  13  9*  91*   ALTSGPT  8  9  68*   ALKPHOSPHAT  80  65  83   TBILIRUBIN  0.5  0.6  0.4       Bands 11.4% (5 Jan 2017)    Assessment/Plan     Cellulitis of left orbit  Assessment & Plan  Assessment:  -Left upper face swelling/erythema involving upper eyelid and eye  - On admission: acute onset x 2 days after pimple appeared on the left side of forehead;  progressively involved the left eyelid with swelling and unable to open the eye    - on admission: grossly intact extra-ocular muscles movement; with normal vision 20/23 with hand-held snellen chart    - on admission: WBC count: 17.6, VS: /76, pulse 83, temp 36.5 °C (97.7 °F), SpO2 100 %. RA   - tonometry in ED: 12 RE, 24 LE  - CT orbits: orbital cellulitis  - 1/4/17: worsening swelling of eyelid; EOM restricted (partly due to worsening chemosis), Vision intact 20/30, light reflex normal; ophthalmology on board; erythema has reduced and regressed from the initial marking; also purulent substance from the pustule, sent for culture.   Plan:  -  pending Blood Cultures    - On C3 and Vancomycin;   - ordered MR head w/wo: Impression: mild orbital and periorbital cellulitis, mild proptosis, no abscess, no evidence of cavernous sinus thrombosis.    On 1/5/17:   -Pharmacy stated that vancomycin trough was subtherapeutic, aiming for 12-16mcg/mL, have adjusted dose to 1200mg Iv q8.    - Ophthalmology following closely: started patient on tobramycin-dexamethasone drops q2hrs.   -Per Dr. García (optho) orbital celullitis is noticeable improved today. However, Patient will remain in house because of evidence of active infection (bands 11.4% and positive S. aureus lesion cultures, signs of active infection), ctm  - regular eye exam; update ophthalmology with any changes.   -consider ID consult if signs of worsening systemic signs of infection  - tylenol prn for pain and fever.  - ctm         Hyperthyroidism  Assessment & Plan  - TSH: 0.280  - Free T-4: 2.12  - no physical symptoms concerning hyperthyroidism; except HR >100, however, patient has fever with infection.  - will need further workup once acute complaint is more stable.

## 2017-01-05 NOTE — CARE PLAN
Problem: Safety  Goal: Will remain free from falls  Outcome: PROGRESSING AS EXPECTED  Pt. Sleepy but easily arousable, with L facial swelling affecting eye lids. Bed alarm ON, bed kept low and locked, call light within reach, assisted as necessary.    Problem: Infection  Goal: Will remain free from infection  Outcome: PROGRESSING AS EXPECTED  Pt. Afebrile. IV ATB in use.

## 2017-01-05 NOTE — PROGRESS NOTES
Received report from day shift RN, assumed care. Pt. Is asleep but easily arousable. A&Ox4, standby assist. Pt. denies pain. Due medications given. On continuous IV infusion at 250 cc/hr , tolerating well. Plan of care was safety, comfort and rest. Discussed plan of care. Bed alarm in use, call light and personal belongings within reach, bed kept low, treaded socks on. Assisted as necessary. Kept rested and comfortable at all times.    Pt. Facial swelling better per pt. Intact vision on L eye.

## 2017-01-06 PROBLEM — D64.9 ANEMIA: Status: ACTIVE | Noted: 2017-01-06

## 2017-01-06 LAB
ALBUMIN SERPL BCP-MCNC: 3 G/DL (ref 3.2–4.9)
ALBUMIN/GLOB SERPL: 1.1 G/DL
ALP SERPL-CCNC: 84 U/L (ref 30–99)
ALT SERPL-CCNC: 80 U/L (ref 2–50)
AMPHET UR CFM-MCNC: >5000 NG/ML
ANION GAP SERPL CALC-SCNC: 8 MMOL/L (ref 0–11.9)
ANISOCYTOSIS BLD QL SMEAR: ABNORMAL
AST SERPL-CCNC: 53 U/L (ref 12–45)
BACTERIA WND AEROBE CULT: ABNORMAL
BACTERIA WND AEROBE CULT: ABNORMAL
BASOPHILS # BLD AUTO: 0.9 % (ref 0–1.8)
BASOPHILS # BLD: 0.04 K/UL (ref 0–0.12)
BILIRUB SERPL-MCNC: 0.2 MG/DL (ref 0.1–1.5)
BUN SERPL-MCNC: 5 MG/DL (ref 8–22)
CALCIUM SERPL-MCNC: 8.2 MG/DL (ref 8.5–10.5)
CHLORIDE SERPL-SCNC: 104 MMOL/L (ref 96–112)
CO2 SERPL-SCNC: 24 MMOL/L (ref 20–33)
CREAT SERPL-MCNC: 0.35 MG/DL (ref 0.5–1.4)
EKG IMPRESSION: NORMAL
EOSINOPHIL # BLD AUTO: 0 K/UL (ref 0–0.51)
EOSINOPHIL NFR BLD: 0 % (ref 0–6.9)
ERYTHROCYTE [DISTWIDTH] IN BLOOD BY AUTOMATED COUNT: 39.8 FL (ref 35.9–50)
GFR SERPL CREATININE-BSD FRML MDRD: >60 ML/MIN/1.73 M 2
GLOBULIN SER CALC-MCNC: 2.8 G/DL (ref 1.9–3.5)
GLUCOSE SERPL-MCNC: 110 MG/DL (ref 65–99)
GRAM STN SPEC: ABNORMAL
HCT VFR BLD AUTO: 33.9 % (ref 37–47)
HGB BLD-MCNC: 10.9 G/DL (ref 12–16)
LYMPHOCYTES # BLD AUTO: 0.92 K/UL (ref 1–4.8)
LYMPHOCYTES NFR BLD: 23.5 % (ref 22–41)
MAGNESIUM SERPL-MCNC: 1.8 MG/DL (ref 1.5–2.5)
MANUAL DIFF BLD: NORMAL
MCH RBC QN AUTO: 27.2 PG (ref 27–33)
MCHC RBC AUTO-ENTMCNC: 32.2 G/DL (ref 33.6–35)
MCV RBC AUTO: 84.5 FL (ref 81.4–97.8)
MDA UR CFM-MCNC: <200 NG/ML
MDEA UR CFM-MCNC: <200 NG/ML
MDMA UR CFM-MCNC: <200 NG/ML
METHAMPHET UR CFM-MCNC: NORMAL NG/ML
MICROCYTES BLD QL SMEAR: ABNORMAL
MONOCYTES # BLD AUTO: 0.1 K/UL (ref 0–0.85)
MONOCYTES NFR BLD AUTO: 2.6 % (ref 0–13.4)
MORPHOLOGY BLD-IMP: NORMAL
NEUTROPHILS # BLD AUTO: 2.85 K/UL (ref 2–7.15)
NEUTROPHILS NFR BLD: 66.1 % (ref 44–72)
NEUTS BAND NFR BLD MANUAL: 6.9 % (ref 0–10)
NRBC # BLD AUTO: 0 K/UL
NRBC BLD AUTO-RTO: 0 /100 WBC
PLATELET # BLD AUTO: 187 K/UL (ref 164–446)
PLATELET BLD QL SMEAR: NORMAL
PMV BLD AUTO: 9.3 FL (ref 9–12.9)
POTASSIUM SERPL-SCNC: 3.1 MMOL/L (ref 3.6–5.5)
PROT SERPL-MCNC: 5.8 G/DL (ref 6–8.2)
RBC # BLD AUTO: 4.01 M/UL (ref 4.2–5.4)
RBC BLD AUTO: PRESENT
SIGNIFICANT IND 70042: ABNORMAL
SITE SITE: ABNORMAL
SODIUM SERPL-SCNC: 136 MMOL/L (ref 135–145)
SOURCE SOURCE: ABNORMAL
VANCOMYCIN TROUGH SERPL-MCNC: 10.9 UG/ML (ref 10–20)
WBC # BLD AUTO: 3.9 K/UL (ref 4.8–10.8)

## 2017-01-06 PROCEDURE — 80202 ASSAY OF VANCOMYCIN: CPT

## 2017-01-06 PROCEDURE — 700111 HCHG RX REV CODE 636 W/ 250 OVERRIDE (IP)

## 2017-01-06 PROCEDURE — 700101 HCHG RX REV CODE 250: Performed by: OPHTHALMOLOGY

## 2017-01-06 PROCEDURE — 700102 HCHG RX REV CODE 250 W/ 637 OVERRIDE(OP): Performed by: INTERNAL MEDICINE

## 2017-01-06 PROCEDURE — 85007 BL SMEAR W/DIFF WBC COUNT: CPT

## 2017-01-06 PROCEDURE — A9270 NON-COVERED ITEM OR SERVICE: HCPCS | Performed by: INTERNAL MEDICINE

## 2017-01-06 PROCEDURE — 99232 SBSQ HOSP IP/OBS MODERATE 35: CPT | Mod: GC | Performed by: INTERNAL MEDICINE

## 2017-01-06 PROCEDURE — 80053 COMPREHEN METABOLIC PANEL: CPT

## 2017-01-06 PROCEDURE — 700105 HCHG RX REV CODE 258: Performed by: INTERNAL MEDICINE

## 2017-01-06 PROCEDURE — 93010 ELECTROCARDIOGRAM REPORT: CPT | Performed by: INTERNAL MEDICINE

## 2017-01-06 PROCEDURE — A9270 NON-COVERED ITEM OR SERVICE: HCPCS | Performed by: HOSPITALIST

## 2017-01-06 PROCEDURE — 700111 HCHG RX REV CODE 636 W/ 250 OVERRIDE (IP): Performed by: HOSPITALIST

## 2017-01-06 PROCEDURE — 700102 HCHG RX REV CODE 250 W/ 637 OVERRIDE(OP): Performed by: EMERGENCY MEDICINE

## 2017-01-06 PROCEDURE — 700105 HCHG RX REV CODE 258

## 2017-01-06 PROCEDURE — A9270 NON-COVERED ITEM OR SERVICE: HCPCS | Performed by: EMERGENCY MEDICINE

## 2017-01-06 PROCEDURE — 93005 ELECTROCARDIOGRAM TRACING: CPT | Performed by: INTERNAL MEDICINE

## 2017-01-06 PROCEDURE — 83735 ASSAY OF MAGNESIUM: CPT

## 2017-01-06 PROCEDURE — 36415 COLL VENOUS BLD VENIPUNCTURE: CPT

## 2017-01-06 PROCEDURE — 87040 BLOOD CULTURE FOR BACTERIA: CPT

## 2017-01-06 PROCEDURE — 85027 COMPLETE CBC AUTOMATED: CPT

## 2017-01-06 PROCEDURE — 770021 HCHG ROOM/CARE - ISO PRIVATE

## 2017-01-06 PROCEDURE — 700102 HCHG RX REV CODE 250 W/ 637 OVERRIDE(OP): Performed by: HOSPITALIST

## 2017-01-06 RX ORDER — SODIUM CHLORIDE 9 MG/ML
1000 INJECTION, SOLUTION INTRAVENOUS ONCE
Status: COMPLETED | OUTPATIENT
Start: 2017-01-06 | End: 2017-01-06

## 2017-01-06 RX ORDER — MAGNESIUM SULFATE HEPTAHYDRATE 40 MG/ML
2 INJECTION, SOLUTION INTRAVENOUS ONCE
Status: COMPLETED | OUTPATIENT
Start: 2017-01-06 | End: 2017-01-06

## 2017-01-06 RX ORDER — POTASSIUM CHLORIDE 20 MEQ/1
40 TABLET, EXTENDED RELEASE ORAL 2 TIMES DAILY
Status: DISCONTINUED | OUTPATIENT
Start: 2017-01-06 | End: 2017-01-07

## 2017-01-06 RX ADMIN — TOBRAMYCIN AND DEXAMETHASONE 1 DROP: 3; 1 SUSPENSION/ DROPS OPHTHALMIC at 08:05

## 2017-01-06 RX ADMIN — POTASSIUM CHLORIDE 40 MEQ: 1500 TABLET, EXTENDED RELEASE ORAL at 08:04

## 2017-01-06 RX ADMIN — HYDROCODONE BITARTRATE AND ACETAMINOPHEN 2 TABLET: 5; 325 TABLET ORAL at 19:50

## 2017-01-06 RX ADMIN — HYDROCODONE BITARTRATE AND ACETAMINOPHEN 2 TABLET: 5; 325 TABLET ORAL at 02:00

## 2017-01-06 RX ADMIN — VANCOMYCIN HYDROCHLORIDE 1400 MG: 10 INJECTION, POWDER, LYOPHILIZED, FOR SOLUTION INTRAVENOUS at 16:12

## 2017-01-06 RX ADMIN — TOBRAMYCIN AND DEXAMETHASONE 1 DROP: 3; 1 SUSPENSION/ DROPS OPHTHALMIC at 19:51

## 2017-01-06 RX ADMIN — TOBRAMYCIN AND DEXAMETHASONE 1 DROP: 3; 1 SUSPENSION/ DROPS OPHTHALMIC at 18:00

## 2017-01-06 RX ADMIN — Medication 100 MG: at 08:04

## 2017-01-06 RX ADMIN — VANCOMYCIN HYDROCHLORIDE 1200 MG: 100 INJECTION, POWDER, LYOPHILIZED, FOR SOLUTION INTRAVENOUS at 08:13

## 2017-01-06 RX ADMIN — TOBRAMYCIN AND DEXAMETHASONE 1 DROP: 3; 1 SUSPENSION/ DROPS OPHTHALMIC at 06:04

## 2017-01-06 RX ADMIN — HYDROCODONE BITARTRATE AND ACETAMINOPHEN 2 TABLET: 5; 325 TABLET ORAL at 10:24

## 2017-01-06 RX ADMIN — TOBRAMYCIN AND DEXAMETHASONE 1 DROP: 3; 1 SUSPENSION/ DROPS OPHTHALMIC at 00:00

## 2017-01-06 RX ADMIN — TOBRAMYCIN AND DEXAMETHASONE 1 DROP: 3; 1 SUSPENSION/ DROPS OPHTHALMIC at 10:12

## 2017-01-06 RX ADMIN — SODIUM CHLORIDE 1000 ML: 9 INJECTION, SOLUTION INTRAVENOUS at 12:10

## 2017-01-06 RX ADMIN — MAGNESIUM SULFATE IN WATER 2 G: 40 INJECTION, SOLUTION INTRAVENOUS at 09:51

## 2017-01-06 RX ADMIN — TOBRAMYCIN AND DEXAMETHASONE 1 DROP: 3; 1 SUSPENSION/ DROPS OPHTHALMIC at 04:00

## 2017-01-06 RX ADMIN — ENOXAPARIN SODIUM 40 MG: 100 INJECTION SUBCUTANEOUS at 08:04

## 2017-01-06 RX ADMIN — TOBRAMYCIN AND DEXAMETHASONE 1 DROP: 3; 1 SUSPENSION/ DROPS OPHTHALMIC at 02:11

## 2017-01-06 RX ADMIN — TOBRAMYCIN AND DEXAMETHASONE 1 DROP: 3; 1 SUSPENSION/ DROPS OPHTHALMIC at 16:12

## 2017-01-06 RX ADMIN — TOBRAMYCIN AND DEXAMETHASONE 1 DROP: 3; 1 SUSPENSION/ DROPS OPHTHALMIC at 12:09

## 2017-01-06 ASSESSMENT — ENCOUNTER SYMPTOMS
NEUROLOGICAL NEGATIVE: 1
SORE THROAT: 0
PHOTOPHOBIA: 0
WEIGHT LOSS: 0
GASTROINTESTINAL NEGATIVE: 1
FEVER: 0
BLURRED VISION: 0
DIAPHORESIS: 0
CONSTIPATION: 0
PSYCHIATRIC NEGATIVE: 1
RESPIRATORY NEGATIVE: 1
COUGH: 0
ABDOMINAL PAIN: 0
EYE DISCHARGE: 0
MUSCULOSKELETAL NEGATIVE: 1
SPUTUM PRODUCTION: 0
ORTHOPNEA: 0
VOMITING: 0
CHILLS: 0
EYE REDNESS: 1
CARDIOVASCULAR NEGATIVE: 1
FEVER: 1
DIARRHEA: 0
HEADACHES: 0
WHEEZING: 0
EYE PAIN: 1
DOUBLE VISION: 0
MYALGIAS: 0
NAUSEA: 0
DOUBLE VISION: 1
WEAKNESS: 0
FALLS: 0

## 2017-01-06 ASSESSMENT — PAIN SCALES - GENERAL
PAINLEVEL_OUTOF10: 7
PAINLEVEL_OUTOF10: 5
PAINLEVEL_OUTOF10: 5
PAINLEVEL_OUTOF10: 8

## 2017-01-06 NOTE — CARE PLAN
Problem: Safety  Goal: Will remain free from injury  Outcome: PROGRESSING AS EXPECTED  Fall prec in place. Bed alarm is on. Treaded socks on.     Problem: Knowledge Deficit  Goal: Knowledge of disease process/condition, treatment plan, diagnostic tests, and medications will improve  Outcome: PROGRESSING AS EXPECTED  On IV abx per order, (+) MRSA per wound culture.     Problem: Pain Management  Goal: Pain level will decrease to patient’s comfort goal  Outcome: PROGRESSING AS EXPECTED  Pain medication given per MAR as needed.

## 2017-01-06 NOTE — PROGRESS NOTES
Received call from Lab c/o Jazmín pt. Is positive with MRSA on the open wound on the L side of head. Dr. Ruiz updated. Placed on contact iso. Charge RN aware.

## 2017-01-06 NOTE — PROGRESS NOTES
Received pt. In bed asleep but easily wakes up. Pt. Has a intact PIV on NS. Ongoing IV abx. Pt. L side of forehead has wound, cultures done on the pt. Up self to the bathroom. L eye slightly closed but pt. Stated still able to see. Medicated with pain meds as needed. Due meds given. All needs attended.

## 2017-01-06 NOTE — PROGRESS NOTES
"Pharmacy Kinetics 31 y.o. female on vancomycin day # 4 2017    Currently on Vancomycin 1200 mg iv q8hr (0730, 1530, 2330)    Indication for Treatment: facial cellulitis    Pertinent history per medical record: Admitted on 1/3/2017 for facial cellulitis.  Patient with left forehead swelling above eye.  She thought it was a pimple, but today is much larger and painful.  CT orbits with possible cellulitis.    Other antibiotics: none    Allergies: Amoxicillin and Penicillins     List concerns for renal function: transaminitis    Pertinent cultures to date:   17: wound (head) POS - methicillin resistant staphylococcus aureus, heavy growth  1/3/17: blood (peripheral) POS - gram positive cocci, possible staphylococcus sp. x2    Recent Labs      17   0208  17   0255  17   0150   WBC  14.4*  5.7  3.9*   NEUTSPOLYS  92.40*  74.60*  66.10   BANDSSTABS   --   11.40*  6.90     Recent Labs      17   0208  17   0255  17   0150   BUN  5*  6*  5*   CREATININE  0.55  0.45*  0.35*   ALBUMIN  3.6  3.2  3.0*     Recent Labs      17   1607  17   0711   VANCOTROUGH  5.9*  10.9     Intake/Output Summary (Last 24 hours) at 17 1156  Last data filed at 17 0400   Gross per 24 hour   Intake    300 ml   Output      0 ml   Net    300 ml      Blood pressure 120/75, pulse 71, temperature 37.2 °C (98.9 °F), temperature source Temporal, resp. rate 16, height 1.575 m (5' 2.01\"), weight 68.9 kg (151 lb 14.4 oz), last menstrual period 2011, SpO2 98 %. Temp (24hrs), Av.7 °C (98 °F), Min:35.9 °C (96.7 °F), Max:37.2 °C (98.9 °F)      A/P   1. Vancomycin dose change: 1400 mg IV q8h  2. Next vancomycin level: 2-3 days  3. Goal trough: 12-16 mcg/ml  4. Comments: level again subtherapeutic but much closer to goal.  All doses have been given and within an acceptable time frame.  Will increase dosing further to 1400 mg (20 mg/kg) q8h.  Will recheck a level in a couple of days if " therapy still ongoing.  Pharmacy will continue to monitor and adjust dosing if needed.      Sonia Gonzalez, VincentD

## 2017-01-06 NOTE — CARE PLAN
Problem: Infection  Goal: Will remain free from infection  Standard precautions will be used

## 2017-01-06 NOTE — CONSULTS
DATE OF SERVICE:  01/05/2017    REQUESTING PHYSICIAN:  Edvin Apple MD    IDENTIFICATION:  A 31-year-old female seen for facial cellulitis and   bacteremia.    HISTORY OF PRESENT ILLNESS:  This is a patient who a few days ago noted a   pimple over the forehead, redness and swelling developed and it extended so   that her upper eyelid swelled and she was not able to see out of her left   side.  She could not open the eye completely.  She has had no similar problems   in the past.  No fever, no chills.  She was admitted here to the hospital and   started empirically on antibiotics.  Evaluation by radiologic means and by   the ophthalmologist showed a diagnosis of facial cellulitis without any   postseptal involvement.  The patient has been on vancomycin and ceftriaxone   since and is doing better.  She still has some discomfort.  Her white count   has been coming down, temperature is coming down but unfortunately now she has   been found to have positive blood cultures.  She now is seen for antibiotic   advice.  The patient denies any other medical problems.  She denies having any   insect bite or scratching there.  She has not had these kind of infections   previously.    PAST MEDICAL HISTORY:  Negative.  However, she was found to have amphetamines   in her system on admission here.    ALLERGIES:  SHE STATES THAT WHEN SHE WAS A KID SHE HAD ALLERGIC REACTION TO   PENICILLIN AND AMPICILLIN GIVEN SOME SWELLING, but has tolerated present   antibiotics without any problems.    SOCIAL HISTORY:  she lives with her family.    REVIEW OF SYSTEMS:  No history of any heart disease, heart murmur.  No history   of any respiratory complaints, cough, sputum production.  No history of   diarrhea.  She has had some headache and denies depression.    MEDICATIONS:  At present, she is presently on ceftriaxone 2 g daily, and she   is on vancomycin at a dose of 1.2 g every 8 hours with levels being followed   by the pharmacist and she is  on pain medicines.    PHYSICAL EXAMINATION:  GENERAL:  She is a quiet, presently afebrile female, who has obviously   swelling of left side of her face.  She is not toxic appearing and able to   answer appropriately in her left upper forehead shows a somewhat necrotic   appearing lesion that is swollen and tender.  There is some swelling that   extends over the eye.  She has some mild erythema of her conjunctiva, but she   seems to have good extraocular motion.  HEENT:  She has no oral lesions.  NECK:  Shows some posterior cervical lymph nodes.  LUNGS:  Clear.  I do not appreciate any murmur and she is moving all   extremities with good pulses and no edema.    LABORATORY DATA:  Her white count on admission was 17,000, down to 5000 today.    Her renal function is normal.  Transaminases were normal on admission and   now they are somewhat elevated.  Albumin was 3.6.  Her urine tox screen was   positive for opiates and amphetamines.  Blood cultures are positive for   gram-positive cocci in clusters.  Her head wound is growing Staph aureus,   heavy growth, susceptibility pending.    ASSESSMENT:  This is a 31-year-old previously healthy female who presented   with a spontaneous development of an  abscess on her left upper forehead   extending over her eye, but not involving it, so this is a preseptal facial   cellulitis with a necrotic lesion there and now with bacteremia.  She is   improving at the present time; however, has positive blood cultures, so she   will need  an extended course of intravenous therapy.    RECOMMENDATIONS:  1.  Stop the Rocephin.  2.  Continue the vancomycin.  We should have the susceptibility tomorrow and   if it is MRSA, I would continue the vancomycin. If it  is methicillin   Susceptible, switch her to cefazolin 2 g iv every 8 hours.  We will be   looking at least 2 weeks of IV therapy.  I would also repeat blood cultures to   see if the  bacteremia is resolving and also obtain at least a  transthoracic echo   to make sure, we do not have any secondary complications such as seeding for   heart valve.  If she remains bacteremic despite the antibiotics then I would   proceed to a transesophageal echo.    Thank you very much.  We will continue follow patient with you.       ____________________________________     MD ZULEYMA BOWEN / JAYDEN    DD:  01/05/2017 15:43:52  DT:  01/05/2017 16:09:33    D#:  [id number]  Job#:  [id number]

## 2017-01-06 NOTE — PROGRESS NOTES
"                               [facility] Internal Medicine Interval Note    Name Rola Carrillo     1985   Age/Sex 31 y.o. female   MRN [id number]   Code Status FULL     After 5PM or if no immediate response to page, please call for cross-coverage  Attending/Team: Blue/Adan Call [phone] to page   1st Call - Day Intern (R1):   Sara 2nd Call - Day Sr. Resident (R2/R3):   Anabel         Chief complaint/ reason for interval visit (Primary Diagnosis)   Orbital celullitis and facial celullitis    Interval Problem Daily Status Update    Active Hospital Problems    Diagnosis   • Bacteremia due to Staphylococcus [R78.81]   • Elevated liver enzymes [R74.8]   • Hyperthyroidism [E05.90]   • Cellulitis of left orbit [H05.012]       Review of Systems   Constitutional: Negative for fever, chills, malaise/fatigue and diaphoresis.   Eyes: Positive for double vision and redness. Negative for blurred vision and discharge.        Patient states that \"outside of the eye\" is more painful now that she is able to open the L lid to about 20% of full range. She denies opthalmoplegia.   Respiratory: Negative for cough, sputum production and wheezing.    Cardiovascular: Negative for chest pain and orthopnea.   Gastrointestinal: Negative for nausea, vomiting, abdominal pain, diarrhea and constipation.   Genitourinary: Negative for dysuria, urgency and frequency.   Musculoskeletal: Negative for myalgias and falls.   Neurological: Negative for weakness and headaches.       Consultants/Specialty  Opthalmology  Infectious Disease      Disposition  Hospital    Quality Measures  Labs reviewed        DVT Prophylaxis: Enoxaparin (Lovenox)                  Physical Exam       Filed Vitals:    17 2000 17 0400 17 0445 17 0800   BP: 109/59 105/57  120/75   Pulse: 79 75  71   Temp: 36.3 °C (97.4 °F) 35.9 °C (96.7 °F) 36.8 °C (98.2 °F) 37.2 °C (98.9 °F)   TempSrc:       Resp: 16 16  16   Height:       Weight:       SpO2: " 97% 92%  98%     Body mass index is 27.78 kg/(m^2).    Oxygen Therapy:  Pulse Oximetry: 98 %, O2 (LPM): 0, O2 Delivery: None (Room Air)    Physical Exam   Constitutional: She is oriented to person, place, and time and well-developed, well-nourished, and in no distress. No distress.   Eyes: Right eye exhibits no discharge. Left eye exhibits no discharge.   R eye nml; L eye PERRL, limited EOM inferiorly and laterally. Decreased erythema and chemosis, persistent conjunctival injection of the left eye.    Cardiovascular: Normal rate, regular rhythm and normal heart sounds.  Exam reveals no gallop and no friction rub.    No murmur heard.  Pulmonary/Chest: Effort normal and breath sounds normal.   Musculoskeletal: She exhibits no edema or tenderness.   Neurological: She is alert and oriented to person, place, and time. GCS score is 15.   Skin: She is not diaphoretic.         Lab Data Review:      1/6/2017  1:57 PM    Recent Labs      01/04/17 0208 01/05/17 0255 01/06/17   0150   SODIUM  131*  133*  136   POTASSIUM  3.5*  3.6  3.1*   CHLORIDE  101  102  104   CO2  21  22  24   BUN  5*  6*  5*   CREATININE  0.55  0.45*  0.35*   MAGNESIUM   --   1.6  1.8   CALCIUM  8.6  8.4*  8.2*       Recent Labs      01/04/17 0208 01/05/17 0255 01/06/17   0150   ALTSGPT  9  68*  80*   ASTSGOT  9*  91*  53*   ALKPHOSPHAT  65  83  84   TBILIRUBIN  0.6  0.4  0.2   GLUCOSE  109*  118*  110*       Recent Labs      01/04/17 0208 01/05/17 0255  01/06/17   0150   RBC  4.32  4.20  4.01*   HEMOGLOBIN  12.2  11.6*  10.9*   HEMATOCRIT  37.3  36.2*  33.9*   PLATELETCT  234  188  187       Recent Labs      01/04/17 0208 01/05/17 0255 01/06/17   0150   WBC  14.4*  5.7  3.9*   NEUTSPOLYS  92.40*  74.60*  66.10   LYMPHOCYTES  3.30*  9.60*  23.50   MONOCYTES  3.30  2.60  2.60   EOSINOPHILS  0.10  0.90  0.00   BASOPHILS  0.20  0.00  0.90   ASTSGOT  9*  91*  53*   ALTSGPT  9  68*  80*   ALKPHOSPHAT  65  83  84   TBILIRUBIN  0.6  0.4   "0.2           Assessment/Plan     Cellulitis of left orbit  Assessment & Plan  Assessment:  -Left upper face swelling/erythema involving upper eyelid and eye  - On admission: acute onset x 2 days after pimple appeared on the left side of forehead; progressively involved the left eyelid with swelling and unable to open the eye    - on admission: grossly intact extra-ocular muscles movement; with normal vision 20/23 with hand-held snellen chart    - on admission: WBC count: 17.6, VS: /76, pulse 83, temp 36.5 °C (97.7 °F), SpO2 100 %. RA   - tonometry in ED: 12 RE, 24 LE  - CT orbits: orbital cellulitis  - 1/4/17: worsening swelling of eyelid; EOM restricted (partly due to worsening chemosis), Vision intact 20/30, light reflex normal; ophthalmology on board; erythema has reduced and regressed from the initial marking; also purulent substance from the pustule, sent for culture.   -  1/3 & 1/4: BCx (+) for staph aureus     - since admission 1/3: C3 and Vancomycin;   - Ophthalmology following closely: started patient on tobramycin-dexamethasone drops q2hrs  - MR head w/wo: Impression: mild orbital and periorbital cellulitis, mild proptosis, no abscess, no evidence of cavernous sinus thrombosis.   > PLAN:   - 1/5: discontinue ceftriaxone & continue IV vancomycin per ID Dr Hilliard (appreciate recommendation)  - pending EKG, ECHO & repeat BCx  - regular eye exam; update ophthalmology with any changes.   - tylenol prn for pain and fever.  - ctm       Hyperthyroidism  Assessment & Plan  - TSH: 0.280  - Free T-4: 2.12  - no physical symptoms concerning hyperthyroidism; except HR >100, however, patient has fever with infection   - will need further workup once acute complaint is more stable.   - consider TSI/ US     Bacteremia  - had two separate gram stains and cultures: one of her forehead lesion (\"pimple\") and the other of her blood. The wound culture showed heavy growth MRSA and the blood culture showed gram " positive cocci possible S aureus.   -Plan:  Stop ceftraixone now and continue vancomycin unless blood cultures are confirmed as otherwise (e.g, MSSA, then switch to cefazolin). Repeat blood cultures 1/7/17 and f/u to ensure resolution of bacteremia.   -If bacteremia does not resolve, then perform LUZ per ID consult    Transaminitis  -1/6/17 shows elevated liver labs (AST, ALT)  -IV fluids as needed  -ctm    Anemia  -Labs shows decreased Hbg and Hct likely consistent with inflammatory anemia and post-menstrual period (patient endorsed menorrhagia this last menstrual cycle).   -ctm

## 2017-01-06 NOTE — CARE PLAN
Problem: Safety  Goal: Will remain free from injury  Patient will be reminded to use the call light

## 2017-01-06 NOTE — CARE PLAN
Problem: Safety  Goal: Will remain free from falls  Intervention: Implement fall precautions  Call light and personal belongings within reach. Pt instructed to call for assistance, pt verbalizes understanding. Non skid socks on. Bed in lowest position.       Problem: Pain Management  Goal: Pain level will decrease to patient’s comfort goal  Intervention: Follow pain managment plan developed in collaboration with patient and Interdisciplinary Team  Patient c/o pain. Medications given with good results. Pre and post pain assessment documented.

## 2017-01-06 NOTE — PROGRESS NOTES
Patient alert/orientedx4,room air,call for assistance as needed,left eye swelling without drainage with eyedrop applied,hourly rounding in place,refused bed alarm despite education.

## 2017-01-06 NOTE — PROGRESS NOTES
Critical lab was called in for patient by Anusha.  2 blood cultures revealed gram positive cocc possible staph.  HUNG Cole was contacted.  No isolation measures needed.

## 2017-01-07 LAB
ALBUMIN SERPL BCP-MCNC: 3.1 G/DL (ref 3.2–4.9)
ALBUMIN/GLOB SERPL: 1.1 G/DL
ALP SERPL-CCNC: 101 U/L (ref 30–99)
ALT SERPL-CCNC: 74 U/L (ref 2–50)
ANION GAP SERPL CALC-SCNC: 9 MMOL/L (ref 0–11.9)
ANISOCYTOSIS BLD QL SMEAR: ABNORMAL
AST SERPL-CCNC: 46 U/L (ref 12–45)
BACTERIA BLD CULT: ABNORMAL
BASOPHILS # BLD AUTO: 0 % (ref 0–1.8)
BASOPHILS # BLD: 0 K/UL (ref 0–0.12)
BILIRUB SERPL-MCNC: 0.2 MG/DL (ref 0.1–1.5)
BUN SERPL-MCNC: 5 MG/DL (ref 8–22)
CALCIUM SERPL-MCNC: 8.7 MG/DL (ref 8.5–10.5)
CHLORIDE SERPL-SCNC: 107 MMOL/L (ref 96–112)
CO2 SERPL-SCNC: 23 MMOL/L (ref 20–33)
CREAT SERPL-MCNC: 0.43 MG/DL (ref 0.5–1.4)
EOSINOPHIL # BLD AUTO: 0.28 K/UL (ref 0–0.51)
EOSINOPHIL NFR BLD: 7.1 % (ref 0–6.9)
ERYTHROCYTE [DISTWIDTH] IN BLOOD BY AUTOMATED COUNT: 41.2 FL (ref 35.9–50)
GFR SERPL CREATININE-BSD FRML MDRD: >60 ML/MIN/1.73 M 2
GLOBULIN SER CALC-MCNC: 2.9 G/DL (ref 1.9–3.5)
GLUCOSE SERPL-MCNC: 93 MG/DL (ref 65–99)
HCT VFR BLD AUTO: 37.4 % (ref 37–47)
HGB BLD-MCNC: 12.1 G/DL (ref 12–16)
LYMPHOCYTES # BLD AUTO: 1.61 K/UL (ref 1–4.8)
LYMPHOCYTES NFR BLD: 40.2 % (ref 22–41)
MAGNESIUM SERPL-MCNC: 1.8 MG/DL (ref 1.5–2.5)
MANUAL DIFF BLD: NORMAL
MCH RBC QN AUTO: 27.7 PG (ref 27–33)
MCHC RBC AUTO-ENTMCNC: 32.4 G/DL (ref 33.6–35)
MCV RBC AUTO: 85.6 FL (ref 81.4–97.8)
MICROCYTES BLD QL SMEAR: ABNORMAL
MONOCYTES # BLD AUTO: 0.25 K/UL (ref 0–0.85)
MONOCYTES NFR BLD AUTO: 6.3 % (ref 0–13.4)
MORPHOLOGY BLD-IMP: NORMAL
NEUTROPHILS # BLD AUTO: 1.86 K/UL (ref 2–7.15)
NEUTROPHILS NFR BLD: 46.4 % (ref 44–72)
NRBC # BLD AUTO: 0 K/UL
NRBC BLD AUTO-RTO: 0 /100 WBC
PLATELET # BLD AUTO: 256 K/UL (ref 164–446)
PLATELET BLD QL SMEAR: NORMAL
PMV BLD AUTO: 9.2 FL (ref 9–12.9)
POLYCHROMASIA BLD QL SMEAR: NORMAL
POTASSIUM SERPL-SCNC: 3.8 MMOL/L (ref 3.6–5.5)
PROT SERPL-MCNC: 6 G/DL (ref 6–8.2)
RBC # BLD AUTO: 4.37 M/UL (ref 4.2–5.4)
RBC BLD AUTO: PRESENT
SIGNIFICANT IND 70042: ABNORMAL
SIGNIFICANT IND 70042: ABNORMAL
SITE SITE: ABNORMAL
SITE SITE: ABNORMAL
SODIUM SERPL-SCNC: 139 MMOL/L (ref 135–145)
SOURCE SOURCE: ABNORMAL
SOURCE SOURCE: ABNORMAL
WBC # BLD AUTO: 4 K/UL (ref 4.8–10.8)

## 2017-01-07 PROCEDURE — 99232 SBSQ HOSP IP/OBS MODERATE 35: CPT | Mod: GC | Performed by: INTERNAL MEDICINE

## 2017-01-07 PROCEDURE — 83735 ASSAY OF MAGNESIUM: CPT

## 2017-01-07 PROCEDURE — 700111 HCHG RX REV CODE 636 W/ 250 OVERRIDE (IP)

## 2017-01-07 PROCEDURE — 85007 BL SMEAR W/DIFF WBC COUNT: CPT

## 2017-01-07 PROCEDURE — A9270 NON-COVERED ITEM OR SERVICE: HCPCS | Performed by: EMERGENCY MEDICINE

## 2017-01-07 PROCEDURE — 80053 COMPREHEN METABOLIC PANEL: CPT

## 2017-01-07 PROCEDURE — 85027 COMPLETE CBC AUTOMATED: CPT

## 2017-01-07 PROCEDURE — 700102 HCHG RX REV CODE 250 W/ 637 OVERRIDE(OP): Performed by: EMERGENCY MEDICINE

## 2017-01-07 PROCEDURE — 36415 COLL VENOUS BLD VENIPUNCTURE: CPT

## 2017-01-07 PROCEDURE — A9270 NON-COVERED ITEM OR SERVICE: HCPCS | Performed by: HOSPITALIST

## 2017-01-07 PROCEDURE — 700102 HCHG RX REV CODE 250 W/ 637 OVERRIDE(OP): Performed by: HOSPITALIST

## 2017-01-07 PROCEDURE — 700105 HCHG RX REV CODE 258

## 2017-01-07 PROCEDURE — A9270 NON-COVERED ITEM OR SERVICE: HCPCS | Performed by: INTERNAL MEDICINE

## 2017-01-07 PROCEDURE — 770021 HCHG ROOM/CARE - ISO PRIVATE

## 2017-01-07 PROCEDURE — 700111 HCHG RX REV CODE 636 W/ 250 OVERRIDE (IP): Performed by: HOSPITALIST

## 2017-01-07 PROCEDURE — 700102 HCHG RX REV CODE 250 W/ 637 OVERRIDE(OP): Performed by: INTERNAL MEDICINE

## 2017-01-07 RX ORDER — MAGNESIUM SULFATE HEPTAHYDRATE 40 MG/ML
2 INJECTION, SOLUTION INTRAVENOUS ONCE
Status: COMPLETED | OUTPATIENT
Start: 2017-01-07 | End: 2017-01-07

## 2017-01-07 RX ORDER — POTASSIUM CHLORIDE 20 MEQ/1
40 TABLET, EXTENDED RELEASE ORAL ONCE
Status: COMPLETED | OUTPATIENT
Start: 2017-01-07 | End: 2017-01-07

## 2017-01-07 RX ADMIN — TOBRAMYCIN AND DEXAMETHASONE 1 DROP: 3; 1 SUSPENSION/ DROPS OPHTHALMIC at 14:20

## 2017-01-07 RX ADMIN — VANCOMYCIN HYDROCHLORIDE 1400 MG: 10 INJECTION, POWDER, LYOPHILIZED, FOR SOLUTION INTRAVENOUS at 00:12

## 2017-01-07 RX ADMIN — HYDROCODONE BITARTRATE AND ACETAMINOPHEN 2 TABLET: 5; 325 TABLET ORAL at 22:43

## 2017-01-07 RX ADMIN — TOBRAMYCIN AND DEXAMETHASONE 1 DROP: 3; 1 SUSPENSION/ DROPS OPHTHALMIC at 04:47

## 2017-01-07 RX ADMIN — HYDROCODONE BITARTRATE AND ACETAMINOPHEN 2 TABLET: 5; 325 TABLET ORAL at 04:46

## 2017-01-07 RX ADMIN — TOBRAMYCIN AND DEXAMETHASONE 1 DROP: 3; 1 SUSPENSION/ DROPS OPHTHALMIC at 09:19

## 2017-01-07 RX ADMIN — MAGNESIUM SULFATE IN WATER 2 G: 40 INJECTION, SOLUTION INTRAVENOUS at 06:44

## 2017-01-07 RX ADMIN — POTASSIUM CHLORIDE 40 MEQ: 1500 TABLET, EXTENDED RELEASE ORAL at 06:43

## 2017-01-07 RX ADMIN — TOBRAMYCIN AND DEXAMETHASONE 1 DROP: 3; 1 SUSPENSION/ DROPS OPHTHALMIC at 12:23

## 2017-01-07 RX ADMIN — TOBRAMYCIN AND DEXAMETHASONE 1 DROP: 3; 1 SUSPENSION/ DROPS OPHTHALMIC at 18:25

## 2017-01-07 RX ADMIN — TOBRAMYCIN AND DEXAMETHASONE 1 DROP: 3; 1 SUSPENSION/ DROPS OPHTHALMIC at 16:20

## 2017-01-07 RX ADMIN — TOBRAMYCIN AND DEXAMETHASONE 1 DROP: 3; 1 SUSPENSION/ DROPS OPHTHALMIC at 06:44

## 2017-01-07 RX ADMIN — TOBRAMYCIN AND DEXAMETHASONE 1 DROP: 3; 1 SUSPENSION/ DROPS OPHTHALMIC at 22:37

## 2017-01-07 RX ADMIN — VANCOMYCIN HYDROCHLORIDE 1400 MG: 10 INJECTION, POWDER, LYOPHILIZED, FOR SOLUTION INTRAVENOUS at 09:16

## 2017-01-07 RX ADMIN — Medication 100 MG: at 08:15

## 2017-01-07 ASSESSMENT — ENCOUNTER SYMPTOMS
DIAPHORESIS: 0
DOUBLE VISION: 0
FEVER: 1
NEUROLOGICAL NEGATIVE: 1
BLURRED VISION: 0
WEAKNESS: 0
ABDOMINAL PAIN: 0
COUGH: 0
CONSTIPATION: 0
FALLS: 0
NAUSEA: 0
PHOTOPHOBIA: 0
GASTROINTESTINAL NEGATIVE: 1
CHILLS: 0
FEVER: 0
PSYCHIATRIC NEGATIVE: 1
HEADACHES: 0
MUSCULOSKELETAL NEGATIVE: 1
VOMITING: 0
MYALGIAS: 0
ORTHOPNEA: 0
CARDIOVASCULAR NEGATIVE: 1
EYE REDNESS: 1
WHEEZING: 0
RESPIRATORY NEGATIVE: 1
WEIGHT LOSS: 0
EYE PAIN: 1
SPUTUM PRODUCTION: 0
DIARRHEA: 0
SORE THROAT: 0
EYE DISCHARGE: 0
EYE PAIN: 0
SHORTNESS OF BREATH: 0

## 2017-01-07 ASSESSMENT — PAIN SCALES - GENERAL
PAINLEVEL_OUTOF10: 9
PAINLEVEL_OUTOF10: 6
PAINLEVEL_OUTOF10: 9

## 2017-01-07 NOTE — ASSESSMENT & PLAN NOTE
Hb: on admission H/H: 14.1/45.8----> 10.8/33 1/16, MCV:  Iron: 27 low, TIBC: 344 wnl, % sat: 8 low  (?iron losses)  - pt reported heavy menstrual cycle this time  - also has active infection; likely inflammatory process causing drop in Hb  - will monitor; and if drop further; will consider full anemia workup

## 2017-01-07 NOTE — PROGRESS NOTES
Received report and assumed care of pt. Pt A&Ox4, call light within reach, treaded socks on, bed in low and locked position. Discussed poc with pt. L eye swollen shut. Pt complaining of L facial/eye pain and medicated per MAR. R PIV patent with NS tko and IV abx administered as ordered. Hourly rounding in place.

## 2017-01-07 NOTE — CARE PLAN
Problem: Infection  Goal: Will remain free from infection  Outcome: PROGRESSING AS EXPECTED  Pt receiving IV vanco.    Problem: Pain Management  Goal: Pain level will decrease to patient’s comfort goal  Outcome: PROGRESSING AS EXPECTED  Pt requests pain medications appropriately. Pain managed adequately.

## 2017-01-07 NOTE — CARE PLAN
Problem: Infection  Goal: Will remain free from infection  Outcome: PROGRESSING AS EXPECTED  Admitted for cellulitis, IV abx and eye drops given to affected area, contact isolation in place, strict hand hygiene    Problem: Venous Thromboembolism (VTW)/Deep Vein Thrombosis (DVT) Prevention:  Goal: Patient will participate in Venous Thrombosis (VTE)/Deep Vein Thrombosis (DVT)Prevention Measures  Outcome: PROGRESSING AS EXPECTED  Refused her Lovenox shot but pt is ambulatory

## 2017-01-07 NOTE — PROGRESS NOTES
At 1102, Trell from Lab called RN to notify that pt's blood culture came back + MRSA. Paged [facility] MD to notify. At 1126, MD Ruiz called back and was notified of BC result, no new orders received.

## 2017-01-07 NOTE — PROGRESS NOTES
[facility] [place] [facility] Interval Note    Name Rola Carrillo 1985   Age/Sex 31 y.o. female   MRN [id number]   Code Status Full     After 5PM or if no immediate response to page, please call for cross-coverage  Attending/Team: Douglas/Dr. Apple Call [phone] to page   1st Call - Day Intern (R1):   Dr. Ruiz 2nd Call - Day Sr. Resident (R2/R3):   Dr. Little         Chief complaint/ reason for interval visit (Primary Diagnosis)   Left pre and post septal orbital cellulitis and left forehead    Interval Problem Daily Status Update    - Patient is feeling well today; Left eye improved, swelling decreased.   - no fever today; VS stable  - repeat blood culture + for MRSA   - continue vanco   - closely monitor the left eye      Active Hospital Problems    Diagnosis   • Hyperthyroidism [E05.90]   • Cellulitis of left orbit [H05.012]       Review of Systems   Constitutional: Positive for fever. Negative for chills and weight loss.   HENT: Negative for congestion, nosebleeds and sore throat.    Eyes: Positive for pain and redness. Negative for blurred vision, double vision, photophobia and discharge.        Left eyelid able to open better than before  Right eye wnl   Respiratory: Negative.    Cardiovascular: Negative.    Gastrointestinal: Negative.    Genitourinary: Negative.    Musculoskeletal: Negative.    Skin:        Erythema of left upper face and periorbital region has significantly improved.    Neurological: Negative.  Negative for headaches.   Endo/Heme/Allergies: Negative.    Psychiatric/Behavioral: Negative.        Consultants/Specialty  Ophthalmology  Infectious Disease: Dr. Hilliard    Disposition  Inpatient being treated for orbital cellulitis    Quality Measures  Radiology images reviewed, Labs reviewed and Medications reviewed  Castellanos catheter: No Castellanos      DVT Prophylaxis: Enoxaparin (Lovenox)                  Physical Exam       Filed Vitals:    17 1600  01/06/17 2000 01/07/17 0400 01/07/17 0800   BP: 112/67 113/68 107/67 120/69   Pulse: 67 82 75 69   Temp: 37.1 °C (98.7 °F) 37.4 °C (99.4 °F) 36.6 °C (97.8 °F) 36.6 °C (97.9 °F)   TempSrc:       Resp: 16 18 16 18   Height:       Weight:       SpO2: 97% 100% 98% 98%     Body mass index is 27.78 kg/(m^2).    Oxygen Therapy:  Pulse Oximetry: 98 %, O2 (LPM): 0, O2 Delivery: None (Room Air)    Physical Exam   Constitutional: She is oriented to person, place, and time and well-developed, well-nourished, and in no distress. No distress.   HENT:   Head: Normocephalic and atraumatic.   Eyes: Right eye exhibits no discharge. Left eye exhibits chemosis. Left eye exhibits no discharge. Right conjunctiva is not injected. Right conjunctiva has no hemorrhage. Left conjunctiva is injected. Left conjunctiva has no hemorrhage. No scleral icterus. Right eye exhibits normal extraocular motion and no nystagmus. Left eye exhibits abnormal extraocular motion. Left eye exhibits no nystagmus. Right pupil is round and reactive. Left pupil is round and reactive.       Restricted on left but normal on right   Neck: Normal range of motion. Neck supple. No thyromegaly present.   Cardiovascular: Regular rhythm and normal heart sounds.    No murmur heard.  Pulmonary/Chest: Effort normal and breath sounds normal.   Abdominal: Soft. Bowel sounds are normal.   Neurological: She is alert and oriented to person, place, and time.   More awake than before.    Skin: She is not diaphoretic.   Psychiatric: Affect normal.   Vitals reviewed.        Lab Data Review:      1/4/2017  5:49 PM    Recent Labs      01/05/17   0255  01/06/17   0150  01/07/17   0159   SODIUM  133*  136  139   POTASSIUM  3.6  3.1*  3.8   CHLORIDE  102  104  107   CO2  22  24  23   BUN  6*  5*  5*   CREATININE  0.45*  0.35*  0.43*   MAGNESIUM  1.6  1.8  1.8   CALCIUM  8.4*  8.2*  8.7       Recent Labs      01/05/17   0255  01/06/17   0150  01/07/17   0159   ALTSGPT  68*  80*  74*    ASTSGOT  91*  53*  46*   ALKPHOSPHAT  83  84  101*   TBILIRUBIN  0.4  0.2  0.2   GLUCOSE  118*  110*  93       Recent Labs      01/05/17   0255  01/06/17   0150  01/07/17   0159   RBC  4.20  4.01*  4.37   HEMOGLOBIN  11.6*  10.9*  12.1   HEMATOCRIT  36.2*  33.9*  37.4   PLATELETCT  188  187  256       Recent Labs      01/05/17   0255  01/06/17   0150  01/07/17   0159   WBC  5.7  3.9*  4.0*   NEUTSPOLYS  74.60*  66.10  46.40   LYMPHOCYTES  9.60*  23.50  40.20   MONOCYTES  2.60  2.60  6.30   EOSINOPHILS  0.90  0.00  7.10*   BASOPHILS  0.00  0.90  0.00   ASTSGOT  91*  53*  46*   ALTSGPT  68*  80*  74*   ALKPHOSPHAT  83  84  101*   TBILIRUBIN  0.4  0.2  0.2           Assessment/Plan     Bacteremia due to Staphylococcus  Assessment & Plan  Blood and wound CX positive for MRSA  - ?from infected wound; pt denies IV drug use, UDS + for stimulants  - continue vancomycin for now; will need for around 2 weeks per ID recs  - ECHO ordered; pending  - ctm     Cellulitis of left orbit  Assessment & Plan  Assessment:  -Left upper face swelling/erythema involving upper eyelid and eye  - On admission: acute onset x 2 days after pimple appeared on the left side of forehead; progressively involved the left eyelid with swelling and unable to open the eye    - on admission: grossly intact extra-ocular muscles movement; with normal vision 20/23 with hand-held snellen chart    - on admission: WBC count: 17.6, VS: /76, pulse 83, temp 36.5 °C (97.7 °F), SpO2 100 %. RA   - tonometry in ED: 12 RE, 24 LE  - CT orbits: orbital cellulitis  - 1/4/17: worsening swelling of eyelid; EOM restricted (partly due to worsening chemosis), Vision intact 20/30, light reflex normal; ophthalmology on board; erythema has reduced and regressed from the initial marking; also purulent substance from the pustule, sent for culture.    - Ophthalmology following closely: started patient on tobramycin-dexamethasone drops q2hrs  - MR head w/wo: Impression: mild  orbital and periorbital cellulitis, mild proptosis, no abscess, no evidence of cavernous sinus thrombosis.   - continued on vancomycin and d/c ceftriaxone given staph aureus; MRSA + in both wound and blood  - 1/6: significant improvement in the left eye, able to open more, swelling significantly decreased; chemosis decreased.  > PLAN:   - continue Vancomycin per ID recs  - pending EKG, ECHO  -  repeat BCx ordered: positive for MRSA, will repeat in AM (1/8/17)  - regular eye exam; update ophthalmology with any changes.   - tylenol prn for pain and fever.  - ctm         Hyperthyroidism  Assessment & Plan  - TSH: 0.280  - Free T-4: 2.12  - no physical symptoms concerning hyperthyroidism; except HR >100, however, patient has fever with infection   - will need further workup once acute complaint is more stable.   - consider TSI/ US     Elevated liver enzymes  Assessment & Plan  - 1/6 liver enzymes: AST: 53  ALT: 80 -->AST: 46, ALT: 74; trending down  - ?possibly from Ceftriaxone  - will monitor; pt asymptomatic    Anemia  Assessment & Plan  Hb: on admission 14.1; 1/6/17: 10.9,  -MCV: 90-->84 (?iron losses); Hb: 12.1(1/7)--Improving  - pt reported heavy menstrual cycle this time  - also has active infection; likely inflammatory process causing drop in Hb  - will monitor; and if drop further; will consider full anemia workup

## 2017-01-07 NOTE — PROGRESS NOTES
"                               [facility] Internal Medicine Interval Note    Name Rola Carrillo 1985   Age/Sex 31 y.o. female   MRN [id number]   Code Status FULL     After 5PM or if no immediate response to page, please call for cross-coverage  Attending/Team: Blue/Adan Call [phone] to page   1st Call - Day Intern (R1):   Sara 2nd Call - Day Sr. Resident (R2/R3):   Latt         Chief complaint/ reason for interval visit (Primary Diagnosis)   Orbital celullitis and facial celullitis    Interval Problem Daily Status Update    Active Hospital Problems    Diagnosis   • Bacteremia due to Staphylococcus [R78.81]   • Elevated liver enzymes [R74.8]   • Hyperthyroidism [E05.90]   • Cellulitis of left orbit [H05.012]       Review of Systems   Constitutional: Negative for fever, chills, malaise/fatigue and diaphoresis.   HENT:        Patient endorses headache last night but states it was a \"usual headache\"   Eyes: Negative for blurred vision, double vision, photophobia, pain and discharge.        Greatly improved symptoms from yesterday, decreased pain. Would like swabs/wipes to clear crusting over L eye lid   Respiratory: Negative for cough, sputum production, shortness of breath and wheezing.    Cardiovascular: Negative for chest pain and orthopnea.   Gastrointestinal: Negative for nausea, vomiting, abdominal pain, diarrhea and constipation.   Genitourinary: Negative for dysuria, urgency and frequency.   Musculoskeletal: Negative for myalgias and falls.   Neurological: Negative for weakness and headaches.       Consultants/Specialty  Opthalmology  Infectious Disease      Disposition  Hospital    Quality Measures  Labs reviewed and Medications reviewed  Castellanos catheter: No Castellanos      DVT Prophylaxis: Enoxaparin (Lovenox)      Antibiotics: Treating active infection/contamination beyond 24 hours perioperative coverage            Physical Exam       Filed Vitals:    17 0800 17 1600 17 " 01/07/17 0400   BP: 120/75 112/67 113/68 107/67   Pulse: 71 67 82 75   Temp: 37.2 °C (98.9 °F) 37.1 °C (98.7 °F) 37.4 °C (99.4 °F) 36.6 °C (97.8 °F)   TempSrc:       Resp: 16 16 18 16   Height:       Weight:       SpO2: 98% 97% 100% 98%     Body mass index is 27.78 kg/(m^2).    Oxygen Therapy:  Pulse Oximetry: 98 %, O2 (LPM): 0, O2 Delivery: None (Room Air)    Physical Exam   Constitutional: She is oriented to person, place, and time and well-developed, well-nourished, and in no distress. No distress.   HENT:   Head: Normocephalic and atraumatic.   Eyes: EOM are normal. Pupils are equal, round, and reactive to light. Right eye exhibits no discharge. Left eye exhibits no discharge.   R eye nml; L eye PERRL, and improved EOM in all directions.  Decreased erythema and chemosis, and decreased conjunctival injection of the left eye.    Cardiovascular: Normal rate, regular rhythm and normal heart sounds.  Exam reveals no gallop and no friction rub.    No murmur heard.  Pulmonary/Chest: Effort normal and breath sounds normal.   Musculoskeletal: She exhibits no edema or tenderness.   Neurological: She is alert and oriented to person, place, and time. GCS score is 15.   Skin: She is not diaphoretic.         Lab Data Review:      1/6/2017  1:57 PM    Recent Labs      01/05/17 0255 01/06/17 0150 01/07/17 0159   SODIUM  133*  136  139   POTASSIUM  3.6  3.1*  3.8   CHLORIDE  102  104  107   CO2  22  24  23   BUN  6*  5*  5*   CREATININE  0.45*  0.35*  0.43*   MAGNESIUM  1.6  1.8  1.8   CALCIUM  8.4*  8.2*  8.7       Recent Labs      01/05/17 0255 01/06/17 0150 01/07/17   0159   ALTSGPT  68*  80*  74*   ASTSGOT  91*  53*  46*   ALKPHOSPHAT  83  84  101*   TBILIRUBIN  0.4  0.2  0.2   GLUCOSE  118*  110*  93       Recent Labs      01/05/17 0255 01/06/17   0150  01/07/17   0159   RBC  4.20  4.01*  4.37   HEMOGLOBIN  11.6*  10.9*  12.1   HEMATOCRIT  36.2*  33.9*  37.4   PLATELETCT  188  187  256       Recent Labs       01/05/17   0255  01/06/17   0150  01/07/17   0159   WBC  5.7  3.9*  4.0*   NEUTSPOLYS  74.60*  66.10  46.40   LYMPHOCYTES  9.60*  23.50  40.20   MONOCYTES  2.60  2.60  6.30   EOSINOPHILS  0.90  0.00  7.10*   BASOPHILS  0.00  0.90  0.00   ASTSGOT  91*  53*  46*   ALTSGPT  68*  80*  74*   ALKPHOSPHAT  83  84  101*   TBILIRUBIN  0.4  0.2  0.2           Assessment/Plan     Cellulitis of left orbit  Assessment & Plan  Assessment:  -Left upper face swelling/erythema involving upper eyelid and eye  - On admission: acute onset x 2 days after pimple appeared on the left side of forehead; progressively involved the left eyelid with swelling and unable to open the eye    - on admission: grossly intact extra-ocular muscles movement; with normal vision 20/23 with hand-held snellen chart    - on admission: WBC count: 17.6, VS: /76, pulse 83, temp 36.5 °C (97.7 °F), SpO2 100 %. RA   - tonometry in ED: 12 RE, 24 LE  - CT orbits: orbital cellulitis  - 1/4/17: worsening swelling of eyelid; EOM restricted (partly due to worsening chemosis), Vision intact 20/30, light reflex normal; ophthalmology on board; erythema has reduced and regressed from the initial marking; also purulent substance from the pustule, sent for culture.   -  1/3 & 1/4: BCx (+) for staph aureus     - since admission 1/3: C3 and Vancomycin;   - Ophthalmology following closely: started patient on tobramycin-dexamethasone drops q2hrs  - MR head w/wo: Impression: mild orbital and periorbital cellulitis, mild proptosis, no abscess, no evidence of cavernous sinus thrombosis.   > PLAN:   - 1/5: discontinue ceftriaxone & continue IV vancomycin per ID Dr Hilliard (appreciate recommendation)  - pending EKG, ECHO & repeat BCx  1/7: patient much improved, Per ID/Babak: if neg place PICC line and cont. IV vanc for 14 days with day 1 being Jan 5.  Blood cultures are still positive as of Jan 7 1100, order and await repeat cultures.  Consider facial wipes for eye crusting.  "Thank you for your recommendation   - regular eye exam; update ophthalmology with any changes.   - tylenol prn for pain and fever.  - ctm       Hyperthyroidism  Assessment & Plan  - TSH: 0.280  - Free T-4: 2.12  - no physical symptoms concerning hyperthyroidism  - will need further workup once acute complaint is more stable.   - consider TSI/ US     Bacteremia  - had two separate gram stains and cultures: one of her forehead lesion (\"pimple\") and the other of her blood. The wound culture showed heavy growth MRSA and the blood culture showed gram positive cocci possible S aureus.   -Plan:  Stop ceftraixone now and continue vancomycin unless blood cultures are confirmed as otherwise (e.g, MSSA, then switch to cefazolin). Repeat blood cultures 1/7/17 and f/u to ensure resolution of bacteremia.   -If bacteremia does not resolve, then perform LUZ per ID consult   -1/7/17 blood cultures are still positive with MRSA, considering ordering a TTE to see if there is a nidus of infection from the heart  -start PICC line with IV vanc for 14 days starting on day 1 of first negative blood culture.   --  Transaminitis  -1/6/17 shows elevated liver labs (AST, ALT)  -IV fluids as needed  -ceftriaxone d/c   -ctm  "

## 2017-01-07 NOTE — PROGRESS NOTES
31 year old female seen in f/u for MRSA facial cellulitis and bacteremia. Would like to have her   Young children to be able to visit, the kids are very upset about her not being home.  Overall doing better, now able to open eye,and face is less swollen.  No fever    Meds;   Presently on iv vancomycin.     ROS- no other complaints except related to her eye    PE- afebrile  Able to open left eye  Facial swelling decreased, the forehead abscess is also imrpoved  Redness resolving  Card- no murmur noted  Skin- no rash     Labs:  blood culture-  Jan 5- no growth so far    METHICILLIN RESISTANT STAPHYLOCOCCUS AUREUS      Antibiotic Sensitivity Microscan Unit Status     Ampicillin/sulbactam Resistant <=8/4 mcg/mL Final     Clindamycin Sensitive <=0.5 mcg/mL Final     Daptomycin Sensitive <=0.5 mcg/mL Final     Erythromycin Resistant >4 mcg/mL Final     Moxifloxacin Sensitive 1 mcg/mL Final     Oxacillin Resistant >2 mcg/mL Final     Penicillin Resistant >8 mcg/mL Final     Tetracycline Sensitive <=4 mcg/mL Final     Trimeth/Sulfa Sensitive <=0.5/9.5 mcg/mL Final     Vancomycin Sensitive 1 mcg/mL Final             Assess:  MRSA bacteremia- so far repeat blood cultures negative so far. If negative place PICC, would treat for 14 days with vancomycin with day #1 being Jan 5.   I discussed this with her, she agrees to stay but would like her kids to be able to visit

## 2017-01-07 NOTE — PROGRESS NOTES
[facility] [place] [facility] Interval Note    Name Rola Carrillo 1985   Age/Sex 31 y.o. female   MRN [id number]   Code Status Full     After 5PM or if no immediate response to page, please call for cross-coverage  Attending/Team: Douglas/Dr. Apple Call [phone] to page   1st Call - Day Intern (R1):   Dr. Ruiz 2nd Call - Day Sr. Resident (R2/R3):   Dr. Little         Chief complaint/ reason for interval visit (Primary Diagnosis)   Left pre and post septal orbital cellulitis and left forehead    Interval Problem Daily Status Update    - Patient is feeling well today; Left eye improved, swelling decreased.   - no fever today; VS stable  - bacterial and wound culture + for MRSA   - continue vanco   - closely monitor the left eye      Active Hospital Problems    Diagnosis   • Hyperthyroidism [E05.90]   • Cellulitis of left orbit [H05.012]       Review of Systems   Constitutional: Positive for fever. Negative for chills and weight loss.   HENT: Negative for congestion, nosebleeds and sore throat.    Eyes: Positive for pain and redness. Negative for blurred vision, double vision, photophobia and discharge.        Left eyelid able to open better than before  Right eye wnl   Respiratory: Negative.    Cardiovascular: Negative.    Gastrointestinal: Negative.    Genitourinary: Negative.    Musculoskeletal: Negative.    Skin:        Erythema of left upper face and periorbital region has significantly improved.    Neurological: Negative.  Negative for headaches.   Endo/Heme/Allergies: Negative.    Psychiatric/Behavioral: Negative.        Consultants/Specialty  Ophthalmology  Infectious Disease: Dr. Hilliard    Disposition  Inpatient being treated for orbital cellulitis    Quality Measures  Radiology images reviewed, Labs reviewed and Medications reviewed  Castellanos catheter: No Castellanos      DVT Prophylaxis: Enoxaparin (Lovenox)                  Physical Exam       Filed Vitals:    17  0400 01/06/17 0445 01/06/17 0800 01/06/17 1600   BP: 105/57  120/75 112/67   Pulse: 75  71 67   Temp: 35.9 °C (96.7 °F) 36.8 °C (98.2 °F) 37.2 °C (98.9 °F) 37.1 °C (98.7 °F)   TempSrc:       Resp: 16  16 16   Height:       Weight:       SpO2: 92%  98% 97%     Body mass index is 27.78 kg/(m^2).    Oxygen Therapy:  Pulse Oximetry: 97 %, O2 (LPM): 0, O2 Delivery: None (Room Air)    Physical Exam   Constitutional: She is oriented to person, place, and time and well-developed, well-nourished, and in no distress. No distress.   HENT:   Head: Normocephalic and atraumatic.   Eyes: Right eye exhibits no discharge. Left eye exhibits chemosis. Left eye exhibits no discharge. Right conjunctiva is not injected. Right conjunctiva has no hemorrhage. Left conjunctiva is injected. Left conjunctiva has no hemorrhage. No scleral icterus. Right eye exhibits normal extraocular motion and no nystagmus. Left eye exhibits abnormal extraocular motion. Left eye exhibits no nystagmus. Right pupil is round and reactive. Left pupil is round and reactive.       Restricted on left but normal on right   Neck: Normal range of motion. Neck supple. No thyromegaly present.   Cardiovascular: Regular rhythm and normal heart sounds.    No murmur heard.  Pulmonary/Chest: Effort normal and breath sounds normal.   Abdominal: Soft. Bowel sounds are normal.   Neurological: She is alert and oriented to person, place, and time.   somnolent   Skin: She is not diaphoretic.   Psychiatric: Affect normal.   Vitals reviewed.        Lab Data Review:      1/4/2017  5:49 PM    Recent Labs      01/04/17   0208  01/05/17   0255  01/06/17   0150   SODIUM  131*  133*  136   POTASSIUM  3.5*  3.6  3.1*   CHLORIDE  101  102  104   CO2  21  22  24   BUN  5*  6*  5*   CREATININE  0.55  0.45*  0.35*   MAGNESIUM   --   1.6  1.8   CALCIUM  8.6  8.4*  8.2*       Recent Labs      01/04/17   0208  01/05/17   0255  01/06/17   0150   ALTSGPT  9  68*  80*   ASTSGOT  9*  91*  53*    ALKPHOSPHAT  65  83  84   TBILIRUBIN  0.6  0.4  0.2   GLUCOSE  109*  118*  110*       Recent Labs      01/04/17   0208  01/05/17   0255  01/06/17   0150   RBC  4.32  4.20  4.01*   HEMOGLOBIN  12.2  11.6*  10.9*   HEMATOCRIT  37.3  36.2*  33.9*   PLATELETCT  234  188  187       Recent Labs      01/04/17   0208  01/05/17   0255  01/06/17   0150   WBC  14.4*  5.7  3.9*   NEUTSPOLYS  92.40*  74.60*  66.10   LYMPHOCYTES  3.30*  9.60*  23.50   MONOCYTES  3.30  2.60  2.60   EOSINOPHILS  0.10  0.90  0.00   BASOPHILS  0.20  0.00  0.90   ASTSGOT  9*  91*  53*   ALTSGPT  9  68*  80*   ALKPHOSPHAT  65  83  84   TBILIRUBIN  0.6  0.4  0.2           Assessment/Plan     Bacteremia due to Staphylococcus  Assessment & Plan  Blood and wound CX positive for MRSA  - ?from infected wound; pt denies IV drug use, UDS + for stimulants  - continue vancomycin for now; will need for around 2 weeks per ID recs  - ECHO ordered; pending  - ctm     Cellulitis of left orbit  Assessment & Plan  Assessment:  -Left upper face swelling/erythema involving upper eyelid and eye  - On admission: acute onset x 2 days after pimple appeared on the left side of forehead; progressively involved the left eyelid with swelling and unable to open the eye    - on admission: grossly intact extra-ocular muscles movement; with normal vision 20/23 with hand-held snellen chart    - on admission: WBC count: 17.6, VS: /76, pulse 83, temp 36.5 °C (97.7 °F), SpO2 100 %. RA   - tonometry in ED: 12 RE, 24 LE  - CT orbits: orbital cellulitis  - 1/4/17: worsening swelling of eyelid; EOM restricted (partly due to worsening chemosis), Vision intact 20/30, light reflex normal; ophthalmology on board; erythema has reduced and regressed from the initial marking; also purulent substance from the pustule, sent for culture.    - Ophthalmology following closely: started patient on tobramycin-dexamethasone drops q2hrs  - MR head w/wo: Impression: mild orbital and periorbital  cellulitis, mild proptosis, no abscess, no evidence of cavernous sinus thrombosis.   - continued on vancomycin and d/c ceftriaxone given staph aureus; MRSA + in both wound and blood  - 1/6: significant improvement in the left eye, able to open more, swelling significantly decreased; chemosis decreased.  > PLAN:   - continue Vancomycin per ID recs  - pending EKG, ECHO  -  repeat BCx ordered  - regular eye exam; update ophthalmology with any changes.   - tylenol prn for pain and fever.  - ctm         Hyperthyroidism  Assessment & Plan  - TSH: 0.280  - Free T-4: 2.12  - no physical symptoms concerning hyperthyroidism; except HR >100, however, patient has fever with infection   - will need further workup once acute complaint is more stable.   - consider TSI/ US     Elevated liver enzymes  Assessment & Plan  - 1/6 liver enzymes: AST: 53  ALT: 80; mild elevation  -possibly from Ceftriaxone  - will monitor; pt asymptomatic    Anemia  Assessment & Plan  Hb: on admission 14.1; 1/6/17: 10.9,  -MCV: 90-->84 (?iron losses)  - pt reported heavy menstrual cycle this time  - also has active infection; likely inflammatory process causing drop in Hb  - will monitor; and if drop further; will consider full anemia workup

## 2017-01-07 NOTE — PROGRESS NOTES
"Pharmacy Kinetics 31 y.o. female on vancomycin day # 5 2017    Currently on Vancomycin 1200 mg iv q8hr (0000, 0800, 1600)    Indication for Treatment: facial cellulitis    Pertinent history per medical record: Admitted on 1/3/2017 for facial cellulitis.  Patient with left forehead swelling above eye.  She thought it was a pimple, but today is much larger and painful.  CT orbits with possible cellulitis.  Pt seen by ID today and plan for IV vancomycin x 14 days from negative cultures.   is being considered day 1 so therapy would be thru .     Other antibiotics: none    Allergies: Amoxicillin and Penicillins     List concerns for renal function: transaminitis    Pertinent cultures to date:   17: blood (peripheral) NEG x2  17: wound (head) POS - methicillin resistant staphylococcus aureus, heavy growth  1/3/17: blood (peripheral) POS - gram positive cocci, possible staphylococcus sp. x2    Recent Labs      17   0255  17   0150  17   0159   WBC  5.7  3.9*  4.0*   NEUTSPOLYS  74.60*  66.10  46.40   BANDSSTABS  11.40*  6.90   --      Recent Labs      17   0255  17   0150  17   0159   BUN  6*  5*  5*   CREATININE  0.45*  0.35*  0.43*   ALBUMIN  3.2  3.0*  3.1*     Recent Labs      17   1607  17   0711   VANCOTROUGH  5.9*  10.9     Intake/Output Summary (Last 24 hours) at 17 1442  Last data filed at 17 0700   Gross per 24 hour   Intake   1845 ml   Output      0 ml   Net   1845 ml      Blood pressure 120/69, pulse 69, temperature 36.6 °C (97.9 °F), temperature source Temporal, resp. rate 18, height 1.575 m (5' 2.01\"), weight 68.9 kg (151 lb 14.4 oz), last menstrual period 2011, SpO2 98 %. Temp (24hrs), Av.9 °C (98.5 °F), Min:36.6 °C (97.8 °F), Max:37.4 °C (99.4 °F)      A/P   1. Vancomycin dose change: not indicated  2. Next vancomycin level: 1-2 days  3. Goal trough: 12-16 mcg/ml  4. Comments: no changes in renal function.  Will " plan for a level in the next couple of days.  Pharmacy will continue to monitor and will adjust dosing if needed.      Sonia Gonzalez, PharmD

## 2017-01-07 NOTE — ASSESSMENT & PLAN NOTE
- 1/6 liver enzymes: AST: 53--->90 high,  ALT: 80 -->205 high, Alk phosph: 84-->112 high  -hep B Ag: -ve  , not alcoholic   -US abdomen/liver ordered   - will monitor; pt asymptomatic

## 2017-01-07 NOTE — PROGRESS NOTES
Pt is AOx4, no complains of pain, tolerated oral medications, refused her Lovenox and Colace, lefy eye still edematous with some redness but pt is able to open her left eye slightly, reports blurry vision on her left eye, up to BR SBA, steady gait, skin and sacral assessment done, treaded socks on, call light in use, instructed to call for assistance, plan of care discussed, all needs met at this time.

## 2017-01-07 NOTE — ASSESSMENT & PLAN NOTE
Blood and wound CX positive for MRSA  - ?from infected wound; pt denies IV drug use, UDS + for stimulants  - continue vancomycin for now; will need for around 2 weeks from day culture negative (1/5) per ID recs  - ECHO: normal; EF 65%, no valvular vegetations  - BCx (-) 1/16 -> ABx 2 weeks -> end date 1/19   - ctm

## 2017-01-08 LAB
ANION GAP SERPL CALC-SCNC: 9 MMOL/L (ref 0–11.9)
BASOPHILS # BLD AUTO: 0.9 % (ref 0–1.8)
BASOPHILS # BLD: 0.04 K/UL (ref 0–0.12)
BUN SERPL-MCNC: 8 MG/DL (ref 8–22)
CALCIUM SERPL-MCNC: 9.2 MG/DL (ref 8.5–10.5)
CHLORIDE SERPL-SCNC: 103 MMOL/L (ref 96–112)
CO2 SERPL-SCNC: 25 MMOL/L (ref 20–33)
CREAT SERPL-MCNC: 0.43 MG/DL (ref 0.5–1.4)
EOSINOPHIL # BLD AUTO: 0.08 K/UL (ref 0–0.51)
EOSINOPHIL NFR BLD: 1.7 % (ref 0–6.9)
ERYTHROCYTE [DISTWIDTH] IN BLOOD BY AUTOMATED COUNT: 41.6 FL (ref 35.9–50)
GFR SERPL CREATININE-BSD FRML MDRD: >60 ML/MIN/1.73 M 2
GLUCOSE SERPL-MCNC: 99 MG/DL (ref 65–99)
HCT VFR BLD AUTO: 36.2 % (ref 37–47)
HGB BLD-MCNC: 11.6 G/DL (ref 12–16)
LV EJECT FRACT  99904: 65
LV EJECT FRACT MOD 2C 99903: 71.27
LV EJECT FRACT MOD 4C 99902: 67.61
LV EJECT FRACT MOD BP 99901: 67.51
LYMPHOCYTES # BLD AUTO: 1.81 K/UL (ref 1–4.8)
LYMPHOCYTES NFR BLD: 40.3 % (ref 22–41)
MAGNESIUM SERPL-MCNC: 1.8 MG/DL (ref 1.5–2.5)
MANUAL DIFF BLD: NORMAL
MCH RBC QN AUTO: 27.2 PG (ref 27–33)
MCHC RBC AUTO-ENTMCNC: 32 G/DL (ref 33.6–35)
MCV RBC AUTO: 84.8 FL (ref 81.4–97.8)
MONOCYTES # BLD AUTO: 0.24 K/UL (ref 0–0.85)
MONOCYTES NFR BLD AUTO: 5.3 % (ref 0–13.4)
MORPHOLOGY BLD-IMP: NORMAL
NEUTROPHILS # BLD AUTO: 2.33 K/UL (ref 2–7.15)
NEUTROPHILS NFR BLD: 51.8 % (ref 44–72)
NRBC # BLD AUTO: 0 K/UL
NRBC BLD AUTO-RTO: 0 /100 WBC
PLATELET # BLD AUTO: 295 K/UL (ref 164–446)
PLATELET BLD QL SMEAR: NORMAL
PMV BLD AUTO: 9.2 FL (ref 9–12.9)
POTASSIUM SERPL-SCNC: 3.7 MMOL/L (ref 3.6–5.5)
RBC # BLD AUTO: 4.27 M/UL (ref 4.2–5.4)
RBC BLD AUTO: NORMAL
SODIUM SERPL-SCNC: 137 MMOL/L (ref 135–145)
WBC # BLD AUTO: 4.5 K/UL (ref 4.8–10.8)

## 2017-01-08 PROCEDURE — A9270 NON-COVERED ITEM OR SERVICE: HCPCS | Performed by: HOSPITALIST

## 2017-01-08 PROCEDURE — 85027 COMPLETE CBC AUTOMATED: CPT

## 2017-01-08 PROCEDURE — 99232 SBSQ HOSP IP/OBS MODERATE 35: CPT | Mod: GC | Performed by: INTERNAL MEDICINE

## 2017-01-08 PROCEDURE — 83735 ASSAY OF MAGNESIUM: CPT

## 2017-01-08 PROCEDURE — A9270 NON-COVERED ITEM OR SERVICE: HCPCS | Performed by: INTERNAL MEDICINE

## 2017-01-08 PROCEDURE — 700105 HCHG RX REV CODE 258

## 2017-01-08 PROCEDURE — 80048 BASIC METABOLIC PNL TOTAL CA: CPT

## 2017-01-08 PROCEDURE — 700102 HCHG RX REV CODE 250 W/ 637 OVERRIDE(OP): Performed by: INTERNAL MEDICINE

## 2017-01-08 PROCEDURE — 770021 HCHG ROOM/CARE - ISO PRIVATE

## 2017-01-08 PROCEDURE — 93306 TTE W/DOPPLER COMPLETE: CPT

## 2017-01-08 PROCEDURE — 700102 HCHG RX REV CODE 250 W/ 637 OVERRIDE(OP): Performed by: HOSPITALIST

## 2017-01-08 PROCEDURE — 87040 BLOOD CULTURE FOR BACTERIA: CPT

## 2017-01-08 PROCEDURE — 700111 HCHG RX REV CODE 636 W/ 250 OVERRIDE (IP): Performed by: HOSPITALIST

## 2017-01-08 PROCEDURE — 93306 TTE W/DOPPLER COMPLETE: CPT | Mod: 26 | Performed by: INTERNAL MEDICINE

## 2017-01-08 PROCEDURE — 85007 BL SMEAR W/DIFF WBC COUNT: CPT

## 2017-01-08 PROCEDURE — 700111 HCHG RX REV CODE 636 W/ 250 OVERRIDE (IP)

## 2017-01-08 PROCEDURE — 700112 HCHG RX REV CODE 229: Performed by: HOSPITALIST

## 2017-01-08 PROCEDURE — 36415 COLL VENOUS BLD VENIPUNCTURE: CPT

## 2017-01-08 RX ORDER — MAGNESIUM SULFATE HEPTAHYDRATE 40 MG/ML
2 INJECTION, SOLUTION INTRAVENOUS ONCE
Status: COMPLETED | OUTPATIENT
Start: 2017-01-08 | End: 2017-01-08

## 2017-01-08 RX ORDER — POTASSIUM CHLORIDE 20 MEQ/1
40 TABLET, EXTENDED RELEASE ORAL ONCE
Status: COMPLETED | OUTPATIENT
Start: 2017-01-08 | End: 2017-01-08

## 2017-01-08 RX ADMIN — TOBRAMYCIN AND DEXAMETHASONE 1 DROP: 3; 1 SUSPENSION/ DROPS OPHTHALMIC at 14:00

## 2017-01-08 RX ADMIN — TOBRAMYCIN AND DEXAMETHASONE 1 DROP: 3; 1 SUSPENSION/ DROPS OPHTHALMIC at 05:44

## 2017-01-08 RX ADMIN — TOBRAMYCIN AND DEXAMETHASONE 1 DROP: 3; 1 SUSPENSION/ DROPS OPHTHALMIC at 08:34

## 2017-01-08 RX ADMIN — VANCOMYCIN HYDROCHLORIDE 1400 MG: 10 INJECTION, POWDER, LYOPHILIZED, FOR SOLUTION INTRAVENOUS at 16:00

## 2017-01-08 RX ADMIN — Medication 1 TABLET: at 21:02

## 2017-01-08 RX ADMIN — TOBRAMYCIN AND DEXAMETHASONE 1 DROP: 3; 1 SUSPENSION/ DROPS OPHTHALMIC at 21:08

## 2017-01-08 RX ADMIN — Medication 100 MG: at 08:31

## 2017-01-08 RX ADMIN — ACETAMINOPHEN 650 MG: 325 TABLET, FILM COATED ORAL at 08:31

## 2017-01-08 RX ADMIN — VANCOMYCIN HYDROCHLORIDE 1400 MG: 10 INJECTION, POWDER, LYOPHILIZED, FOR SOLUTION INTRAVENOUS at 00:40

## 2017-01-08 RX ADMIN — TOBRAMYCIN AND DEXAMETHASONE 1 DROP: 3; 1 SUSPENSION/ DROPS OPHTHALMIC at 18:00

## 2017-01-08 RX ADMIN — POTASSIUM CHLORIDE 40 MEQ: 1500 TABLET, EXTENDED RELEASE ORAL at 07:25

## 2017-01-08 RX ADMIN — MAGNESIUM SULFATE IN WATER 2 G: 40 INJECTION, SOLUTION INTRAVENOUS at 07:26

## 2017-01-08 RX ADMIN — TOBRAMYCIN AND DEXAMETHASONE 1 DROP: 3; 1 SUSPENSION/ DROPS OPHTHALMIC at 16:00

## 2017-01-08 RX ADMIN — VANCOMYCIN HYDROCHLORIDE 1400 MG: 10 INJECTION, POWDER, LYOPHILIZED, FOR SOLUTION INTRAVENOUS at 08:31

## 2017-01-08 RX ADMIN — VANCOMYCIN HYDROCHLORIDE 1400 MG: 10 INJECTION, POWDER, LYOPHILIZED, FOR SOLUTION INTRAVENOUS at 23:45

## 2017-01-08 RX ADMIN — TOBRAMYCIN AND DEXAMETHASONE 1 DROP: 3; 1 SUSPENSION/ DROPS OPHTHALMIC at 12:00

## 2017-01-08 ASSESSMENT — ENCOUNTER SYMPTOMS
PHOTOPHOBIA: 0
DOUBLE VISION: 0
RESPIRATORY NEGATIVE: 1
EYE PAIN: 1
EYE DISCHARGE: 0
NEUROLOGICAL NEGATIVE: 1
BLURRED VISION: 0
SORE THROAT: 0
EYE REDNESS: 1
GASTROINTESTINAL NEGATIVE: 1
FEVER: 1
CHILLS: 0
MUSCULOSKELETAL NEGATIVE: 1
HEADACHES: 0
CARDIOVASCULAR NEGATIVE: 1
PSYCHIATRIC NEGATIVE: 1
WEIGHT LOSS: 0

## 2017-01-08 ASSESSMENT — PAIN SCALES - GENERAL
PAINLEVEL_OUTOF10: 6
PAINLEVEL_OUTOF10: 0

## 2017-01-08 NOTE — PROGRESS NOTES
[facility] [place] Internal Medicine Interval Note    Name Rola Carrillo 1985   Age/Sex 31 y.o. female   MRN [id number]   Code Status Full     After 5PM or if no immediate response to page, please call for cross-coverage  Attending/Team: Douglas/Dr. Apple Call [phone] to page   1st Call - Day Intern (R1):   Dr. Ruiz 2nd Call - Day Sr. Resident (R2/R3):   Dr. Little         Chief complaint/ reason for interval visit (Primary Diagnosis)   Left pre and post septal orbital cellulitis and left forehead    Interval Problem Daily Status Update    - Patient feeling good; Left eye improved, swelling decreased.   - no fever today; VS stable  - repeat blood cultures negative for MRSA so far; if remains negative, will order PICC placement in AM  - continue vanco   - closely monitor the left eye      Active Hospital Problems    Diagnosis   • Hyperthyroidism [E05.90]   • Cellulitis of left orbit [H05.012]       Review of Systems   Constitutional: Positive for fever. Negative for chills and weight loss.   HENT: Negative for congestion, nosebleeds and sore throat.    Eyes: Positive for pain and redness. Negative for blurred vision, double vision, photophobia and discharge.        Left eyelid able to open better than before  Right eye wnl   Respiratory: Negative.    Cardiovascular: Negative.    Gastrointestinal: Negative.    Genitourinary: Negative.    Musculoskeletal: Negative.    Skin:        Erythema of left upper face and periorbital region has significantly improved.    Neurological: Negative.  Negative for headaches.   Endo/Heme/Allergies: Negative.    Psychiatric/Behavioral: Negative.        Consultants/Specialty  Ophthalmology  Infectious Disease: Dr. Hilliard    Disposition  Inpatient being treated for orbital cellulitis    Quality Measures  Radiology images reviewed, Labs reviewed and Medications reviewed  Castellanos catheter: No Castellanos      DVT Prophylaxis: Enoxaparin  (Lovenox)                  Physical Exam       Filed Vitals:    01/07/17 1600 01/07/17 2000 01/08/17 0400 01/08/17 0800   BP: 116/74 121/76 112/69 108/72   Pulse: 81 98 60 71   Temp: 36.6 °C (97.9 °F) 37 °C (98.6 °F) 36 °C (96.8 °F) 36.4 °C (97.6 °F)   TempSrc:       Resp: 16 18 18 18   Height:       Weight:       SpO2: 97% 99% 94% 96%     Body mass index is 27.78 kg/(m^2).    Oxygen Therapy:  Pulse Oximetry: 96 %, O2 (LPM): 0, O2 Delivery: None (Room Air)    Physical Exam   Constitutional: She is oriented to person, place, and time and well-developed, well-nourished, and in no distress. No distress.   HENT:   Head: Normocephalic and atraumatic.   Eyes: Right eye exhibits no discharge. Left eye exhibits chemosis. Left eye exhibits no discharge. Right conjunctiva is not injected. Right conjunctiva has no hemorrhage. Left conjunctiva is injected. Left conjunctiva has no hemorrhage. No scleral icterus. Right eye exhibits normal extraocular motion and no nystagmus. Left eye exhibits abnormal extraocular motion. Left eye exhibits no nystagmus. Right pupil is round and reactive. Left pupil is round and reactive.       Restricted on left but normal on right   Neck: Normal range of motion. Neck supple. No thyromegaly present.   Cardiovascular: Regular rhythm and normal heart sounds.    No murmur heard.  Pulmonary/Chest: Effort normal and breath sounds normal.   Abdominal: Soft. Bowel sounds are normal.   Neurological: She is alert and oriented to person, place, and time.   More awake than before.    Skin: She is not diaphoretic.   Psychiatric: Affect normal.   Vitals reviewed.        Lab Data Review:      1/4/2017  5:49 PM    Recent Labs      01/06/17   0150  01/07/17   0159  01/08/17   0348   SODIUM  136  139  137   POTASSIUM  3.1*  3.8  3.7   CHLORIDE  104  107  103   CO2  24  23  25   BUN  5*  5*  8   CREATININE  0.35*  0.43*  0.43*   MAGNESIUM  1.8  1.8  1.8   CALCIUM  8.2*  8.7  9.2       Recent Labs      01/06/17    0150  01/07/17   0159  01/08/17   0348   ALTSGPT  80*  74*   --    ASTSGOT  53*  46*   --    ALKPHOSPHAT  84  101*   --    TBILIRUBIN  0.2  0.2   --    GLUCOSE  110*  93  99       Recent Labs      01/06/17   0150  01/07/17   0159  01/08/17   0348   RBC  4.01*  4.37  4.27   HEMOGLOBIN  10.9*  12.1  11.6*   HEMATOCRIT  33.9*  37.4  36.2*   PLATELETCT  187  256  295       Recent Labs      01/06/17   0150  01/07/17   0159  01/08/17   0348   WBC  3.9*  4.0*  4.5*   NEUTSPOLYS  66.10  46.40  51.80   LYMPHOCYTES  23.50  40.20  40.30   MONOCYTES  2.60  6.30  5.30   EOSINOPHILS  0.00  7.10*  1.70   BASOPHILS  0.90  0.00  0.90   ASTSGOT  53*  46*   --    ALTSGPT  80*  74*   --    ALKPHOSPHAT  84  101*   --    TBILIRUBIN  0.2  0.2   --            Assessment/Plan     Bacteremia due to Staphylococcus  Assessment & Plan  Blood and wound CX positive for MRSA  - ?from infected wound; pt denies IV drug use, UDS + for stimulants  - continue vancomycin for now; will need for around 2 weeks from day culture negative (1/5) per ID recs  - ECHO: normal; EF 65%, no valvular vegetations  - ctm     Cellulitis of left orbit  Assessment & Plan  Assessment:  -Left upper face swelling/erythema involving upper eyelid and eye  - On admission: acute onset x 2 days after pimple appeared on the left side of forehead; progressively involved the left eyelid with swelling and unable to open the eye    - on admission: grossly intact extra-ocular muscles movement; with normal vision 20/23 with hand-held snellen chart    - on admission: WBC count: 17.6, VS: /76, pulse 83, temp 36.5 °C (97.7 °F), SpO2 100 %. RA   - tonometry in ED: 12 RE, 24 LE  - CT orbits: orbital cellulitis  - 1/4/17: worsening swelling of eyelid; EOM restricted (partly due to worsening chemosis), Vision intact 20/30, light reflex normal; ophthalmology on board; erythema has reduced and regressed from the initial marking; also purulent substance from the pustule, sent for culture.     - Ophthalmology following closely: started patient on tobramycin-dexamethasone drops q2hrs  - MR head w/wo: Impression: mild orbital and periorbital cellulitis, mild proptosis, no abscess, no evidence of cavernous sinus thrombosis.   - continued on vancomycin and d/c ceftriaxone given staph aureus; MRSA + in both wound and blood  - 1/8: significant improvement in the left eye, able to open more, swelling significantly decreased; chemosis decreased.  > PLAN:   - continue Vancomycin per ID recs  - ECHO: wnl, EF: 65%, no valvular vegetation  -  repeat BCx 1/5: no growth  - 14 days IV vancomycin from 1/5, will order PICC line in AM  - eye exam improved  - tylenol prn for pain and fever.  - ctm         Hyperthyroidism  Assessment & Plan  - TSH: 0.280  - Free T-4: 2.12  - no physical symptoms concerning hyperthyroidism; except HR >100, however, patient has fever with infection   - will need further workup once acute complaint is more stable.   - consider TSI/ US   - ordered repeat TSH/Free T4    Elevated liver enzymes  Assessment & Plan  - 1/6 liver enzymes: AST: 53  ALT: 80 -->AST: 46, ALT: 74; trending down  - ?possibly from Ceftriaxone  - will monitor; pt asymptomatic    Anemia  Assessment & Plan  Hb: on admission 14.1; 1/6/17: 10.9,  -MCV: 90-->84 (?iron losses); Hb: 12.1(1/7)--Improving  - pt reported heavy menstrual cycle this time  - also has active infection; likely inflammatory process causing drop in Hb  - will monitor; and if drop further; will consider full anemia workup

## 2017-01-08 NOTE — PROGRESS NOTES
Assumed care of pt at shift change, report received from day shift RN. Pt A&Ox4, sitting up in bed. C/o 6/10 facial pain, medicated per MAR. Denies any other s/s of discomfort. No complaints of blurry vision at this time. All needs addressed. Bed in low position, call light within reach, hourly rounding.

## 2017-01-08 NOTE — CARE PLAN
Problem: Infection  Goal: Will remain free from infection  Outcome: PROGRESSING AS EXPECTED  Antibiotic therapy administered per MAR.    Problem: Pain Management  Goal: Pain level will decrease to patient’s comfort goal  Outcome: PROGRESSING AS EXPECTED  Pt medicated for pain per MAR.

## 2017-01-09 LAB
ANION GAP SERPL CALC-SCNC: 8 MMOL/L (ref 0–11.9)
BASOPHILS # BLD AUTO: 0.7 % (ref 0–1.8)
BASOPHILS # BLD: 0.04 K/UL (ref 0–0.12)
BUN SERPL-MCNC: 7 MG/DL (ref 8–22)
CALCIUM SERPL-MCNC: 9.3 MG/DL (ref 8.5–10.5)
CHLORIDE SERPL-SCNC: 105 MMOL/L (ref 96–112)
CO2 SERPL-SCNC: 25 MMOL/L (ref 20–33)
CREAT SERPL-MCNC: 0.39 MG/DL (ref 0.5–1.4)
EOSINOPHIL # BLD AUTO: 0.27 K/UL (ref 0–0.51)
EOSINOPHIL NFR BLD: 4.4 % (ref 0–6.9)
ERYTHROCYTE [DISTWIDTH] IN BLOOD BY AUTOMATED COUNT: 40.5 FL (ref 35.9–50)
FERRITIN SERPL-MCNC: 55.7 NG/ML (ref 10–291)
GFR SERPL CREATININE-BSD FRML MDRD: >60 ML/MIN/1.73 M 2
GLUCOSE SERPL-MCNC: 104 MG/DL (ref 65–99)
HCT VFR BLD AUTO: 36.7 % (ref 37–47)
HGB BLD-MCNC: 11.9 G/DL (ref 12–16)
HGB RETIC QN AUTO: 26.6 PG/CELL (ref 29–35)
IMM GRANULOCYTES # BLD AUTO: 0.03 K/UL (ref 0–0.11)
IMM GRANULOCYTES NFR BLD AUTO: 0.5 % (ref 0–0.9)
IMM RETICS NFR: 14.8 % (ref 9.3–17.4)
INR PPP: 0.98 (ref 0.87–1.13)
IRON SATN MFR SERPL: 8 % (ref 15–55)
IRON SERPL-MCNC: 27 UG/DL (ref 40–170)
LYMPHOCYTES # BLD AUTO: 2.36 K/UL (ref 1–4.8)
LYMPHOCYTES NFR BLD: 38.5 % (ref 22–41)
MAGNESIUM SERPL-MCNC: 1.8 MG/DL (ref 1.5–2.5)
MCH RBC QN AUTO: 27.5 PG (ref 27–33)
MCHC RBC AUTO-ENTMCNC: 32.4 G/DL (ref 33.6–35)
MCV RBC AUTO: 84.8 FL (ref 81.4–97.8)
MONOCYTES # BLD AUTO: 0.53 K/UL (ref 0–0.85)
MONOCYTES NFR BLD AUTO: 8.6 % (ref 0–13.4)
NEUTROPHILS # BLD AUTO: 2.9 K/UL (ref 2–7.15)
NEUTROPHILS NFR BLD: 47.3 % (ref 44–72)
NRBC # BLD AUTO: 0 K/UL
NRBC BLD AUTO-RTO: 0 /100 WBC
PLATELET # BLD AUTO: 354 K/UL (ref 164–446)
PMV BLD AUTO: 9.1 FL (ref 9–12.9)
POTASSIUM SERPL-SCNC: 3.6 MMOL/L (ref 3.6–5.5)
PROTHROMBIN TIME: 13.3 SEC (ref 12–14.6)
RBC # BLD AUTO: 4.33 M/UL (ref 4.2–5.4)
RETICS # AUTO: 0.04 M/UL (ref 0.04–0.06)
RETICS/RBC NFR: 0.9 % (ref 0.8–2.1)
SODIUM SERPL-SCNC: 138 MMOL/L (ref 135–145)
TIBC SERPL-MCNC: 344 UG/DL (ref 250–450)
TSH SERPL DL<=0.005 MIU/L-ACNC: 2.3 UIU/ML (ref 0.3–3.7)
VANCOMYCIN TROUGH SERPL-MCNC: 15.8 UG/ML (ref 10–20)
WBC # BLD AUTO: 6.1 K/UL (ref 4.8–10.8)

## 2017-01-09 PROCEDURE — 83735 ASSAY OF MAGNESIUM: CPT

## 2017-01-09 PROCEDURE — 84443 ASSAY THYROID STIM HORMONE: CPT

## 2017-01-09 PROCEDURE — 82728 ASSAY OF FERRITIN: CPT

## 2017-01-09 PROCEDURE — 36415 COLL VENOUS BLD VENIPUNCTURE: CPT

## 2017-01-09 PROCEDURE — 700111 HCHG RX REV CODE 636 W/ 250 OVERRIDE (IP)

## 2017-01-09 PROCEDURE — 700105 HCHG RX REV CODE 258

## 2017-01-09 PROCEDURE — 700102 HCHG RX REV CODE 250 W/ 637 OVERRIDE(OP): Performed by: HOSPITALIST

## 2017-01-09 PROCEDURE — A9270 NON-COVERED ITEM OR SERVICE: HCPCS | Performed by: INTERNAL MEDICINE

## 2017-01-09 PROCEDURE — A9270 NON-COVERED ITEM OR SERVICE: HCPCS | Performed by: HOSPITALIST

## 2017-01-09 PROCEDURE — 700102 HCHG RX REV CODE 250 W/ 637 OVERRIDE(OP): Performed by: INTERNAL MEDICINE

## 2017-01-09 PROCEDURE — 85610 PROTHROMBIN TIME: CPT

## 2017-01-09 PROCEDURE — 80048 BASIC METABOLIC PNL TOTAL CA: CPT

## 2017-01-09 PROCEDURE — 83540 ASSAY OF IRON: CPT

## 2017-01-09 PROCEDURE — 85046 RETICYTE/HGB CONCENTRATE: CPT

## 2017-01-09 PROCEDURE — 770021 HCHG ROOM/CARE - ISO PRIVATE

## 2017-01-09 PROCEDURE — A9270 NON-COVERED ITEM OR SERVICE: HCPCS | Performed by: EMERGENCY MEDICINE

## 2017-01-09 PROCEDURE — 99232 SBSQ HOSP IP/OBS MODERATE 35: CPT | Mod: GC | Performed by: INTERNAL MEDICINE

## 2017-01-09 PROCEDURE — 80202 ASSAY OF VANCOMYCIN: CPT

## 2017-01-09 PROCEDURE — 85025 COMPLETE CBC W/AUTO DIFF WBC: CPT

## 2017-01-09 PROCEDURE — 83550 IRON BINDING TEST: CPT

## 2017-01-09 PROCEDURE — 700102 HCHG RX REV CODE 250 W/ 637 OVERRIDE(OP): Performed by: EMERGENCY MEDICINE

## 2017-01-09 RX ORDER — POTASSIUM CHLORIDE 20 MEQ/1
40 TABLET, EXTENDED RELEASE ORAL ONCE
Status: COMPLETED | OUTPATIENT
Start: 2017-01-09 | End: 2017-01-09

## 2017-01-09 RX ORDER — MAGNESIUM SULFATE HEPTAHYDRATE 40 MG/ML
2 INJECTION, SOLUTION INTRAVENOUS ONCE
Status: DISPENSED | OUTPATIENT
Start: 2017-01-09 | End: 2017-01-10

## 2017-01-09 RX ADMIN — HYDROCODONE BITARTRATE AND ACETAMINOPHEN 2 TABLET: 5; 325 TABLET ORAL at 22:21

## 2017-01-09 RX ADMIN — Medication 400 MG: at 22:21

## 2017-01-09 RX ADMIN — HYDROCODONE BITARTRATE AND ACETAMINOPHEN 2 TABLET: 5; 325 TABLET ORAL at 04:33

## 2017-01-09 RX ADMIN — TOBRAMYCIN AND DEXAMETHASONE 1 DROP: 3; 1 SUSPENSION/ DROPS OPHTHALMIC at 12:09

## 2017-01-09 RX ADMIN — VANCOMYCIN HYDROCHLORIDE 1400 MG: 10 INJECTION, POWDER, LYOPHILIZED, FOR SOLUTION INTRAVENOUS at 15:51

## 2017-01-09 RX ADMIN — POTASSIUM CHLORIDE 40 MEQ: 1500 TABLET, EXTENDED RELEASE ORAL at 06:02

## 2017-01-09 RX ADMIN — TOBRAMYCIN AND DEXAMETHASONE 1 DROP: 3; 1 SUSPENSION/ DROPS OPHTHALMIC at 10:09

## 2017-01-09 RX ADMIN — TOBRAMYCIN AND DEXAMETHASONE 1 DROP: 3; 1 SUSPENSION/ DROPS OPHTHALMIC at 20:21

## 2017-01-09 RX ADMIN — TOBRAMYCIN AND DEXAMETHASONE 1 DROP: 3; 1 SUSPENSION/ DROPS OPHTHALMIC at 16:00

## 2017-01-09 RX ADMIN — TOBRAMYCIN AND DEXAMETHASONE 1 DROP: 3; 1 SUSPENSION/ DROPS OPHTHALMIC at 06:02

## 2017-01-09 RX ADMIN — VANCOMYCIN HYDROCHLORIDE 1400 MG: 10 INJECTION, POWDER, LYOPHILIZED, FOR SOLUTION INTRAVENOUS at 07:49

## 2017-01-09 RX ADMIN — TOBRAMYCIN AND DEXAMETHASONE 1 DROP: 3; 1 SUSPENSION/ DROPS OPHTHALMIC at 18:01

## 2017-01-09 RX ADMIN — Medication 100 MG: at 07:44

## 2017-01-09 RX ADMIN — TOBRAMYCIN AND DEXAMETHASONE 1 DROP: 3; 1 SUSPENSION/ DROPS OPHTHALMIC at 14:15

## 2017-01-09 RX ADMIN — Medication 1 TABLET: at 20:21

## 2017-01-09 RX ADMIN — TOBRAMYCIN AND DEXAMETHASONE 1 DROP: 3; 1 SUSPENSION/ DROPS OPHTHALMIC at 07:47

## 2017-01-09 RX ADMIN — TOBRAMYCIN AND DEXAMETHASONE 1 DROP: 3; 1 SUSPENSION/ DROPS OPHTHALMIC at 22:00

## 2017-01-09 ASSESSMENT — ENCOUNTER SYMPTOMS
DIAPHORESIS: 0
SHORTNESS OF BREATH: 0
BLURRED VISION: 0
EYE DISCHARGE: 0
MUSCULOSKELETAL NEGATIVE: 1
ORTHOPNEA: 0
HEMOPTYSIS: 0
RESPIRATORY NEGATIVE: 1
DOUBLE VISION: 0
CHILLS: 0
SPUTUM PRODUCTION: 0
GASTROINTESTINAL NEGATIVE: 1
NEUROLOGICAL NEGATIVE: 1
EYE PAIN: 0
WEIGHT LOSS: 0
SORE THROAT: 0
PHOTOPHOBIA: 0
WHEEZING: 0
FEVER: 0
COUGH: 0
EYE PAIN: 1
PALPITATIONS: 0
PSYCHIATRIC NEGATIVE: 1
CARDIOVASCULAR NEGATIVE: 1
HEADACHES: 0
FEVER: 1
WEAKNESS: 0
EYE REDNESS: 1

## 2017-01-09 ASSESSMENT — PAIN SCALES - GENERAL
PAINLEVEL_OUTOF10: 7
PAINLEVEL_OUTOF10: 7
PAINLEVEL_OUTOF10: 0
PAINLEVEL_OUTOF10: 4
PAINLEVEL_OUTOF10: 7

## 2017-01-09 NOTE — PROGRESS NOTES
[facility] [place] Internal Medicine Interval Note    Name Rola Carrillo 1985   Age/Sex 31 y.o. female   MRN [id number]   Code Status FULL     After 5PM or if no immediate response to page, please call for cross-coverage  Attending/Team: Blue/Adan Call [phone] to page   1st Call - Day Intern (R1):   Sara 2nd Call - Day Sr. Resident (R2/R3):   [place]         Chief complaint/ reason for interval visit (Primary Diagnosis)   Facial cellulitis    Interval Problem Daily Status Update    Active Hospital Problems    Diagnosis   • Bacteremia due to Staphylococcus [R78.81]   • Anemia [D64.9]   • Elevated liver enzymes [R74.8]   • Hyperthyroidism [E05.90]   • Cellulitis of left orbit [H05.012]       Review of Systems   Constitutional: Negative for fever, chills, weight loss, malaise/fatigue and diaphoresis.   Eyes: Negative for blurred vision, double vision, photophobia and pain.        Minimal erythema and chemosis of the L eye, much reduced extraocular soft tissue swelling, opening L eyelid independently about 80% of full normal    Respiratory: Negative for cough, hemoptysis, sputum production, shortness of breath and wheezing.    Cardiovascular: Negative for chest pain, palpitations, orthopnea and leg swelling.   Gastrointestinal: Negative.    Genitourinary: Negative.    Neurological: Negative for weakness.       Consultants/Specialty  Infectious Disease   Opthamology     Disposition  Hospital     Quality Measures    Castellanos catheter: No Castellanos      DVT Prophylaxis: Enoxaparin (Lovenox)  DVT prophylaxis - mechanical: Not indicated at this time, ambulatory    Antibiotics: Treating active infection/contamination beyond 24 hours perioperative coverage            Physical Exam       Filed Vitals:    17 0800 17 1600 17 2000 17 0400   BP: 108/72 134/72 128/70 103/63   Pulse: 71 62 92 70   Temp: 36.4 °C (97.6 °F) 36.7 °C (98.1 °F) 36.2 °C (97.1 °F) 36.4 °C (97.6 °F)    [facility]:       Resp: 18 16 16 16   Height:       Weight:       SpO2: 96% 94% 97% 97%     Body mass index is 27.78 kg/(m^2).    Oxygen Therapy:  Pulse Oximetry: 97 %, O2 (LPM): 0, O2 Delivery: None (Room Air)    Physical Exam   Constitutional: She is oriented to person, place, and time and well-developed, well-nourished, and in no distress. No distress.   HENT:   Head: Normocephalic and atraumatic.   Eyes: EOM are normal. Pupils are equal, round, and reactive to light.   Cardiovascular: Normal rate, regular rhythm, normal heart sounds and intact distal pulses.  Exam reveals no gallop and no friction rub.    No murmur heard.  Pulmonary/Chest: Effort normal and breath sounds normal. No respiratory distress. She has no wheezes.   Neurological: She is alert and oriented to person, place, and time. GCS score is 15.   Skin: She is not diaphoretic.   Dry and necrotic original abcess site above the left lateral eyebrow close to the hairline. There is no local erythema, edema, or warthm         Lab Data Review:      1/9/2017  5:53 AM    Recent Labs      01/07/17 0159 01/08/17 0348 01/09/17 0217   SODIUM  139  137  138   POTASSIUM  3.8  3.7  3.6   CHLORIDE  107  103  105   CO2  23  25  25   BUN  5*  8  7*   CREATININE  0.43*  0.43*  0.39*   MAGNESIUM  1.8  1.8  1.8   CALCIUM  8.7  9.2  9.3       Recent Labs      01/07/17 0159 01/08/17 0348 01/09/17 0217   ALTSGPT  74*   --    --    ASTSGOT  46*   --    --    ALKPHOSPHAT  101*   --    --    TBILIRUBIN  0.2   --    --    GLUCOSE  93  99  104*       Recent Labs      01/07/17 0159 01/08/17 0348 01/09/17 0217   RBC  4.37  4.27  4.33   HEMOGLOBIN  12.1  11.6*  11.9*   HEMATOCRIT  37.4  36.2*  36.7*   PLATELETCT  256  295  354       Recent Labs      01/07/17 0159 01/08/17 0348 01/09/17 0217   WBC  4.0*  4.5*  6.1   NEUTSPOLYS  46.40  51.80  47.30   LYMPHOCYTES  40.20  40.30  38.50   MONOCYTES  6.30  5.30  8.60   EOSINOPHILS  7.10*  1.70  4.40    BASOPHILS  0.00  0.90  0.70   ASTSGOT  46*   --    --    ALTSGPT  74*   --    --    ALKPHOSPHAT  101*   --    --    TBILIRUBIN  0.2   --    --            Assessment/Plan    1. Bacteremia with MRSA  -currently being treated with Vancomycin 1400mg 250mL IVPB  -continue to treat with IV antibiotics until PICC line in place after blood cultures (drawn 1/8) are confirmed as negative  -patient states that IV Mag causes pain; change to PO to increase adherence to medication and  patient on SEs of diarrhea/GI SEs    2. Orbital Cellulitis  -continue to treat with Tobradex eye drops q2 hrs consider reducing to q4 hours while awake  -continue optho checks regularly: EOM, pain with EOM, changes in vision, worsening to vital signs and symptoms that may indicate any risk of cavernous sinus thrombosis    3. Transaminitis  -no new labs  -ctm if applicable once new labs are drawn (CMP)    4. Hypothyroidism  -repeat TSH   -f/u outpatient once patient stabilized unless new signs and symptoms of hypthyroidism (menstrual irregularities, cold intolerance, weight gain, etc) are established    5. Anemia  -baseline H/H has been around 12/36  -anemia is likely linked to active infection and bacteremia  -ctm

## 2017-01-09 NOTE — PROGRESS NOTES
"Pharmacy Kinetics 31 y.o. female on vancomycin day # 6 2017    Currently on Vancomycin 1200 mg iv q8hr (0000, 0800, 1600)    Indication for Treatment: facial cellulitis    Pertinent history per medical record: Admitted on 1/3/2017 for facial cellulitis.  Patient with left forehead swelling above eye.  She thought it was a pimple, but today is much larger and painful.  CT orbits with possible cellulitis.  Pt seen by ID today and plan for IV vancomycin x 14 days from negative cultures.   is last negative culture, so stop date of .   Other antibiotics: none    Allergies: Amoxicillin and Penicillins     List concerns for renal function: transaminitis    Pertinent cultures to date:   17: blood (peripheral) NEG x2  17: wound (head) POS - methicillin resistant staphylococcus aureus, heavy growth  1/3/17: blood (peripheral) POS - gram positive cocci, possible staphylococcus sp. x2    Recent Labs      17   0150  17   0159  17   0348   WBC  3.9*  4.0*  4.5*   NEUTSPOLYS  66.10  46.40  51.80   BANDSSTABS  6.90   --    --      Recent Labs      17   0150  17   0159  17   0348   BUN  5*  5*  8   CREATININE  0.35*  0.43*  0.43*   ALBUMIN  3.0*  3.1*   --      Recent Labs      17   0711   VANCOTROUGH  10.9     Intake/Output Summary (Last 24 hours) at 17 1705  Last data filed at 17 0600   Gross per 24 hour   Intake   1280 ml   Output      0 ml   Net   1280 ml      Blood pressure 108/72, pulse 71, temperature 36.4 °C (97.6 °F), temperature source Temporal, resp. rate 18, height 1.575 m (5' 2.01\"), weight 68.9 kg (151 lb 14.4 oz), last menstrual period 2011, SpO2 96 %. Temp (24hrs), Av.5 °C (97.7 °F), Min:36 °C (96.8 °F), Max:37 °C (98.6 °F)      A/P   1. Vancomycin dose change: continue current dosing  2. Next vancomycin level: 1/9/17 9872  3. Goal trough: 12-16 mcg/mL  4. Comments: renal indices stable. Will order a trough with tomorrow " afternoon's dose and adjust dosing if trough not within goal range    Nataliya Stephens, PharmD

## 2017-01-09 NOTE — PROGRESS NOTES
Received report and assumed care of pt. Pt A&Ox4, call light within reach, siderails upx2, bed in low and locked position. Discussed poc with pt. Pt denies any pain. No deficit in L eye vision. L PIV patent and tko. Ambulatory with steady gait. Hourly rounding in place.

## 2017-01-09 NOTE — CARE PLAN
Problem: Infection  Goal: Will remain free from infection  Outcome: PROGRESSING AS EXPECTED  Pt receiving IV vanco.    Problem: Pain Management  Goal: Pain level will decrease to patient’s comfort goal  Outcome: PROGRESSING AS EXPECTED  Pt denies any pain.

## 2017-01-09 NOTE — PROGRESS NOTES
PICC Note:  Picc order received, patient had blood  Drawn 1/8/17 at 0348, PICC can be placed 1/10/17 after 0348 if BC negative.  RN to obtain consent prior to placement.

## 2017-01-09 NOTE — PROGRESS NOTES
Pt AOx4, up self. Pt slept most of day. Pt verbalized that magnesium burns during administration, no complaints with NS or vanco.

## 2017-01-10 ENCOUNTER — APPOINTMENT (OUTPATIENT)
Dept: RADIOLOGY | Facility: MEDICAL CENTER | Age: 32
DRG: 872 | End: 2017-01-10
Attending: HOSPITALIST
Payer: MEDICAID

## 2017-01-10 LAB
ANION GAP SERPL CALC-SCNC: 7 MMOL/L (ref 0–11.9)
BASOPHILS # BLD AUTO: 0.7 % (ref 0–1.8)
BASOPHILS # BLD: 0.05 K/UL (ref 0–0.12)
BUN SERPL-MCNC: 8 MG/DL (ref 8–22)
CALCIUM SERPL-MCNC: 9.1 MG/DL (ref 8.5–10.5)
CHLORIDE SERPL-SCNC: 101 MMOL/L (ref 96–112)
CO2 SERPL-SCNC: 27 MMOL/L (ref 20–33)
CREAT SERPL-MCNC: 0.4 MG/DL (ref 0.5–1.4)
EOSINOPHIL # BLD AUTO: 0.29 K/UL (ref 0–0.51)
EOSINOPHIL NFR BLD: 4.1 % (ref 0–6.9)
ERYTHROCYTE [DISTWIDTH] IN BLOOD BY AUTOMATED COUNT: 40.4 FL (ref 35.9–50)
GFR SERPL CREATININE-BSD FRML MDRD: >60 ML/MIN/1.73 M 2
GLUCOSE SERPL-MCNC: 96 MG/DL (ref 65–99)
HCT VFR BLD AUTO: 35.8 % (ref 37–47)
HGB BLD-MCNC: 11.9 G/DL (ref 12–16)
IMM GRANULOCYTES # BLD AUTO: 0.07 K/UL (ref 0–0.11)
IMM GRANULOCYTES NFR BLD AUTO: 1 % (ref 0–0.9)
LYMPHOCYTES # BLD AUTO: 2.51 K/UL (ref 1–4.8)
LYMPHOCYTES NFR BLD: 35.3 % (ref 22–41)
MCH RBC QN AUTO: 28.5 PG (ref 27–33)
MCHC RBC AUTO-ENTMCNC: 33.2 G/DL (ref 33.6–35)
MCV RBC AUTO: 85.6 FL (ref 81.4–97.8)
MONOCYTES # BLD AUTO: 0.52 K/UL (ref 0–0.85)
MONOCYTES NFR BLD AUTO: 7.3 % (ref 0–13.4)
NEUTROPHILS # BLD AUTO: 3.68 K/UL (ref 2–7.15)
NEUTROPHILS NFR BLD: 51.6 % (ref 44–72)
NRBC # BLD AUTO: 0 K/UL
NRBC BLD AUTO-RTO: 0 /100 WBC
PLATELET # BLD AUTO: 383 K/UL (ref 164–446)
PMV BLD AUTO: 9 FL (ref 9–12.9)
POTASSIUM SERPL-SCNC: 3.9 MMOL/L (ref 3.6–5.5)
RBC # BLD AUTO: 4.18 M/UL (ref 4.2–5.4)
SODIUM SERPL-SCNC: 135 MMOL/L (ref 135–145)
WBC # BLD AUTO: 7.1 K/UL (ref 4.8–10.8)

## 2017-01-10 PROCEDURE — 700102 HCHG RX REV CODE 250 W/ 637 OVERRIDE(OP): Performed by: HOSPITALIST

## 2017-01-10 PROCEDURE — 700102 HCHG RX REV CODE 250 W/ 637 OVERRIDE(OP): Performed by: INTERNAL MEDICINE

## 2017-01-10 PROCEDURE — 700102 HCHG RX REV CODE 250 W/ 637 OVERRIDE(OP): Performed by: EMERGENCY MEDICINE

## 2017-01-10 PROCEDURE — 99232 SBSQ HOSP IP/OBS MODERATE 35: CPT | Mod: GC | Performed by: INTERNAL MEDICINE

## 2017-01-10 PROCEDURE — 700101 HCHG RX REV CODE 250: Performed by: OPHTHALMOLOGY

## 2017-01-10 PROCEDURE — 36415 COLL VENOUS BLD VENIPUNCTURE: CPT

## 2017-01-10 PROCEDURE — 700111 HCHG RX REV CODE 636 W/ 250 OVERRIDE (IP)

## 2017-01-10 PROCEDURE — 80048 BASIC METABOLIC PNL TOTAL CA: CPT

## 2017-01-10 PROCEDURE — 700105 HCHG RX REV CODE 258

## 2017-01-10 PROCEDURE — 770021 HCHG ROOM/CARE - ISO PRIVATE

## 2017-01-10 PROCEDURE — 85025 COMPLETE CBC W/AUTO DIFF WBC: CPT

## 2017-01-10 PROCEDURE — A9270 NON-COVERED ITEM OR SERVICE: HCPCS | Performed by: HOSPITALIST

## 2017-01-10 PROCEDURE — A9270 NON-COVERED ITEM OR SERVICE: HCPCS | Performed by: EMERGENCY MEDICINE

## 2017-01-10 PROCEDURE — A9270 NON-COVERED ITEM OR SERVICE: HCPCS | Performed by: INTERNAL MEDICINE

## 2017-01-10 RX ORDER — POTASSIUM CHLORIDE 20 MEQ/1
20 TABLET, EXTENDED RELEASE ORAL ONCE
Status: COMPLETED | OUTPATIENT
Start: 2017-01-10 | End: 2017-01-10

## 2017-01-10 RX ADMIN — VANCOMYCIN HYDROCHLORIDE 1400 MG: 10 INJECTION, POWDER, LYOPHILIZED, FOR SOLUTION INTRAVENOUS at 00:15

## 2017-01-10 RX ADMIN — VANCOMYCIN HYDROCHLORIDE 1400 MG: 10 INJECTION, POWDER, LYOPHILIZED, FOR SOLUTION INTRAVENOUS at 23:20

## 2017-01-10 RX ADMIN — TOBRAMYCIN AND DEXAMETHASONE 1 DROP: 3; 1 SUSPENSION/ DROPS OPHTHALMIC at 23:20

## 2017-01-10 RX ADMIN — TOBRAMYCIN AND DEXAMETHASONE 1 DROP: 3; 1 SUSPENSION/ DROPS OPHTHALMIC at 10:00

## 2017-01-10 RX ADMIN — POTASSIUM CHLORIDE 20 MEQ: 1500 TABLET, EXTENDED RELEASE ORAL at 09:41

## 2017-01-10 RX ADMIN — Medication 400 MG: at 20:12

## 2017-01-10 RX ADMIN — VANCOMYCIN HYDROCHLORIDE 1400 MG: 10 INJECTION, POWDER, LYOPHILIZED, FOR SOLUTION INTRAVENOUS at 18:30

## 2017-01-10 RX ADMIN — TOBRAMYCIN AND DEXAMETHASONE 1 DROP: 3; 1 SUSPENSION/ DROPS OPHTHALMIC at 14:00

## 2017-01-10 RX ADMIN — Medication 100 MG: at 09:41

## 2017-01-10 RX ADMIN — Medication 1 TABLET: at 20:12

## 2017-01-10 RX ADMIN — VANCOMYCIN HYDROCHLORIDE 1400 MG: 10 INJECTION, POWDER, LYOPHILIZED, FOR SOLUTION INTRAVENOUS at 09:41

## 2017-01-10 RX ADMIN — Medication 400 MG: at 09:41

## 2017-01-10 RX ADMIN — HYDROCODONE BITARTRATE AND ACETAMINOPHEN 2 TABLET: 5; 325 TABLET ORAL at 17:49

## 2017-01-10 RX ADMIN — TOBRAMYCIN AND DEXAMETHASONE 1 DROP: 3; 1 SUSPENSION/ DROPS OPHTHALMIC at 12:00

## 2017-01-10 RX ADMIN — TOBRAMYCIN AND DEXAMETHASONE 1 DROP: 3; 1 SUSPENSION/ DROPS OPHTHALMIC at 18:00

## 2017-01-10 ASSESSMENT — ENCOUNTER SYMPTOMS
EYE REDNESS: 1
WEIGHT LOSS: 0
EYE DISCHARGE: 0
HEADACHES: 0
FEVER: 1
PHOTOPHOBIA: 0
NEUROLOGICAL NEGATIVE: 1
CHILLS: 0
RESPIRATORY NEGATIVE: 1
SORE THROAT: 0
MUSCULOSKELETAL NEGATIVE: 1
EYE PAIN: 1
CARDIOVASCULAR NEGATIVE: 1
BLURRED VISION: 0
GASTROINTESTINAL NEGATIVE: 1
PSYCHIATRIC NEGATIVE: 1
DOUBLE VISION: 0

## 2017-01-10 ASSESSMENT — PAIN SCALES - GENERAL
PAINLEVEL_OUTOF10: 0
PAINLEVEL_OUTOF10: 0

## 2017-01-10 NOTE — PROGRESS NOTES
Pt verbalized anxiety about PICC placement. Provided pt with krames info on PICC, addressed pt questions. Pt requested to be left alone to sleep until PICC gets placed.

## 2017-01-10 NOTE — PROGRESS NOTES
[facility] [place] [facility] Medicine Interval Note    Name Rola Carrillo 1985   Age/Sex 31 y.o. female   MRN [id number]   Code Status Full     After 5PM or if no immediate response to page, please call for cross-coverage  Attending/Team: Douglas/Dr. Apple Call [phone] to page   1st Call - Day Intern (R1):   Dr. Ruzi 2nd Call - Day Sr. Resident (R2/R3):   Dr. Little         Chief complaint/ reason for interval visit (Primary Diagnosis)   Left pre and post septal orbital cellulitis and left forehead    Interval Problem Daily Status Update    - Patient feeling good; Left eye much better; minimal swelling  - no fever  VS stable  - repeat blood cultures negative for MRSA so far;PICC line in AM  - continue vanco   - closely monitor the left eye  - paged for some vaginal itching, with concern for yeast infection; examined pt with Medical student Anjana; no discharge; normal externa genitalia and no discharge noted; advised patient to notify with symptoms worsens. Pt on antibiotics treatment.       Active Hospital Problems    Diagnosis   • Hyperthyroidism [E05.90] repeat tsh improved   • Cellulitis of left orbit [H05.012] improving       Review of Systems   Constitutional: Positive for fever. Negative for chills and weight loss.   HENT: Negative for congestion, nosebleeds and sore throat.    Eyes: Positive for pain and redness. Negative for blurred vision, double vision, photophobia and discharge.        Left eyelid able to open better than before  Right eye wnl   Respiratory: Negative.    Cardiovascular: Negative.    Gastrointestinal: Negative.    Genitourinary: Negative.    Musculoskeletal: Negative.    Skin:        Erythema of left upper face and periorbital region has significantly improved.    Neurological: Negative.  Negative for headaches.   Endo/Heme/Allergies: Negative.    Psychiatric/Behavioral: Negative.        Consultants/Specialty  Ophthalmology  Infectious Disease:   Babak    Disposition  Inpatient being treated for orbital cellulitis    Quality Measures  Radiology images reviewed, Labs reviewed and Medications reviewed  Castellanos catheter: No Castellanos      DVT Prophylaxis: Enoxaparin (Lovenox)                  Physical Exam       Filed Vitals:    01/09/17 0400 01/09/17 0800 01/09/17 1600 01/09/17 2000   BP: 103/63 109/58 114/72 122/65   Pulse: 70 60 76 87   Temp: 36.4 °C (97.6 °F) 36.4 °C (97.5 °F) 36.6 °C (97.9 °F) 37.1 °C (98.8 °F)   TempSrc:       Resp: 16 13 14 16   Height:       Weight:       SpO2: 97% 96% 96% 99%     Body mass index is 27.78 kg/(m^2).    Oxygen Therapy:  Pulse Oximetry: 99 %, O2 (LPM): 0, O2 Delivery: None (Room Air)    Physical Exam   Constitutional: She is oriented to person, place, and time and well-developed, well-nourished, and in no distress. No distress.   HENT:   Head: Normocephalic and atraumatic.   Eyes: Right eye exhibits no discharge. Left eye exhibits chemosis. Left eye exhibits no discharge. Right conjunctiva is not injected. Right conjunctiva has no hemorrhage. Left conjunctiva is injected. Left conjunctiva has no hemorrhage. No scleral icterus. Right eye exhibits normal extraocular motion and no nystagmus. Left eye exhibits abnormal extraocular motion. Left eye exhibits no nystagmus. Right pupil is round and reactive. Left pupil is round and reactive.       EOM better on left now, RT wnl   Neck: Normal range of motion. Neck supple. No thyromegaly present.   Cardiovascular: Regular rhythm and normal heart sounds.    No murmur heard.  Pulmonary/Chest: Effort normal and breath sounds normal.   Abdominal: Soft. Bowel sounds are normal.   Neurological: She is alert and oriented to person, place, and time.   More awake than before.    Skin: She is not diaphoretic.   Psychiatric: Affect normal.   Vitals reviewed.        Lab Data Review:      1/4/2017  5:49 PM    Recent Labs      01/07/17   0159  01/08/17   0348  01/09/17   0217   SODIUM  139  137  138    POTASSIUM  3.8  3.7  3.6   CHLORIDE  107  103  105   CO2  23  25  25   BUN  5*  8  7*   CREATININE  0.43*  0.43*  0.39*   MAGNESIUM  1.8  1.8  1.8   CALCIUM  8.7  9.2  9.3       Recent Labs      01/07/17   0159 01/08/17 0348 01/09/17 0217   ALTSGPT  74*   --    --    ASTSGOT  46*   --    --    ALKPHOSPHAT  101*   --    --    TBILIRUBIN  0.2   --    --    GLUCOSE  93  99  104*       Recent Labs      01/07/17   0159 01/08/17 0348 01/09/17 0217 01/09/17   0830   RBC  4.37  4.27  4.33   --    HEMOGLOBIN  12.1  11.6*  11.9*   --    HEMATOCRIT  37.4  36.2*  36.7*   --    PLATELETCT  256  295  354   --    PROTHROMBTM   --    --    --   13.3   INR   --    --    --   0.98   IRON   --    --    --   27*   FERRITIN   --    --    --   55.7   TOTIRONBC   --    --    --   344       Recent Labs      01/07/17 0159 01/08/17 0348 01/09/17 0217   WBC  4.0*  4.5*  6.1   NEUTSPOLYS  46.40  51.80  47.30   LYMPHOCYTES  40.20  40.30  38.50   MONOCYTES  6.30  5.30  8.60   EOSINOPHILS  7.10*  1.70  4.40   BASOPHILS  0.00  0.90  0.70   ASTSGOT  46*   --    --    ALTSGPT  74*   --    --    ALKPHOSPHAT  101*   --    --    TBILIRUBIN  0.2   --    --            Assessment/Plan     Cellulitis of left orbit  Assessment:  -Left upper face swelling/erythema involving upper eyelid and eye  - On admission: acute onset x 2 days after pimple appeared on the left side of forehead; progressively involved the left eyelid with swelling and unable to open the eye    - on admission: grossly intact extra-ocular muscles movement; with normal vision 20/23 with hand-held snellen chart    - on admission: WBC count: 17.6, VS: /76, pulse 83, temp 36.5 °C (97.7 °F), SpO2 100 %. RA   - tonometry in ED: 12 RE, 24 LE  - CT orbits: orbital cellulitis  - 1/4/17: worsening swelling of eyelid; EOM restricted (partly due to worsening chemosis), Vision intact 20/30, light reflex normal; ophthalmology on board; erythema has reduced and regressed from  the initial marking; also purulent substance from the pustule, sent for culture.    - Ophthalmology following closely: started patient on tobramycin-dexamethasone drops q2hrs  - MR head w/wo: Impression: mild orbital and periorbital cellulitis, mild proptosis, no abscess, no evidence of cavernous sinus thrombosis.   - continued on vancomycin and d/c ceftriaxone given staph aureus; MRSA + in both wound and blood  - 1/8: significant improvement in the left eye, able to open more, swelling significantly decreased; chemosis decreased.  > PLAN:   - continue Vancomycin per ID recs  - ECHO: wnl, EF: 65%, no valvular vegetation  -  repeat BCx 1/5: no growth  - 14 days IV vancomycin from 1/5, will order PICC line in AM (1/10)  - eye exam improved  - tylenol prn for pain and fever.  - ctm         Hyperthyroidism  - TSH: 0.280  - Free T-4: 2.12  - no physical symptoms concerning hyperthyroidism; except HR >100, however, patient has fever with infection   - will need further workup once acute complaint is more stable.   - consider TSI/ US   - repeat TSH wnl; likely due to acute infectious process; will monitor.     Elevated liver enzymes  - 1/6 liver enzymes: AST: 53  ALT: 80 -->AST: 46, ALT: 74; trending down  - ?possibly from Ceftriaxone  - will monitor; pt asymptomatic    Bacteremia due to Staphylococcus  Blood and wound CX positive for MRSA  - ?from infected wound; pt denies IV drug use, UDS + for stimulants  - continue vancomycin for now; will need for around 2 weeks from day culture negative (1/5) per ID recs  - ECHO: normal; EF 65%, no valvular vegetations  - ctm     Anemia  Hb: on admission 14.1; 1/6/17: 10.9,  -MCV: 90-->84 (?iron losses); Hb: 12.1(1/7)--Improving  - pt reported heavy menstrual cycle this time  - also has active infection; likely inflammatory process causing drop in Hb  - will monitor; and if drop further; will consider full anemia workup

## 2017-01-10 NOTE — PROGRESS NOTES
"Pharmacy Kinetics 31 y.o. female on vancomycin day # 7 2017    Currently on Vancomycin 1200 mg iv q8hr (0000, 0800, 1600)    Indication for Treatment: facial cellulitis    Pertinent history per medical record: Admitted on 1/3/2017 for facial cellulitis.  Patient with left forehead swelling above eye.  She thought it was a pimple, but today is much larger and painful.  CT orbits with possible cellulitis.  Pt seen by ID today and plan for IV vancomycin x 14 days from negative cultures.   is last negative culture, so stop date of .    Other antibiotics: none    Allergies: Amoxicillin and Penicillins     List concerns for renal function: transaminitis    Pertinent cultures to date:   17: blood peripheral - no growth to date   17: blood (peripheral) NEG x2  17: wound (head) POS - methicillin resistant staphylococcus aureus, heavy growth  1/3/17: blood (peripheral) POS - gram positive cocci, possible staphylococcus sp. x2    Recent Labs      17   0159  17   0348  17   0217   WBC  4.0*  4.5*  6.1   NEUTSPOLYS  46.40  51.80  47.30     Recent Labs      17   0159  17   0348  17   0217   BUN  5*  8  7*   CREATININE  0.43*  0.43*  0.39*   ALBUMIN  3.1*   --    --      Recent Labs      17   1515   VANCOTROUGH  15.8     Intake/Output Summary (Last 24 hours) at 17 1659  Last data filed at 17 0800   Gross per 24 hour   Intake   1630 ml   Output      0 ml   Net   1630 ml      Blood pressure 114/72, pulse 76, temperature 36.6 °C (97.9 °F), temperature source Temporal, resp. rate 14, height 1.575 m (5' 2.01\"), weight 68.9 kg (151 lb 14.4 oz), last menstrual period 2011, SpO2 96 %. Temp (24hrs), Av.4 °C (97.5 °F), Min:36.2 °C (97.1 °F), Max:36.6 °C (97.9 °F)      A/P   1. Vancomycin dose change: continue current dosing   2. Next vancomycin level: 1530 today   3. Goal trough: 12-16 mcg/mL  4. Comments:  Trough within goal range, renal indices " stable. Continue Vanco 1400 mg iv every 8 hours    Nataliya Stephens,PharmD

## 2017-01-10 NOTE — PROGRESS NOTES
Blood cultures negative to date- PICC line ordered.   If negative by 0348 on 1/10, patient can have PICC line placed. No history of prior DVTs.   Consent form signed and in chart.     Currently tolerating IV vanco through PIV, no complications.   Patient up self.

## 2017-01-10 NOTE — PROGRESS NOTES
[facility] [place] INTERNAL MEDICINE ATTENDING NOTE:   Danilo Rojo MD      Visit Time:   Attending/resident bedside rounds 9-11:30 AM     PATIENT ID  Name:             Rola Carrillo   YOB: 1985  Age:                 31 y.o.  female   MRN:               [id number]  Admit:  1/3/2017    The patient was evaluated with the resident staff.  I reviewed the resident's note and agree with the resident's findings and plan as documented in the resident's note except as documented in the attending note. Please reference resident daily note for complete information.    The chart was reviewed and summarized.  Available labs, imaging, O2 sats ,  EKGs were reviewed. Available nursing, consultant, and resident notes were reviewed. I am actively involved in the patient's care.                                                                          ________________________________________________________________________             31(  admit Jan 3rd  )  INTERVAL:  Chart reviewed/summarized,       *Jan 9AM: AF, HR 70, , 97% RA -- improving cellulitis , planned 14 days  DATA: WBC 6.1 (4.5), HB 11.9 (11.6-12.1),  (295) , Na 138, K 3.6, CO2 25, , BUN 7, C R0.39, LT anl, ALB 3.1   NURSING: AO4, siderails, sedated  Impresson: orbital celluliits, improving, polysubstance abuse      *Jan 8: VSS  DATA: WBC 4.5 (3.9), HB 11.6 (10-11),  (187) , Na 137, K 3.7 (3.1), CO2 25, CR 0. 43 (0.35), AST 46, ALT 74, , ALB 3, GM 1.8      CORE:  Code Status (  FULL  --------------------------------------------------------------------------------------------------  Hospital Summary/ Patient System Review      NP:   *admit(  UTOX: amphetamine, opiates,      MSK/PAIN:   *admit(  ESR 33, CRP 1.43      CVS:   *admit(  QTc 444, no ACS  Jan 8:  ECHO: EF 65%, RV/RA wnl, LA wnl     PULM:   *admit(       GI:   *admit(  LT wnl, alb 3.6     RENAL:   *admit(  Na 134, K 4, CO2 22, , CR 0.54 --> N a137, K 3.7, CO2 25,  CR 0. 43, CA 9.2, MG 1.8, UA: ?hemolyzed?      HEME/ONC:   *admit(  WBC 17-18, HB 14,  --. WBc 4.5, HB 11-12,       ENDO:   *admit(  TSH 0.280, FT4 2.12, preg negative      ID:   *admit(  BC MRSA x 2  Noman 3 & 4, BC neg Jan 6, HIV negative   Noman 3 CT orbits: infuration of left perorbital , supraorbital and ifnraorbital soft tissues, left EOM infalmmation, orbital cellulitis, no fracture  Jan 4 MRI brain: left orbital and periorbital cellulitis, left occular proptosis, no abscess?, no cavernous sinus thrombosis   ImpressioN: orbital celluliitis , allergy PEN?   Plan: VANCO      2012: hep B negative

## 2017-01-10 NOTE — PROGRESS NOTES
Received report and assumed care of pt. Pt A&Ox4, call light within reach, siderails upx2, bed in low and locked position. Discussed poc with pt. Denies any pain currently. No deficit in L eye vision. Ambulatory with steady gait. Discussed inserting new PIV. Hourly rounding in place.

## 2017-01-10 NOTE — CARE PLAN
Problem: Infection  Goal: Will remain free from infection  Outcome: PROGRESSING AS EXPECTED  Patient receiving IV antibiotics for L orbital cellulitis. Per patient, swelling is improving.     Problem: Mobility  Goal: Risk for activity intolerance will decrease  Outcome: PROGRESSING AS EXPECTED  Patient ambulates independently.

## 2017-01-10 NOTE — CARE PLAN
Problem: Infection  Goal: Will remain free from infection  Outcome: PROGRESSING AS EXPECTED  No s/s of new or worsening infection. Vital signs stable. Abx as ordered.     Problem: Psychosocial Needs:  Goal: Level of anxiety will decrease  Outcome: PROGRESSING AS EXPECTED  Pt encouraged to verbalize anxieties, fears, concerns. Cares and procedures explained. Pt questions addressed.

## 2017-01-10 NOTE — CARE PLAN
Problem: Infection  Goal: Will remain free from infection  Outcome: PROGRESSING AS EXPECTED  Pt receiving IV vanco.    Problem: Pain Management  Goal: Pain level will decrease to patient’s comfort goal  Outcome: PROGRESSING AS EXPECTED  Pt reports pain managed adequately.

## 2017-01-11 LAB
ANION GAP SERPL CALC-SCNC: 6 MMOL/L (ref 0–11.9)
BACTERIA BLD CULT: NORMAL
BACTERIA BLD CULT: NORMAL
BASOPHILS # BLD AUTO: 0.6 % (ref 0–1.8)
BASOPHILS # BLD: 0.04 K/UL (ref 0–0.12)
BUN SERPL-MCNC: 9 MG/DL (ref 8–22)
CALCIUM SERPL-MCNC: 9.4 MG/DL (ref 8.5–10.5)
CHLORIDE SERPL-SCNC: 101 MMOL/L (ref 96–112)
CO2 SERPL-SCNC: 29 MMOL/L (ref 20–33)
CREAT SERPL-MCNC: 0.45 MG/DL (ref 0.5–1.4)
EOSINOPHIL # BLD AUTO: 0.29 K/UL (ref 0–0.51)
EOSINOPHIL NFR BLD: 4.3 % (ref 0–6.9)
ERYTHROCYTE [DISTWIDTH] IN BLOOD BY AUTOMATED COUNT: 39.9 FL (ref 35.9–50)
GFR SERPL CREATININE-BSD FRML MDRD: >60 ML/MIN/1.73 M 2
GLUCOSE SERPL-MCNC: 83 MG/DL (ref 65–99)
HCT VFR BLD AUTO: 36.4 % (ref 37–47)
HGB BLD-MCNC: 12.1 G/DL (ref 12–16)
IMM GRANULOCYTES # BLD AUTO: 0.12 K/UL (ref 0–0.11)
IMM GRANULOCYTES NFR BLD AUTO: 1.8 % (ref 0–0.9)
LYMPHOCYTES # BLD AUTO: 2.72 K/UL (ref 1–4.8)
LYMPHOCYTES NFR BLD: 40.8 % (ref 22–41)
MAGNESIUM SERPL-MCNC: 2.1 MG/DL (ref 1.5–2.5)
MCH RBC QN AUTO: 28.5 PG (ref 27–33)
MCHC RBC AUTO-ENTMCNC: 33.2 G/DL (ref 33.6–35)
MCV RBC AUTO: 85.8 FL (ref 81.4–97.8)
MONOCYTES # BLD AUTO: 0.52 K/UL (ref 0–0.85)
MONOCYTES NFR BLD AUTO: 7.8 % (ref 0–13.4)
NEUTROPHILS # BLD AUTO: 2.98 K/UL (ref 2–7.15)
NEUTROPHILS NFR BLD: 44.7 % (ref 44–72)
NRBC # BLD AUTO: 0 K/UL
NRBC BLD AUTO-RTO: 0 /100 WBC
PLATELET # BLD AUTO: 386 K/UL (ref 164–446)
PMV BLD AUTO: 8.6 FL (ref 9–12.9)
POTASSIUM SERPL-SCNC: 4.2 MMOL/L (ref 3.6–5.5)
RBC # BLD AUTO: 4.24 M/UL (ref 4.2–5.4)
SIGNIFICANT IND 70042: NORMAL
SIGNIFICANT IND 70042: NORMAL
SITE SITE: NORMAL
SITE SITE: NORMAL
SODIUM SERPL-SCNC: 136 MMOL/L (ref 135–145)
SOURCE SOURCE: NORMAL
SOURCE SOURCE: NORMAL
WBC # BLD AUTO: 6.7 K/UL (ref 4.8–10.8)

## 2017-01-11 PROCEDURE — 700102 HCHG RX REV CODE 250 W/ 637 OVERRIDE(OP): Performed by: EMERGENCY MEDICINE

## 2017-01-11 PROCEDURE — 83735 ASSAY OF MAGNESIUM: CPT

## 2017-01-11 PROCEDURE — 770021 HCHG ROOM/CARE - ISO PRIVATE

## 2017-01-11 PROCEDURE — 700105 HCHG RX REV CODE 258

## 2017-01-11 PROCEDURE — A9270 NON-COVERED ITEM OR SERVICE: HCPCS | Performed by: HOSPITALIST

## 2017-01-11 PROCEDURE — 700111 HCHG RX REV CODE 636 W/ 250 OVERRIDE (IP): Performed by: HOSPITALIST

## 2017-01-11 PROCEDURE — 85025 COMPLETE CBC W/AUTO DIFF WBC: CPT

## 2017-01-11 PROCEDURE — 700111 HCHG RX REV CODE 636 W/ 250 OVERRIDE (IP)

## 2017-01-11 PROCEDURE — 700102 HCHG RX REV CODE 250 W/ 637 OVERRIDE(OP): Performed by: HOSPITALIST

## 2017-01-11 PROCEDURE — 700112 HCHG RX REV CODE 229: Performed by: HOSPITALIST

## 2017-01-11 PROCEDURE — 80048 BASIC METABOLIC PNL TOTAL CA: CPT

## 2017-01-11 PROCEDURE — A9270 NON-COVERED ITEM OR SERVICE: HCPCS | Performed by: EMERGENCY MEDICINE

## 2017-01-11 PROCEDURE — 700102 HCHG RX REV CODE 250 W/ 637 OVERRIDE(OP): Performed by: INTERNAL MEDICINE

## 2017-01-11 PROCEDURE — 99232 SBSQ HOSP IP/OBS MODERATE 35: CPT | Mod: GC | Performed by: INTERNAL MEDICINE

## 2017-01-11 PROCEDURE — A9270 NON-COVERED ITEM OR SERVICE: HCPCS | Performed by: INTERNAL MEDICINE

## 2017-01-11 RX ORDER — SODIUM CHLORIDE 9 MG/ML
500 INJECTION, SOLUTION INTRAVENOUS ONCE
Status: COMPLETED | OUTPATIENT
Start: 2017-01-11 | End: 2017-01-11

## 2017-01-11 RX ADMIN — TOBRAMYCIN AND DEXAMETHASONE 1 DROP: 3; 1 SUSPENSION/ DROPS OPHTHALMIC at 17:56

## 2017-01-11 RX ADMIN — TOBRAMYCIN AND DEXAMETHASONE 1 DROP: 3; 1 SUSPENSION/ DROPS OPHTHALMIC at 14:00

## 2017-01-11 RX ADMIN — TOBRAMYCIN AND DEXAMETHASONE 1 DROP: 3; 1 SUSPENSION/ DROPS OPHTHALMIC at 12:00

## 2017-01-11 RX ADMIN — TOBRAMYCIN AND DEXAMETHASONE 1 DROP: 3; 1 SUSPENSION/ DROPS OPHTHALMIC at 23:32

## 2017-01-11 RX ADMIN — DOCUSATE SODIUM 100 MG: 100 CAPSULE ORAL at 09:48

## 2017-01-11 RX ADMIN — VANCOMYCIN HYDROCHLORIDE 1400 MG: 10 INJECTION, POWDER, LYOPHILIZED, FOR SOLUTION INTRAVENOUS at 15:50

## 2017-01-11 RX ADMIN — VANCOMYCIN HYDROCHLORIDE 1400 MG: 10 INJECTION, POWDER, LYOPHILIZED, FOR SOLUTION INTRAVENOUS at 09:48

## 2017-01-11 RX ADMIN — TOBRAMYCIN AND DEXAMETHASONE 1 DROP: 3; 1 SUSPENSION/ DROPS OPHTHALMIC at 09:55

## 2017-01-11 RX ADMIN — TOBRAMYCIN AND DEXAMETHASONE 1 DROP: 3; 1 SUSPENSION/ DROPS OPHTHALMIC at 04:40

## 2017-01-11 RX ADMIN — Medication 400 MG: at 23:33

## 2017-01-11 RX ADMIN — TOBRAMYCIN AND DEXAMETHASONE 1 DROP: 3; 1 SUSPENSION/ DROPS OPHTHALMIC at 07:48

## 2017-01-11 RX ADMIN — HYDROCODONE BITARTRATE AND ACETAMINOPHEN 2 TABLET: 5; 325 TABLET ORAL at 23:37

## 2017-01-11 RX ADMIN — TOBRAMYCIN AND DEXAMETHASONE 1 DROP: 3; 1 SUSPENSION/ DROPS OPHTHALMIC at 15:50

## 2017-01-11 RX ADMIN — Medication 100 MG: at 09:48

## 2017-01-11 RX ADMIN — VANCOMYCIN HYDROCHLORIDE 1400 MG: 10 INJECTION, POWDER, LYOPHILIZED, FOR SOLUTION INTRAVENOUS at 23:32

## 2017-01-11 RX ADMIN — Medication 400 MG: at 09:48

## 2017-01-11 RX ADMIN — SODIUM CHLORIDE 500 ML: 9 INJECTION, SOLUTION INTRAVENOUS at 07:48

## 2017-01-11 ASSESSMENT — ENCOUNTER SYMPTOMS
MUSCULOSKELETAL NEGATIVE: 1
BLURRED VISION: 0
HEADACHES: 0
PHOTOPHOBIA: 0
FEVER: 1
CHILLS: 0
PSYCHIATRIC NEGATIVE: 1
RESPIRATORY NEGATIVE: 1
DOUBLE VISION: 0
EYE DISCHARGE: 0
GASTROINTESTINAL NEGATIVE: 1
WEIGHT LOSS: 0
EYE REDNESS: 1
NEUROLOGICAL NEGATIVE: 1
SORE THROAT: 0
EYE PAIN: 1
CARDIOVASCULAR NEGATIVE: 1

## 2017-01-11 ASSESSMENT — PATIENT HEALTH QUESTIONNAIRE - PHQ9
SUM OF ALL RESPONSES TO PHQ QUESTIONS 1-9: 0
2. FEELING DOWN, DEPRESSED, IRRITABLE, OR HOPELESS: NOT AT ALL
SUM OF ALL RESPONSES TO PHQ9 QUESTIONS 1 AND 2: 0
1. LITTLE INTEREST OR PLEASURE IN DOING THINGS: NOT AT ALL

## 2017-01-11 ASSESSMENT — PAIN SCALES - GENERAL
PAINLEVEL_OUTOF10: 0
PAINLEVEL_OUTOF10: 7

## 2017-01-11 NOTE — PROGRESS NOTES
Pt fatigued and sleeping most of morning. 500ml bolus administered per order. Monitoring VS Q4 today for hypotension. Denies pain. Up self and ambulates with steady gait. Calls for assistance with needs met at this time.

## 2017-01-11 NOTE — PROGRESS NOTES
[facility] [place] Internal Medicine Interval Note    Name Rola Carrillo 1985   Age/Sex 31 y.o. female   MRN [id number]   Code Status Full     After 5PM or if no immediate response to page, please call for cross-coverage  Attending/Team: Douglas/Dr. Apple Call [phone] to page   1st Call - Day Intern (R1):   Dr. Ruiz 2nd Call - Day Sr. Resident (R2/R3):   Dr. Little         Chief complaint/ reason for interval visit (Primary Diagnosis)   Left pre and post septal orbital cellulitis and left forehead    Interval Problem Daily Status Update    - Patient feeling good; Left eye much better; minimal swelling  - no fever  VS stable  - repeat blood cultures negative for MRSA   PICC line placed today  - continue vanco   - closely monitor the left eye  - denies vaginal itching/discharge today     Active Hospital Problems    Diagnosis   • Hyperthyroidism [E05.90] repeat tsh improved   • Cellulitis of left orbit [H05.012] improving       Review of Systems   Constitutional: Positive for fever. Negative for chills and weight loss.   HENT: Negative for congestion, nosebleeds and sore throat.    Eyes: Positive for pain and redness. Negative for blurred vision, double vision, photophobia and discharge.        Left eyelid able to open better than before  Right eye wnl   Respiratory: Negative.    Cardiovascular: Negative.    Gastrointestinal: Negative.    Genitourinary: Negative.    Musculoskeletal: Negative.    Skin:        Erythema of left upper face and periorbital region has significantly improved.    Neurological: Negative.  Negative for headaches.   Endo/Heme/Allergies: Negative.    Psychiatric/Behavioral: Negative.        Consultants/Specialty  Ophthalmology  Infectious Disease: Dr. Hilliard    Disposition  Inpatient being treated for orbital cellulitis    Quality Measures  Radiology images reviewed, Labs reviewed and Medications reviewed  Castellanos catheter: No Castellanos      DVT Prophylaxis:  Enoxaparin (Lovenox)                  Physical Exam       Filed Vitals:    01/09/17 1600 01/09/17 2000 01/10/17 0400 01/10/17 0800   BP: 114/72 122/65 104/63 118/82   Pulse: 76 87 97 71   Temp: 36.6 °C (97.9 °F) 37.1 °C (98.8 °F) 36.6 °C (97.9 °F) 36.8 °C (98.2 °F)   TempSrc:       Resp: 14 16 16 16   Height:       Weight:       SpO2: 96% 99% 98% 95%     Body mass index is 27.78 kg/(m^2).    Oxygen Therapy:  Pulse Oximetry: 95 %, O2 (LPM): 0, O2 Delivery: None (Room Air)    Physical Exam   Constitutional: She is oriented to person, place, and time and well-developed, well-nourished, and in no distress. No distress.   HENT:   Head: Normocephalic and atraumatic.   Eyes: Right eye exhibits no discharge. Left eye exhibits no discharge. Right conjunctiva is not injected. Right conjunctiva has no hemorrhage. Left conjunctiva has no hemorrhage. No scleral icterus. Right eye exhibits normal extraocular motion and no nystagmus. Left eye exhibits normal extraocular motion and no nystagmus. Right pupil is round and reactive. Left pupil is round and reactive.       EOM better on left now, RT wnl   Neck: Normal range of motion. Neck supple. No thyromegaly present.   Cardiovascular: Regular rhythm and normal heart sounds.    No murmur heard.  Pulmonary/Chest: Effort normal and breath sounds normal.   Abdominal: Soft. Bowel sounds are normal.   Neurological: She is alert and oriented to person, place, and time.   More awake than before.    Skin: She is not diaphoretic.   Psychiatric: Affect normal.   Vitals reviewed.        Lab Data Review:      1/4/2017  5:49 PM    Recent Labs      01/08/17   0348  01/09/17   0217  01/10/17   0321   SODIUM  137  138  135   POTASSIUM  3.7  3.6  3.9   CHLORIDE  103  105  101   CO2  25  25  27   BUN  8  7*  8   CREATININE  0.43*  0.39*  0.40*   MAGNESIUM  1.8  1.8   --    CALCIUM  9.2  9.3  9.1       Recent Labs      01/08/17   0348  01/09/17   0217  01/10/17   0321   GLUCOSE  99  104*  96        Recent Labs      01/08/17 0348 01/09/17 0217  01/09/17   0830  01/10/17   0322   RBC  4.27  4.33   --   4.18*   HEMOGLOBIN  11.6*  11.9*   --   11.9*   HEMATOCRIT  36.2*  36.7*   --   35.8*   PLATELETCT  295  354   --   383   PROTHROMBTM   --    --   13.3   --    INR   --    --   0.98   --    IRON   --    --   27*   --    FERRITIN   --    --   55.7   --    TOTIRONBC   --    --   344   --        Recent Labs      01/08/17 0348 01/09/17   0217  01/10/17   0322   WBC  4.5*  6.1  7.1   NEUTSPOLYS  51.80  47.30  51.60   LYMPHOCYTES  40.30  38.50  35.30   MONOCYTES  5.30  8.60  7.30   EOSINOPHILS  1.70  4.40  4.10   BASOPHILS  0.90  0.70  0.70           Assessment/Plan     Cellulitis of left orbit  Assessment:  -Left upper face swelling/erythema involving upper eyelid and eye  - On admission: acute onset x 2 days after pimple appeared on the left side of forehead; progressively involved the left eyelid with swelling and unable to open the eye    - on admission: grossly intact extra-ocular muscles movement; with normal vision 20/23 with hand-held snellen chart    - on admission: WBC count: 17.6, VS: /76, pulse 83, temp 36.5 °C (97.7 °F), SpO2 100 %. RA   - tonometry in ED: 12 RE, 24 LE  - CT orbits: orbital cellulitis  - 1/4/17: worsening swelling of eyelid; EOM restricted (partly due to worsening chemosis), Vision intact 20/30, light reflex normal; ophthalmology on board; erythema has reduced and regressed from the initial marking; also purulent substance from the pustule, sent for culture.    - Ophthalmology following closely: started patient on tobramycin-dexamethasone drops q2hrs  - MR head w/wo: Impression: mild orbital and periorbital cellulitis, mild proptosis, no abscess, no evidence of cavernous sinus thrombosis.   - continued on vancomycin and d/c ceftriaxone given staph aureus; MRSA + in both wound and blood  - 1/8: significant improvement in the left eye, able to open more, swelling  significantly decreased; chemosis decreased.  > PLAN:   - continue Vancomycin per ID recs  - ECHO: wnl, EF: 65%, no valvular vegetation  -  repeat BCx 1/5: no growth  - 14 days IV vancomycin from 1/5,  - eye exam improved  - tylenol prn for pain and fever.  - PICC line placed 1/10; will plan on d/c to SNF for antibiotic infusion  - ctm         Hyperthyroidism  - TSH: 0.280  - Free T-4: 2.12  - no physical symptoms concerning hyperthyroidism; except HR >100, however, patient has fever with infection   - will need further workup once acute complaint is more stable.   - consider TSI/ US   - repeat TSH wnl; likely due to acute infectious process; will monitor.     Elevated liver enzymes  - 1/6 liver enzymes: AST: 53  ALT: 80 -->AST: 46, ALT: 74; trending down  - ?possibly from Ceftriaxone  - will monitor; pt asymptomatic    Bacteremia due to Staphylococcus  Blood and wound CX positive for MRSA  - ?from infected wound; pt denies IV drug use, UDS + for stimulants  - continue vancomycin for now; will need for around 2 weeks from day culture negative (1/5) per ID recs  - ECHO: normal; EF 65%, no valvular vegetations  - ctm     Anemia  Hb: on admission 14.1; 1/6/17: 10.9,  -MCV: 90-->84 (?iron losses); Hb: 12.1(1/7)--Improving  - pt reported heavy menstrual cycle this time  - also has active infection; likely inflammatory process causing drop in Hb  - will monitor; and if drop further; will consider full anemia workup

## 2017-01-11 NOTE — PROGRESS NOTES
PICC Insertion Procedure Note    PICC needed for longterm antibiotics. Patient anxious, explanation and reassurance provided. Consent confirmed, vessel patency confirmed with ultrasound. Risks and benefits of procedure explained to patient and education regarding central line associated bloodstream infections provided. Questions answered.     PICC placed in RUE per MD order with ultrasound guidance. 4 Fr, single lumen PICC placed in basilic vein after 1 attempt(s). 2 cc's of 1% lidocaine injected intradermally, 21 gauge microintroducer needle and modified Seldinger technique used. 35 cm total catheter length, 1 cm external measurement inserted with good blood return. Each lumen flushed without resistance with 10 mL 0.9% normal saline. PICC line secured with Statlock, Biopatch and Tegaderm.    PICC tip location in the SVC confirmed by chest xray. Pt tolerated procedure quite well.  Patient condition relayed to unit RN or ordering physician via this post procedure note in the EMR.     [employer] Power PICC ref # [id number], Lot # [id number]

## 2017-01-11 NOTE — PROGRESS NOTES
"Pharmacy Kinetics 31 y.o. female on vancomycin day # 9 of 16 2017    Currently on Vancomycin 1200 mg iv q8hr     Indication for Treatment: facial cellulitis    Pertinent history per medical record: Admitted on 1/3/2017 for facial cellulitis.  Patient with left forehead swelling above eye.  She thought it was a pimple, but today is much larger and painful.  CT orbits with possible cellulitis.  Pt seen by ID today and plan for IV vancomycin x 14 days from negative cultures.   is last negative culture, so stop date of .      Other antibiotics: none    Allergies: Amoxicillin and Penicillins     List concerns for renal function: transaminitis    Pertinent cultures to date:    17: blood peripheral - no growth to date   17: blood (peripheral) NEG x2  17: wound (head) POS - methicillin resistant staphylococcus aureus, heavy growth  1/3/17: blood (peripheral) POS - gram positive cocci, possible staphylococcus sp. x2  17: blood (peripheral) NGTD  17: blood (peripheral) NGTD    Recent Labs      17   0217  01/10/17   0322  17   0249   WBC  6.1  7.1  6.7   NEUTSPOLYS  47.30  51.60  44.70     Recent Labs      17   0217  01/10/17   0321  17   0249   BUN  7*  8  9   CREATININE  0.39*  0.40*  0.45*     Recent Labs      17   1515   VANCOTROUGH  15.8   No intake or output data in the 24 hours ending 17 1525   Blood pressure 88/55, pulse 78, temperature 36.4 °C (97.5 °F), temperature source Temporal, resp. rate 17, height 1.575 m (5' 2.01\"), weight 68.9 kg (151 lb 14.4 oz), last menstrual period 2011, SpO2 97 %. Temp (24hrs), Av.7 °C (98.1 °F), Min:36.4 °C (97.5 °F), Max:37 °C (98.6 °F)      A/P   1. Vancomycin dose change: not indicated  2. Next vancomycin level: 3-4 days, will order later  3. Goal trough: 12-16 mcg/mL  4. Comments: I/O data incomplete.  Renal labs ok. Newer cultures NGTD.  Monitor.    D Richar, PharmD, BCPS    "

## 2017-01-11 NOTE — PROGRESS NOTES
"Pharmacy Kinetics 31 y.o. female on vancomycin day # 8 of 16  1/10/2017    Currently on Vancomycin 1200 mg iv q8hr     Indication for Treatment: facial cellulitis    Pertinent history per medical record: Admitted on 1/3/2017 for facial cellulitis.  Patient with left forehead swelling above eye.  She thought it was a pimple, but today is much larger and painful.  CT orbits with possible cellulitis.  Pt seen by ID today and plan for IV vancomycin x 14 days from negative cultures.   is last negative culture, so stop date of .    Other antibiotics: none    Allergies: Amoxicillin and Penicillins     List concerns for renal function: transaminitis    Pertinent cultures to date:    17: blood peripheral - no growth to date   17: blood (peripheral) NEG x2  17: wound (head) POS - methicillin resistant staphylococcus aureus, heavy growth  1/3/17: blood (peripheral) POS - gram positive cocci, possible staphylococcus sp. x2    Recent Labs      17   0348  17   0217  01/10/17   0322   WBC  4.5*  6.1  7.1   NEUTSPOLYS  51.80  47.30  51.60     Recent Labs      17   0348  17   0217  01/10/17   0321   BUN  8  7*  8   CREATININE  0.43*  0.39*  0.40*     Recent Labs      17   1515   VANCOTROUGH  15.8     Intake/Output Summary (Last 24 hours) at 01/10/17 1659  Last data filed at 01/10/17 0000   Gross per 24 hour   Intake   1740 ml   Output      0 ml   Net   1740 ml      Blood pressure 118/82, pulse 71, temperature 36.8 °C (98.2 °F), temperature source Temporal, resp. rate 16, height 1.575 m (5' 2.01\"), weight 68.9 kg (151 lb 14.4 oz), last menstrual period 2011, SpO2 95 %. Temp (24hrs), Av.8 °C (98.3 °F), Min:36.6 °C (97.9 °F), Max:37.1 °C (98.8 °F)      A/P   1. Vancomycin dose change: continue current dosing  2. Next vancomycin level: 4-5 days  3. Goal trough: 12-16 mcg/mL  4. Comments: Trough yesterday within goal range. PICC placed today for long term antibiotics. Plan is " 14 days from first negative culture (1/4/17), anticipated stop date of 1/18/17.    Nataliya Stephens,PharmD

## 2017-01-11 NOTE — CARE PLAN
Problem: Safety  Goal: Will remain free from falls  Outcome: PROGRESSING AS EXPECTED  Call light within reach.     Problem: Knowledge Deficit  Goal: Knowledge of disease process/condition, treatment plan, diagnostic tests, and medications will improve  Educated patient on treatment plan.

## 2017-01-12 LAB
ANION GAP SERPL CALC-SCNC: 8 MMOL/L (ref 0–11.9)
BASOPHILS # BLD AUTO: 0.8 % (ref 0–1.8)
BASOPHILS # BLD: 0.06 K/UL (ref 0–0.12)
BUN SERPL-MCNC: 12 MG/DL (ref 8–22)
CALCIUM SERPL-MCNC: 8.9 MG/DL (ref 8.5–10.5)
CHLORIDE SERPL-SCNC: 101 MMOL/L (ref 96–112)
CO2 SERPL-SCNC: 26 MMOL/L (ref 20–33)
CREAT SERPL-MCNC: 0.5 MG/DL (ref 0.5–1.4)
EOSINOPHIL # BLD AUTO: 0.29 K/UL (ref 0–0.51)
EOSINOPHIL NFR BLD: 4.1 % (ref 0–6.9)
ERYTHROCYTE [DISTWIDTH] IN BLOOD BY AUTOMATED COUNT: 40.8 FL (ref 35.9–50)
GFR SERPL CREATININE-BSD FRML MDRD: >60 ML/MIN/1.73 M 2
GLUCOSE SERPL-MCNC: 101 MG/DL (ref 65–99)
HCT VFR BLD AUTO: 35.9 % (ref 37–47)
HGB BLD-MCNC: 11.9 G/DL (ref 12–16)
IMM GRANULOCYTES # BLD AUTO: 0.14 K/UL (ref 0–0.11)
IMM GRANULOCYTES NFR BLD AUTO: 2 % (ref 0–0.9)
LYMPHOCYTES # BLD AUTO: 2.38 K/UL (ref 1–4.8)
LYMPHOCYTES NFR BLD: 33.6 % (ref 22–41)
MCH RBC QN AUTO: 28.7 PG (ref 27–33)
MCHC RBC AUTO-ENTMCNC: 33.1 G/DL (ref 33.6–35)
MCV RBC AUTO: 86.7 FL (ref 81.4–97.8)
MONOCYTES # BLD AUTO: 0.55 K/UL (ref 0–0.85)
MONOCYTES NFR BLD AUTO: 7.8 % (ref 0–13.4)
NEUTROPHILS # BLD AUTO: 3.66 K/UL (ref 2–7.15)
NEUTROPHILS NFR BLD: 51.7 % (ref 44–72)
NRBC # BLD AUTO: 0 K/UL
NRBC BLD AUTO-RTO: 0 /100 WBC
PLATELET # BLD AUTO: 320 K/UL (ref 164–446)
PMV BLD AUTO: 8.5 FL (ref 9–12.9)
POTASSIUM SERPL-SCNC: 4 MMOL/L (ref 3.6–5.5)
RBC # BLD AUTO: 4.14 M/UL (ref 4.2–5.4)
SODIUM SERPL-SCNC: 135 MMOL/L (ref 135–145)
WBC # BLD AUTO: 7.1 K/UL (ref 4.8–10.8)

## 2017-01-12 PROCEDURE — A9270 NON-COVERED ITEM OR SERVICE: HCPCS | Performed by: HOSPITALIST

## 2017-01-12 PROCEDURE — 99232 SBSQ HOSP IP/OBS MODERATE 35: CPT | Mod: GC | Performed by: INTERNAL MEDICINE

## 2017-01-12 PROCEDURE — 700105 HCHG RX REV CODE 258

## 2017-01-12 PROCEDURE — 700102 HCHG RX REV CODE 250 W/ 637 OVERRIDE(OP): Performed by: HOSPITALIST

## 2017-01-12 PROCEDURE — 80048 BASIC METABOLIC PNL TOTAL CA: CPT

## 2017-01-12 PROCEDURE — 700111 HCHG RX REV CODE 636 W/ 250 OVERRIDE (IP)

## 2017-01-12 PROCEDURE — 700102 HCHG RX REV CODE 250 W/ 637 OVERRIDE(OP): Performed by: EMERGENCY MEDICINE

## 2017-01-12 PROCEDURE — A9270 NON-COVERED ITEM OR SERVICE: HCPCS | Performed by: INTERNAL MEDICINE

## 2017-01-12 PROCEDURE — 700102 HCHG RX REV CODE 250 W/ 637 OVERRIDE(OP): Performed by: INTERNAL MEDICINE

## 2017-01-12 PROCEDURE — 85025 COMPLETE CBC W/AUTO DIFF WBC: CPT

## 2017-01-12 PROCEDURE — 770021 HCHG ROOM/CARE - ISO PRIVATE

## 2017-01-12 PROCEDURE — A9270 NON-COVERED ITEM OR SERVICE: HCPCS | Performed by: EMERGENCY MEDICINE

## 2017-01-12 RX ADMIN — TOBRAMYCIN AND DEXAMETHASONE 1 DROP: 3; 1 SUSPENSION/ DROPS OPHTHALMIC at 06:04

## 2017-01-12 RX ADMIN — Medication 400 MG: at 19:57

## 2017-01-12 RX ADMIN — Medication 1 TABLET: at 19:57

## 2017-01-12 RX ADMIN — TOBRAMYCIN AND DEXAMETHASONE 1 DROP: 3; 1 SUSPENSION/ DROPS OPHTHALMIC at 19:58

## 2017-01-12 RX ADMIN — TOBRAMYCIN AND DEXAMETHASONE 1 DROP: 3; 1 SUSPENSION/ DROPS OPHTHALMIC at 14:00

## 2017-01-12 RX ADMIN — TOBRAMYCIN AND DEXAMETHASONE 1 DROP: 3; 1 SUSPENSION/ DROPS OPHTHALMIC at 12:00

## 2017-01-12 RX ADMIN — TOBRAMYCIN AND DEXAMETHASONE 1 DROP: 3; 1 SUSPENSION/ DROPS OPHTHALMIC at 09:18

## 2017-01-12 RX ADMIN — HYDROCODONE BITARTRATE AND ACETAMINOPHEN 2 TABLET: 5; 325 TABLET ORAL at 19:57

## 2017-01-12 RX ADMIN — TOBRAMYCIN AND DEXAMETHASONE 1 DROP: 3; 1 SUSPENSION/ DROPS OPHTHALMIC at 18:00

## 2017-01-12 RX ADMIN — TOBRAMYCIN AND DEXAMETHASONE 1 DROP: 3; 1 SUSPENSION/ DROPS OPHTHALMIC at 15:53

## 2017-01-12 RX ADMIN — VANCOMYCIN HYDROCHLORIDE 1400 MG: 10 INJECTION, POWDER, LYOPHILIZED, FOR SOLUTION INTRAVENOUS at 15:53

## 2017-01-12 RX ADMIN — Medication 1 TABLET: at 21:00

## 2017-01-12 RX ADMIN — Medication 400 MG: at 09:20

## 2017-01-12 RX ADMIN — TOBRAMYCIN AND DEXAMETHASONE 1 DROP: 3; 1 SUSPENSION/ DROPS OPHTHALMIC at 22:07

## 2017-01-12 RX ADMIN — Medication 100 MG: at 09:20

## 2017-01-12 RX ADMIN — TOBRAMYCIN AND DEXAMETHASONE 1 DROP: 3; 1 SUSPENSION/ DROPS OPHTHALMIC at 10:00

## 2017-01-12 RX ADMIN — VANCOMYCIN HYDROCHLORIDE 1400 MG: 10 INJECTION, POWDER, LYOPHILIZED, FOR SOLUTION INTRAVENOUS at 23:46

## 2017-01-12 RX ADMIN — VANCOMYCIN HYDROCHLORIDE 1400 MG: 10 INJECTION, POWDER, LYOPHILIZED, FOR SOLUTION INTRAVENOUS at 09:18

## 2017-01-12 ASSESSMENT — ENCOUNTER SYMPTOMS
HEADACHES: 0
CHILLS: 0
PSYCHIATRIC NEGATIVE: 1
MUSCULOSKELETAL NEGATIVE: 1
GASTROINTESTINAL NEGATIVE: 1
RESPIRATORY NEGATIVE: 1
EYE DISCHARGE: 0
BLURRED VISION: 0
FEVER: 1
CARDIOVASCULAR NEGATIVE: 1
NEUROLOGICAL NEGATIVE: 1
PHOTOPHOBIA: 0
WEIGHT LOSS: 0
DOUBLE VISION: 0
SORE THROAT: 0

## 2017-01-12 ASSESSMENT — PAIN SCALES - GENERAL
PAINLEVEL_OUTOF10: 0

## 2017-01-12 NOTE — PROGRESS NOTES
[facility] Medicine Interval Note    Name Rola Carrillo 1985   Age/Sex 31 y.o. female   MRN [id number]   Code Status Full     After 5PM or if no immediate response to page, please call for cross-coverage  Attending/Team: Douglas/Dr. Apple Call [phone] to page   1st Call - Day Intern (R1):   Dr. Ruiz 2nd Call - Day Sr. Resident (R2/R3):   Dr. Little         Chief complaint/ reason for interval visit (Primary Diagnosis)   Left pre and post septal orbital cellulitis and left forehead    Interval Problem Daily Status Update    - Patient feeling good; Left eye significantly improved;   - no fever  VS stable  - repeat blood cultures negative for MRSA   PICC line placed  - continue vanco per ID  - closely monitor the left eye  - denies vaginal itching/discharge      Active Hospital Problems    Diagnosis   • Hyperthyroidism [E05.90] repeat tsh improved   • Cellulitis of left orbit [H05.012] improving       Review of Systems   Constitutional: Positive for fever. Negative for chills and weight loss.   HENT: Negative for congestion, nosebleeds and sore throat.    Eyes: Positive for pain and redness. Negative for blurred vision, double vision, photophobia and discharge.        Left eyelid able to open better than before  Right eye wnl   Respiratory: Negative.    Cardiovascular: Negative.    Gastrointestinal: Negative.    Genitourinary: Negative.    Musculoskeletal: Negative.    Skin:        Erythema of left upper face and periorbital region has significantly improved.    Neurological: Negative.  Negative for headaches.   Endo/Heme/Allergies: Negative.    Psychiatric/Behavioral: Negative.        Consultants/Specialty  Ophthalmology  Infectious Disease: Dr. Hilliard    Disposition  Inpatient being treated for orbital cellulitis    Quality Measures  Radiology images reviewed, Labs reviewed and Medications reviewed  Castellanos catheter: No Castellanos      DVT Prophylaxis: Enoxaparin  (Lovenox)                  Physical Exam       Filed Vitals:    01/11/17 0400 01/11/17 0831 01/11/17 1554 01/11/17 1602   BP: 95/51 88/55 95/55 112/70   Pulse: 88 78 90 77   Temp: 36.8 °C (98.2 °F) 36.4 °C (97.5 °F) 37.1 °C (98.8 °F)    TempSrc:       Resp: 14 17 17    Height:       Weight:       SpO2: 96% 97% 98%      Body mass index is 27.78 kg/(m^2).    Oxygen Therapy:  Pulse Oximetry: 98 %, O2 (LPM): 0, O2 Delivery: None (Room Air)    Physical Exam   Constitutional: She is oriented to person, place, and time and well-developed, well-nourished, and in no distress. No distress.   HENT:   Head: Normocephalic and atraumatic.   Eyes: Right eye exhibits no discharge. Left eye exhibits no discharge. Right conjunctiva is not injected. Right conjunctiva has no hemorrhage. Left conjunctiva has no hemorrhage. No scleral icterus. Right eye exhibits normal extraocular motion and no nystagmus. Left eye exhibits normal extraocular motion and no nystagmus. Right pupil is round and reactive. Left pupil is round and reactive.       EOM better on left now, RT wnl   Neck: Normal range of motion. Neck supple. No thyromegaly present.   Cardiovascular: Regular rhythm and normal heart sounds.    No murmur heard.  Pulmonary/Chest: Effort normal and breath sounds normal.   Abdominal: Soft. Bowel sounds are normal.   Neurological: She is alert and oriented to person, place, and time.   More awake than before.    Skin: She is not diaphoretic.   Psychiatric: Affect normal.   Vitals reviewed.        Lab Data Review:      1/4/2017  5:49 PM    Recent Labs      01/09/17   0217  01/10/17   0321  01/11/17   0249   SODIUM  138  135  136   POTASSIUM  3.6  3.9  4.2   CHLORIDE  105  101  101   CO2  25  27  29   BUN  7*  8  9   CREATININE  0.39*  0.40*  0.45*   MAGNESIUM  1.8   --   2.1   CALCIUM  9.3  9.1  9.4       Recent Labs      01/09/17   0217  01/10/17   0321  01/11/17   0249   GLUCOSE  104*  96  83       Recent Labs      01/09/17   0217   01/09/17   0830  01/10/17   0322  01/11/17   0249   RBC  4.33   --   4.18*  4.24   HEMOGLOBIN  11.9*   --   11.9*  12.1   HEMATOCRIT  36.7*   --   35.8*  36.4*   PLATELETCT  354   --   383  386   PROTHROMBTM   --   13.3   --    --    INR   --   0.98   --    --    IRON   --   27*   --    --    FERRITIN   --   55.7   --    --    TOTIRONBC   --   344   --    --        Recent Labs      01/09/17   0217  01/10/17   0322  01/11/17   0249   WBC  6.1  7.1  6.7   NEUTSPOLYS  47.30  51.60  44.70   LYMPHOCYTES  38.50  35.30  40.80   MONOCYTES  8.60  7.30  7.80   EOSINOPHILS  4.40  4.10  4.30   BASOPHILS  0.70  0.70  0.60           Assessment/Plan     Cellulitis of left orbit  Assessment:  -Left upper face swelling/erythema involving upper eyelid and eye  - On admission: acute onset x 2 days after pimple appeared on the left side of forehead; progressively involved the left eyelid with swelling and unable to open the eye    - on admission: grossly intact extra-ocular muscles movement; with normal vision 20/23 with hand-held snellen chart    - on admission: WBC count: 17.6, VS: /76, pulse 83, temp 36.5 °C (97.7 °F), SpO2 100 %. RA   - tonometry in ED: 12 RE, 24 LE  - CT orbits: orbital cellulitis  - 1/4/17: worsening swelling of eyelid; EOM restricted (partly due to worsening chemosis), Vision intact 20/30, light reflex normal; ophthalmology on board; erythema has reduced and regressed from the initial marking; also purulent substance from the pustule, sent for culture.    - Ophthalmology following closely: started patient on tobramycin-dexamethasone drops q2hrs  - MR head w/wo: Impression: mild orbital and periorbital cellulitis, mild proptosis, no abscess, no evidence of cavernous sinus thrombosis.   - continued on vancomycin and d/c ceftriaxone given staph aureus; MRSA + in both wound and blood  - 1/8: significant improvement in the left eye, able to open more, swelling significantly decreased; chemosis decreased.  >  PLAN:   - continue Vancomycin per ID recs  - ECHO: wnl, EF: 65%, no valvular vegetation  -  repeat BCx 1/5: no growth  - 14 days IV vancomycin from 1/5,  - eye exam improved  - tylenol prn for pain and fever.  - PICC line placed 1/10;   - 1/11: curbsided Dr. Hilliard; per his recommendation; no change in antibiotic. Continue vancomycin and complete the course.   - ctm         Hyperthyroidism  - TSH: 0.280  - Free T-4: 2.12  - no physical symptoms concerning hyperthyroidism; except HR >100, however, patient has fever with infection   - will need further workup once acute complaint is more stable.   - consider TSI/ US   - repeat TSH wnl; likely due to acute infectious process; will monitor.     Elevated liver enzymes  - 1/6 liver enzymes: AST: 53  ALT: 80 -->AST: 46, ALT: 74; trending down  - ?possibly from Ceftriaxone  - will monitor; pt asymptomatic    Bacteremia due to Staphylococcus  Blood and wound CX positive for MRSA  - ?from infected wound; pt denies IV drug use, UDS + for stimulants  - continue vancomycin for now; will need for around 2 weeks from day culture negative (1/5) per ID recs  - ECHO: normal; EF 65%, no valvular vegetations  - ctm     Anemia  Hb: on admission 14.1; 1/6/17: 10.9,  -MCV: 90-->84 (?iron losses); Hb: 12.1(1/7)--Improving  - pt reported heavy menstrual cycle this time  - also has active infection; likely inflammatory process causing drop in Hb  - will monitor; and if drop further; will consider full anemia workup

## 2017-01-12 NOTE — CARE PLAN
Problem: Safety  Goal: Will remain free from injury  Outcome: PROGRESSING AS EXPECTED  Hourly rounding, call light within reach, nonslip footwear on, bed locked/ placed in lowest position, side rails up x2    Problem: Infection  Goal: Will remain free from infection  Outcome: PROGRESSING AS EXPECTED  Continually assess for s/s infection, administer iv abx per mar, monitor vitals, notify  s/s infx

## 2017-01-12 NOTE — PROGRESS NOTES
"Pharmacy Kinetics 31 y.o. female on vancomycin day # 10 of 16 2017    Currently on Vancomycin 1200 mg iv q8hr     Indication for Treatment: facial cellulitis    Pertinent history per medical record: Admitted on 1/3/2017 for facial cellulitis.  Patient with left forehead swelling above eye.  She thought it was a pimple, but today is much larger and painful.  CT orbits with possible cellulitis.  Pt seen by ID today and plan for IV vancomycin x 14 days from negative cultures.   is last negative culture, so stop date of .      Other antibiotics: none    Allergies: Amoxicillin and Penicillins     List concerns for renal function: transaminitis    Pertinent cultures to date:    17: blood peripheral - no growth to date   17: blood (peripheral) NEG x2  17: wound (head) POS - methicillin resistant staphylococcus aureus, heavy growth  1/3/17: blood (peripheral) POS - gram positive cocci, possible staphylococcus sp. x2  17: blood (peripheral) NGTD  17: blood (peripheral) NGTD    Recent Labs      01/10/17   0322  17   0249  17   0610   WBC  7.1  6.7  7.1   NEUTSPOLYS  51.60  44.70  51.70     Recent Labs      01/10/17   0321  17   0249  17   0610   BUN  8  9  12   CREATININE  0.40*  0.45*  0.50     Recent Labs      17   1515   VANCOTROUGH  15.8     Intake/Output Summary (Last 24 hours) at 17 1337  Last data filed at 17 0500   Gross per 24 hour   Intake   1150 ml   Output      0 ml   Net   1150 ml      Blood pressure 99/60, pulse 95, temperature 36.3 °C (97.4 °F), temperature source Temporal, resp. rate 18, height 1.575 m (5' 2.01\"), weight 68.9 kg (151 lb 14.4 oz), last menstrual period 2011, SpO2 96 %. Temp (24hrs), Av.4 °C (97.6 °F), Min:36.1 °C (97 °F), Max:37.1 °C (98.8 °F)      A/P   1. Vancomycin dose change: not indicated  2. Next vancomycin level: 2016, will order tomorrow  3. Goal trough: 21-16 mcg/mL  4. Comments: I/O data " incomplete.  Renal labs ok, but moving up a little.  No newer cultures.  MD note reviewed.  Monitor.    ANDIE Mcqueen, PharmD, BCPS

## 2017-01-12 NOTE — PROGRESS NOTES
Assumed care at this time. Report received from NIGEL Clark. Pt laying in bed asleep. No acute distress noted, respirations even and unlabored. Contact precautions in place. Side rails up x2, call light within reach, nonslip footwear on, bed locked/ placed in lowest position. Will continue to monitor.

## 2017-01-12 NOTE — DISCHARGE PLANNING
Medical Social Work  Pc from Dr. Ruiz  Patient has a pic line and will be on vancomycin until the 18th.   Due to patient has a possible history of drug abuse can not send her home, most recent drug screen, opiates and amphetamines . Please see if a skilled will take her with pending insurance, if not we will end up keeping patient in hospital till the 18th.  I told him that I skilled usually don't take patients with a history of drug abuse patients, but will verify with my supervisor.      PC to Damian Bell: stated I am correct, told me I can submit a referral but doubts anyone will take due to drug issue and lack of insurance.

## 2017-01-13 LAB
BACTERIA BLD CULT: NORMAL
BACTERIA BLD CULT: NORMAL
SIGNIFICANT IND 70042: NORMAL
SIGNIFICANT IND 70042: NORMAL
SITE SITE: NORMAL
SITE SITE: NORMAL
SOURCE SOURCE: NORMAL
SOURCE SOURCE: NORMAL

## 2017-01-13 PROCEDURE — 700102 HCHG RX REV CODE 250 W/ 637 OVERRIDE(OP): Performed by: HOSPITALIST

## 2017-01-13 PROCEDURE — 700111 HCHG RX REV CODE 636 W/ 250 OVERRIDE (IP)

## 2017-01-13 PROCEDURE — A9270 NON-COVERED ITEM OR SERVICE: HCPCS | Performed by: EMERGENCY MEDICINE

## 2017-01-13 PROCEDURE — A9270 NON-COVERED ITEM OR SERVICE: HCPCS | Performed by: HOSPITALIST

## 2017-01-13 PROCEDURE — 770021 HCHG ROOM/CARE - ISO PRIVATE

## 2017-01-13 PROCEDURE — 99232 SBSQ HOSP IP/OBS MODERATE 35: CPT | Mod: GC | Performed by: INTERNAL MEDICINE

## 2017-01-13 PROCEDURE — A9270 NON-COVERED ITEM OR SERVICE: HCPCS | Performed by: INTERNAL MEDICINE

## 2017-01-13 PROCEDURE — 700105 HCHG RX REV CODE 258

## 2017-01-13 PROCEDURE — 700111 HCHG RX REV CODE 636 W/ 250 OVERRIDE (IP): Performed by: HOSPITALIST

## 2017-01-13 PROCEDURE — 700112 HCHG RX REV CODE 229: Performed by: HOSPITALIST

## 2017-01-13 PROCEDURE — 700102 HCHG RX REV CODE 250 W/ 637 OVERRIDE(OP): Performed by: EMERGENCY MEDICINE

## 2017-01-13 PROCEDURE — 700102 HCHG RX REV CODE 250 W/ 637 OVERRIDE(OP): Performed by: INTERNAL MEDICINE

## 2017-01-13 RX ADMIN — VANCOMYCIN HYDROCHLORIDE 1400 MG: 10 INJECTION, POWDER, LYOPHILIZED, FOR SOLUTION INTRAVENOUS at 17:00

## 2017-01-13 RX ADMIN — Medication 100 MG: at 09:00

## 2017-01-13 RX ADMIN — TOBRAMYCIN AND DEXAMETHASONE 1 DROP: 3; 1 SUSPENSION/ DROPS OPHTHALMIC at 05:37

## 2017-01-13 RX ADMIN — TOBRAMYCIN AND DEXAMETHASONE 1 DROP: 3; 1 SUSPENSION/ DROPS OPHTHALMIC at 17:15

## 2017-01-13 RX ADMIN — VANCOMYCIN HYDROCHLORIDE 1400 MG: 10 INJECTION, POWDER, LYOPHILIZED, FOR SOLUTION INTRAVENOUS at 09:00

## 2017-01-13 RX ADMIN — TOBRAMYCIN AND DEXAMETHASONE 1 DROP: 3; 1 SUSPENSION/ DROPS OPHTHALMIC at 13:37

## 2017-01-13 RX ADMIN — TOBRAMYCIN AND DEXAMETHASONE 1 DROP: 3; 1 SUSPENSION/ DROPS OPHTHALMIC at 21:58

## 2017-01-13 RX ADMIN — TOBRAMYCIN AND DEXAMETHASONE 1 DROP: 3; 1 SUSPENSION/ DROPS OPHTHALMIC at 15:48

## 2017-01-13 RX ADMIN — TOBRAMYCIN AND DEXAMETHASONE 1 DROP: 3; 1 SUSPENSION/ DROPS OPHTHALMIC at 09:38

## 2017-01-13 RX ADMIN — Medication 400 MG: at 20:03

## 2017-01-13 RX ADMIN — TOBRAMYCIN AND DEXAMETHASONE 1 DROP: 3; 1 SUSPENSION/ DROPS OPHTHALMIC at 20:03

## 2017-01-13 RX ADMIN — TOBRAMYCIN AND DEXAMETHASONE 1 DROP: 3; 1 SUSPENSION/ DROPS OPHTHALMIC at 12:00

## 2017-01-13 RX ADMIN — Medication 400 MG: at 09:00

## 2017-01-13 RX ADMIN — VANCOMYCIN HYDROCHLORIDE 1400 MG: 10 INJECTION, POWDER, LYOPHILIZED, FOR SOLUTION INTRAVENOUS at 23:40

## 2017-01-13 RX ADMIN — HYDROCODONE BITARTRATE AND ACETAMINOPHEN 2 TABLET: 5; 325 TABLET ORAL at 17:18

## 2017-01-13 ASSESSMENT — ENCOUNTER SYMPTOMS
GASTROINTESTINAL NEGATIVE: 1
SORE THROAT: 0
NEUROLOGICAL NEGATIVE: 1
EYE DISCHARGE: 0
FEVER: 1
PHOTOPHOBIA: 0
BLURRED VISION: 0
DOUBLE VISION: 0
HEADACHES: 0
CARDIOVASCULAR NEGATIVE: 1
MUSCULOSKELETAL NEGATIVE: 1
PSYCHIATRIC NEGATIVE: 1
CHILLS: 0
RESPIRATORY NEGATIVE: 1
WEIGHT LOSS: 0

## 2017-01-13 ASSESSMENT — PAIN SCALES - GENERAL
PAINLEVEL_OUTOF10: 0
PAINLEVEL_OUTOF10: 5
PAINLEVEL_OUTOF10: 0

## 2017-01-13 NOTE — PROGRESS NOTES
Pt resting with eyes closed at this time; no s/s of discomfort or distress; bed in lowest position; call light within reach; personal belongings within reach;  will continue to monitor and provide care to pt.

## 2017-01-13 NOTE — PROGRESS NOTES
[facility]val [facility]    Name Rola Carrillo 1985   Age/Sex 31 y.o. female   MRN [id number]   Code Status Full     After 5PM or if no immediate response to page, please call for cross-coverage  Attending/Team: Douglas/Dr. Apple Call [phone] to page   1st Call - Day Intern (R1):   Dr. Ruiz 2nd Call - Day Sr. Resident (R2/R3):   Dr. Little         Chief complaint/ reason for interval visit (Primary Diagnosis)   Left pre and post septal orbital cellulitis and left forehead    Interval Problem Daily Status Update    - Patient feeling good; Left eye significantly improved;   - no fever  VS stable  - repeat blood cultures negative for MRSA   PICC line placed  - continue vanco per ID  - closely monitor the left eye  - denies vaginal itching/discharge      Active Hospital Problems    Diagnosis   • Hyperthyroidism [E05.90] repeat tsh improved   • Cellulitis of left orbit [H05.012] improving       Review of Systems   Constitutional: Positive for fever. Negative for chills and weight loss.   HENT: Negative for congestion, nosebleeds and sore throat.    Eyes: Negative for blurred vision, double vision, photophobia and discharge.        Left eyelid able to open better than before  Right eye wnl   Respiratory: Negative.    Cardiovascular: Negative.    Gastrointestinal: Negative.    Genitourinary: Negative.    Musculoskeletal: Negative.    Skin:        Erythema of left upper face and periorbital region has significantly improved.    Neurological: Negative.  Negative for headaches.   Endo/Heme/Allergies: Negative.    Psychiatric/Behavioral: Negative.        Consultants/Specialty  Ophthalmology  Infectious Disease: Dr. Hilliard    Disposition  Inpatient being treated for orbital cellulitis    Quality Measures  Radiology images reviewed, Labs reviewed and Medications reviewed  Castellanos catheter: No Castellanos      DVT Prophylaxis: Enoxaparin (Lovenox)                  Physical  Exam       Filed Vitals:    01/11/17 2000 01/12/17 0400 01/12/17 0754 01/12/17 1606   BP: 113/61 99/50 99/60 107/52   Pulse: 92 90 95 72   Temp: 36.1 °C (97 °F) 36.1 °C (97 °F) 36.3 °C (97.4 °F) 36.4 °C (97.6 °F)   TempSrc:       Resp: 16 16 18 19   Height:       Weight:       SpO2: 97%  96% 97%     Body mass index is 27.78 kg/(m^2).    Oxygen Therapy:  Pulse Oximetry: 97 %, O2 (LPM): 0, O2 Delivery: None (Room Air)    Physical Exam   Constitutional: She is oriented to person, place, and time and well-developed, well-nourished, and in no distress. No distress.   HENT:   Head: Normocephalic and atraumatic.   Eyes: Right eye exhibits no discharge. Left eye exhibits no discharge. Right conjunctiva is not injected. Right conjunctiva has no hemorrhage. Left conjunctiva has no hemorrhage. No scleral icterus. Right eye exhibits normal extraocular motion and no nystagmus. Left eye exhibits normal extraocular motion and no nystagmus. Right pupil is round and reactive. Left pupil is round and reactive.       EOM better on left now, RT wnl   Neck: Normal range of motion. Neck supple. No thyromegaly present.   Cardiovascular: Regular rhythm and normal heart sounds.    No murmur heard.  Pulmonary/Chest: Effort normal and breath sounds normal.   Abdominal: Soft. Bowel sounds are normal.   Neurological: She is alert and oriented to person, place, and time.   More awake than before.    Skin: She is not diaphoretic.   Psychiatric: Affect normal.   Vitals reviewed.        Lab Data Review:      1/4/2017  5:49 PM    Recent Labs      01/10/17   0321 01/11/17   0249 01/12/17   0610   SODIUM  135  136  135   POTASSIUM  3.9  4.2  4.0   CHLORIDE  101  101  101   CO2  27  29  26   BUN  8  9  12   CREATININE  0.40*  0.45*  0.50   MAGNESIUM   --   2.1   --    CALCIUM  9.1  9.4  8.9       Recent Labs      01/10/17   0321 01/11/17   0249  01/12/17   0610   GLUCOSE  96  83  101*       Recent Labs      01/10/17   0322  01/11/17   0249   01/12/17   0610   RBC  4.18*  4.24  4.14*   HEMOGLOBIN  11.9*  12.1  11.9*   HEMATOCRIT  35.8*  36.4*  35.9*   PLATELETCT  383  386  320       Recent Labs      01/10/17   0322  01/11/17   0249  01/12/17   0610   WBC  7.1  6.7  7.1   NEUTSPOLYS  51.60  44.70  51.70   LYMPHOCYTES  35.30  40.80  33.60   MONOCYTES  7.30  7.80  7.80   EOSINOPHILS  4.10  4.30  4.10   BASOPHILS  0.70  0.60  0.80           Assessment/Plan     Cellulitis of left orbit  Assessment:  -Left upper face swelling/erythema involving upper eyelid and eye  - On admission: acute onset x 2 days after pimple appeared on the left side of forehead; progressively involved the left eyelid with swelling and unable to open the eye    - on admission: grossly intact extra-ocular muscles movement; with normal vision 20/23 with hand-held snellen chart    - on admission: WBC count: 17.6, VS: /76, pulse 83, temp 36.5 °C (97.7 °F), SpO2 100 %. RA   - tonometry in ED: 12 RE, 24 LE  - CT orbits: orbital cellulitis  - 1/4/17: worsening swelling of eyelid; EOM restricted (partly due to worsening chemosis), Vision intact 20/30, light reflex normal; ophthalmology on board; erythema has reduced and regressed from the initial marking; also purulent substance from the pustule, sent for culture.    - Ophthalmology following closely: started patient on tobramycin-dexamethasone drops q2hrs  - MR head w/wo: Impression: mild orbital and periorbital cellulitis, mild proptosis, no abscess, no evidence of cavernous sinus thrombosis.   - continued on vancomycin and d/c ceftriaxone given staph aureus; MRSA + in both wound and blood  - 1/8: significant improvement in the left eye, able to open more, swelling significantly decreased; chemosis decreased.  > PLAN:   - continue Vancomycin per ID recs  - ECHO: wnl, EF: 65%, no valvular vegetation  -  repeat BCx 1/5: no growth  - 14 days IV vancomycin from 1/5,  - eye exam improved  - tylenol prn for pain and fever.  - PICC line placed  1/10;   - 1/11: curbsided Dr. Hilliard; per his recommendation; no change in antibiotic. Continue vancomycin and complete the course.   - ctm         Hyperthyroidism  - TSH: 0.280  - Free T-4: 2.12  - no physical symptoms concerning hyperthyroidism; except HR >100, however, patient has fever with infection   - will need further workup once acute complaint is more stable.   - consider TSI/ US   - repeat TSH wnl; likely due to acute infectious process; will monitor.     Elevated liver enzymes  - 1/6 liver enzymes: AST: 53  ALT: 80 -->AST: 46, ALT: 74; trending down  - ?possibly from Ceftriaxone  - will monitor; pt asymptomatic    Bacteremia due to Staphylococcus  Blood and wound CX positive for MRSA  - ?from infected wound; pt denies IV drug use, UDS + for stimulants  - continue vancomycin for now; will need for around 2 weeks from day culture negative (1/5) per ID recs  - ECHO: normal; EF 65%, no valvular vegetations  - ctm     Anemia  Hb: on admission 14.1; 1/6/17: 10.9,  -MCV: 90-->84 (?iron losses); Hb: 12.1(1/7)--Improving  - pt reported heavy menstrual cycle this time  - also has active infection; likely inflammatory process causing drop in Hb  - will monitor; and if drop further; will consider full anemia workup

## 2017-01-13 NOTE — CARE PLAN
Problem: Knowledge Deficit  Goal: Knowledge of disease process/condition, treatment plan, diagnostic tests, and medications will improve  IV abx till 1/18    Problem: Pain Management  Goal: Pain level will decrease to patient’s comfort goal  Denies pain at this time will continue to monitor.

## 2017-01-13 NOTE — DISCHARGE SUMMARY
[facility] [place] [facility] Discharge Summary      Admit Date:  1/3/2017       Discharge Date:  1/19/2017    Service:   R Internal Medicine Blue Team  Attending Physician(s):   Dr. Rojo    Senior Resident(s):   Dr. Little  Patricio Resident(s):   Dr. Cameron       Primary Diagnosis:   Orbital cellulitis  MRSA bacteremia  Secondary Diagnoses:                Active Hospital Problems    Diagnosis   • Bacteremia due to Staphylococcus [R78.81]   • Anemia [D64.9]   • Elevated liver enzymes [R74.8]   • Hyperthyroidism [E05.90]   • Cellulitis of left orbit [H05.012]       Hospital Summary (Brief Narrative):       HPI:    Patient with no known significant past medical history presented with 1 days of swelling of left upper face involving the eyelid with pain 10/10. She noticed a pimple over the forehead about 2 days ago on the left side, then the redness & swelling graddually got worse over the left side of forehead and by yesterday night it had involved the upper eyelid on the right with difficulty opening the eyelid completely. Patient stated that she has never had similar problem & she fine 3 days ago. Denied any fever/ nausea/ vomiting/ headache/ focal neurological deficits/ chest pain/ dyspnea/ abdominal pain in the recent past.  No recent insect bite, injury. No known hx of allergy to maskara / cosmetics; however she endorses to changing cosmetics in recent past. She does not report any vision loss. She takes prn Advil.     Hospital Course:  Patient was admitted with the hospital with the concern of orbital cellulitis. CT orbit showed: left preorbital, supraorbital and infraorbital soft tissues as well as some asymmetric enlargement of the left extraocular muscles and some inflammatory stranding of the intraconal fat most likely representing orbital cellulitis.   Ophthalmology was consulted and she was started on ceftriaxone and vancomycin; zosyn was deferred as she has hx of allergy to penicillins. Ophthalmology  was consulted, started her on eye drops and recommended regular eye exam. Blood cultures were also sent out at the time of admission. Her eye exam continue to worsen the next day and purulent material started draining from the pimple site. She was not able to open the eye lid at all and chemosis was worsening and EOM was completely restricted. Due to concern of Cavernous Sinus thrombosis and orbital abscess, MRI was ordered: LEFT orbital and periorbital cellulitis.  Apparent enlargement of LEFT superior and lateral rectus muscles.  Mild LEFT ocular proptosis.  No gross intraorbital abscess however evaluation is limited given lack of fat saturation.  No evidence for cavernous sinus thrombosis. No acute intracranial abnormality. ID was consulted and culture from the wound and blood grew MRSA, ceftriaxone was discontinued and vancomycin was continued. He eye started showing improvement from 4 days of admission. She was kept in contact isolation due to MRSA. Repeat cultures from 1/6 did not show any growth and as per ID recommendations  vancomycin to be continued for 14 days following cultures negative. By 1/10 patient had a completely normal eye exam, her antibiotics were continued and close watch was kept on her lab to watch for any side effect of antibiotics. Vancomycin IV through PICC continued till 1/19.      Of note: Pt c/o of neck mass, had b/l palpable non tender LN ( posterior cervical region), no s/s of inflammation, skin intact. Pt was afebrile, denies sweating, good appetite, no hepatosplenomegaly, no LN pawan in other regions of the body. ESR: 33 high , LFT: AST: 90, ALT: 205, Alk phos: 112, LDH: 189 wnl,  HIV -ve, RPR: -ve, Hep B Ag: -ve  , US neck 1/18: Bilateral cervical adenopathy. likely reactive to orbital cellulitis, other possibilities: Lymphoma, Pt will f/u with PCP as outpt to make sure that LN.pawan is resolved, otherwise, might need biopsy for definitive dx.   ................  Patient /Hospital  Summary (Details -- Problem Oriented) :          Assessment/Plan      Cellulitis of left orbit  Assessment:  -Left upper face swelling/erythema involving upper eyelid and eye  - On admission: acute onset x 2 days after pimple appeared on the left side of forehead; progressively involved the left eyelid with swelling and unable to open the eye    - on admission: grossly intact extra-ocular muscles movement; with normal vision 20/23 with hand-held snellen chart    - on admission: WBC count: 17.6, VS: /76, pulse 83, temp 36.5 °C (97.7 °F), SpO2 100 %. RA   - tonometry in ED: 12 RE, 24 LE  - CT orbits: orbital cellulitis  - 1/4/17: worsening swelling of eyelid; EOM restricted (partly due to worsening chemosis), Vision intact 20/30, light reflex normal; ophthalmology on board; erythema has reduced and regressed from the initial marking; also purulent substance from the pustule, sent for culture.     - Ophthalmology following closely: started patient on tobramycin-dexamethasone drops q2hrs  - MR head w/wo: Impression: mild orbital and periorbital cellulitis, mild proptosis, no abscess, no evidence of cavernous sinus thrombosis.    - continued on vancomycin and d/c ceftriaxone given staph aureus; MRSA + in both wound and blood  - 1/8: significant improvement in the left eye, able to open completely, swelling resolved; chemosis improved.    PLAN:   - continue Vancomycin per ID recs  - ECHO: wnl, EF: 65%, no valvular vegetation  -  repeat BCx 1/5: no growth  - 14 days IV vancomycin from 1/5 till 1/19  - eye exam improved  - tylenol prn for pain and fever.  - PICC line placed 1/10;    - 1/11: Dr. Hilliard; per his recommendation; no change in antibiotic. Continue vancomycin and complete the course.   - BCx (-) 1/16 -> ABx 2 weeks -> end date 1/19   - ctm      Cervical Lymphadenopathy:  -Pt c/o of neck mass, had b/l palpable non tender LN ( posterior cervical region), no s/s of inflammation, skin intact    -afebrile,  denies sweating, good appetite, no hepatosplenomegaly, no LN pawan in other regions of the body  -ESR: 33 high , LFT: AST: 90, ALT: 205, Alk phos: 112, LDH: 189 wnl,  HIV -ve, RPR: -ve, Hep B Ag: -ve    -US neck 1/18: Bilateral cervical adenopathy.  -CXR 1/10:  Focal ill-defined opacity within the RIGHT upper lung may indicate focal inflammatory process or mass. May consider CT chest?   -likely reactive to orbital cellulitis, other possibilities: Lymphoma  -will f/u with PCP as outpt to make sure that LN.pawan is resolved       Hyperthyroidism  - TSH: 0.280 low ----> 2.3 wnl on 1/9  - Free T-4: 2.12 high  - no physical symptoms concerning hyperthyroidism; except HR >100 st, however, patient has fever with infection    - repeat TSH wnl; likely due to acute infectious process; will monitor.     Elevated liver enzymes  - 1/6 liver enzymes: AST: 53--->90 high,  ALT: 80 -->205 high, Alk phosph: 84-->112 high  -hep B Ag: -ve  , not alcoholic    -US abdomen/liver ordered    - will monitor; pt asymptomatic      Bacteremia due to Staphylococcus  Blood and wound CX positive for MRSA  - ?from infected wound; pt denies IV drug use, UDS + for stimulants  - continue vancomycin for now; will need for around 2 weeks from day culture negative (1/5) per ID recs  - ECHO: normal; EF 65%, no valvular vegetations  - BCx (-) 1/16 -> ABx 2 weeks -> end date 1/19   - ctm     Anemia  Hb: on admission H/H: 14.1/45.8----> 10.8/33 1/16, MCV:  Iron: 27 low, TIBC: 344 wnl, % sat: 8 low  (?iron losses)  - pt reported heavy menstrual cycle this time  - also has active infection; likely inflammatory process causing drop in Hb  - will monitor; and if drop further; will consider full anemia workup            Consultants:     Ophthalmology  Infectious Disease: Dr. Hilliard    Procedures:        PICC Line     Imaging/ Testing:      US-ABDOMEN COMPLETE SURVEY   Final Result      Unremarkable abdominal ultrasound.         US-SOFT TISSUES OF HEAD - NECK    Final Result      Bilateral cervical adenopathy.      DX-CHEST-FOR PICC LINE Perform procedure in:: Other(comment f6 below):   Final Result      1.  RIGHT arm PICC line in place extending superiorly along the RIGHT neck, likely within the jugular vein.  Subsequent radiograph shows repositioning into the superior vena cava.   2.  Focal ill-defined opacity within the RIGHT upper lung may indicate focal inflammatory process or mass.      DX-CHEST-FOR PICC LINE Perform procedure in:: Other(comment f6 below):   Final Result      1.  Interval repositioning of RIGHT arm PICC line with tip now at the lower SVC.   2.  No other significant change from prior exam.         ECHOCARDIOGRAM COMP W/O CONT   Final Result      MR-BRAIN-WITH & W/O   Final Result      1.  LEFT orbital and periorbital cellulitis.  Apparent enlargement of LEFT superior and lateral rectus muscles.  Mild LEFT ocular proptosis.   2.  No gross intraorbital abscess however evaluation is limited given lack of fat saturation.   3.  No evidence for cavernous sinus thrombosis.   4.  No acute intracranial abnormality.      [id number] WITH & W/O   Final Result      1.  Induration of the left preorbital, supraorbital and infraorbital soft tissues as well as some asymmetric enlargement of the left extraocular muscles and some inflammatory stranding of the intraconal fat most likely representing orbital cellulitis.    There is no evidence of post septal fluid collection.      2.  No evidence of paranasal sinus disease.      3.  No evidence of orbital fracture.      ECHOCARDIOGRAM COMP W/O CONT    (Results Pending)   IR-PICC LINE PLACEMENT > AGE 5    (Results Pending)         Discharge Medications:         Medication List      START taking these medications       Instructions    acetaminophen 325 MG Tabs   Last time this was given:  650 mg on 1/19/2017  4:55 AM   Commonly known as:  TYLENOL    Take 2 Tabs by mouth every 6 hours as needed (Mild Pain; (Pain scale  1-3); Temp greater than 100.5 F).   Dose:  650 mg         CONTINUE taking these medications       Instructions    ibuprofen 200 MG Tabs   Commonly known as:  MOTRIN    Take 400 mg by mouth every 6 hours as needed for Mild Pain.   Dose:  400 mg               Disposition:  Home     Diet:   Regular     Activity:   As toleratd     Instructions:      Follow up with PCP for checking cervical LN as well as post-hospital DC f/u    The patient was instructed to return to the ER in the event of worsening symptoms. I have counseled the patient on the importance of compliance and the patient has agreed to proceed with all medical recommendations and follow up plan indicated above.   The patient understands that all medications come with benefits and risks. Risks may include permanent injury or death and these risks can be minimized with close reassessment and monitoring.        Primary Care Provider:   Discharge summary faxed to primary care provider:  Deferred  Copy of discharge summary given to the patient: Deferred    Follow up appointment details :      Follow up with PCP for checking cervical LN as well as post-hospital DC f/u    Pending Studies:        None     Time spent on discharge day patient visit, preparing discharge paperwork and arranging for patient follow up.    Discharge Time (Minutes) :    65 min      Condition on Discharge    ______________________________________________________________________    Interval history/exam for day of discharge:    Review of Systems   Constitutional: Negative for fever, chills and weight loss.   HENT: Negative for congestion, nosebleeds and sore throat.    Eyes: Negative for blurred vision, double vision, photophobia and discharge.         Left eyelid normal  Right eye wnl   Respiratory: Negative.    Cardiovascular: Negative.    Gastrointestinal: Negative.    Genitourinary: Negative.    Musculoskeletal: Negative.    Neurological: Negative.  Negative for headaches.    Endo/Heme/Allergies: Negative.    Psychiatric/Behavioral: Negative    Physical Exam   Constitutional: She is oriented to person, place, and time and well-developed, well-nourished, and in no distress. No distress.   HENT:    Head: Normocephalic and atraumatic.   Eyes: Right eye exhibits no discharge. Left eye exhibits no discharge. Right conjunctiva is not injected. Right conjunctiva has no hemorrhage. Left conjunctiva has no hemorrhage. No scleral icterus. Right eye exhibits normal extraocular motion and no nystagmus. Left eye exhibits normal extraocular motion and no nystagmus. Right pupil is round and reactive. Left pupil is round and reactive.   EOM Intact of both eyes   Neck: Normal range of motion. Neck supple. No thyromegaly present.   B/l posterior cervical LN pawan , mildly tender    Cardiovascular: Regular rhythm and normal heart sounds.     No murmur heard.  Pulmonary/Chest: Effort normal and breath sounds normal.   Abdominal: Soft. Bowel sounds are normal.   Neurological: She is alert and oriented to person, place, and time.   Skin: She is not diaphoretic.   Psychiatric: Affect normal.     Filed Vitals:    01/18/17 1523 01/18/17 2000 01/19/17 0400 01/19/17 0800   BP: 100/55 107/64 109/59 109/74   Pulse: 98 115 96 84   Temp: 37 °C (98.6 °F) 36.3 °C (97.4 °F) 37.7 °C (99.8 °F) 37.2 °C (98.9 °F)   TempSrc:       Resp: 18 18 18 18   Height:       Weight:       SpO2: 98% 97% 96% 98%     Weight/BMI: Body mass index is 27.78 kg/(m^2).  Pulse Oximetry: 98 %, O2 (LPM): 0, O2 Delivery: None (Room Air)        Most Recent Labs:    Lab Results   Component Value Date/Time    WBC 3.8* 01/19/2017 07:25 AM    RBC 3.99* 01/19/2017 07:25 AM    HEMOGLOBIN 11.5* 01/19/2017 07:25 AM    HEMATOCRIT 34.0* 01/19/2017 07:25 AM    MCV 85.2 01/19/2017 07:25 AM    MCH 28.8 01/19/2017 07:25 AM    MCHC 33.8 01/19/2017 07:25 AM    MPV 8.4* 01/19/2017 07:25 AM    NEUTROPHILS-POLYS 47.90 01/19/2017 07:25 AM    LYMPHOCYTES 33.20  01/19/2017 07:25 AM    MONOCYTES 10.80 01/19/2017 07:25 AM    EOSINOPHILS 7.10* 01/19/2017 07:25 AM    BASOPHILS 0.50 01/19/2017 07:25 AM    HYPOCHROMIA 1+ 05/31/2012 07:55 AM    ANISOCYTOSIS 1+ 01/07/2017 01:59 AM      Lab Results   Component Value Date/Time    SODIUM 137 01/19/2017 02:37 AM    POTASSIUM 4.2 01/19/2017 02:37 AM    CHLORIDE 102 01/19/2017 02:37 AM    CO2 25 01/19/2017 02:37 AM    GLUCOSE 99 01/19/2017 02:37 AM    BUN 10 01/19/2017 02:37 AM    CREATININE 0.53 01/19/2017 02:37 AM      Lab Results   Component Value Date/Time    ALT(SGPT) 173* 01/19/2017 02:37 AM    AST(SGOT) 63* 01/19/2017 02:37 AM    ALKALINE PHOSPHATASE 121* 01/19/2017 02:37 AM    TOTAL BILIRUBIN 0.3 01/19/2017 02:37 AM    ALBUMIN 3.5 01/19/2017 02:37 AM    GLOBULIN 3.3 01/19/2017 02:37 AM    INR 0.98 01/09/2017 08:30 AM     Lab Results   Component Value Date/Time    PT 13.3 01/09/2017 08:30 AM    INR 0.98 01/09/2017 08:30 AM

## 2017-01-13 NOTE — CARE PLAN
Problem: Safety  Goal: Will remain free from injury  Outcome: PROGRESSING AS EXPECTED  Pt being kept safe and injury free; bed in lowest position; call light within reach; all personal belongings within reach; frequent checks are being provided; non-skid socks are on both feet; will continue to monitor and provide care to pt.

## 2017-01-13 NOTE — PROGRESS NOTES
Recd report, assumed care. Pt A&O*4, denies pain. VSS. Assessment complete. No family at bedside. All needs met. Fall precautions in place, call light in reach, white board updated, hourly rounding in place. Will continue to monitor.

## 2017-01-14 LAB
ANION GAP SERPL CALC-SCNC: 6 MMOL/L (ref 0–11.9)
BASOPHILS # BLD AUTO: 0.4 % (ref 0–1.8)
BASOPHILS # BLD: 0.03 K/UL (ref 0–0.12)
BUN SERPL-MCNC: 14 MG/DL (ref 8–22)
CALCIUM SERPL-MCNC: 9 MG/DL (ref 8.5–10.5)
CHLORIDE SERPL-SCNC: 102 MMOL/L (ref 96–112)
CO2 SERPL-SCNC: 26 MMOL/L (ref 20–33)
CREAT SERPL-MCNC: 0.49 MG/DL (ref 0.5–1.4)
EOSINOPHIL # BLD AUTO: 0.24 K/UL (ref 0–0.51)
EOSINOPHIL NFR BLD: 3.1 % (ref 0–6.9)
ERYTHROCYTE [DISTWIDTH] IN BLOOD BY AUTOMATED COUNT: 40.1 FL (ref 35.9–50)
GFR SERPL CREATININE-BSD FRML MDRD: >60 ML/MIN/1.73 M 2
GLUCOSE SERPL-MCNC: 92 MG/DL (ref 65–99)
HCT VFR BLD AUTO: 34.3 % (ref 37–47)
HGB BLD-MCNC: 10.9 G/DL (ref 12–16)
IMM GRANULOCYTES # BLD AUTO: 0.13 K/UL (ref 0–0.11)
IMM GRANULOCYTES NFR BLD AUTO: 1.7 % (ref 0–0.9)
LYMPHOCYTES # BLD AUTO: 2.05 K/UL (ref 1–4.8)
LYMPHOCYTES NFR BLD: 26.8 % (ref 22–41)
MCH RBC QN AUTO: 27.5 PG (ref 27–33)
MCHC RBC AUTO-ENTMCNC: 31.8 G/DL (ref 33.6–35)
MCV RBC AUTO: 86.4 FL (ref 81.4–97.8)
MONOCYTES # BLD AUTO: 0.67 K/UL (ref 0–0.85)
MONOCYTES NFR BLD AUTO: 8.8 % (ref 0–13.4)
NEUTROPHILS # BLD AUTO: 4.52 K/UL (ref 2–7.15)
NEUTROPHILS NFR BLD: 59.2 % (ref 44–72)
NRBC # BLD AUTO: 0 K/UL
NRBC BLD AUTO-RTO: 0 /100 WBC
PLATELET # BLD AUTO: 382 K/UL (ref 164–446)
PMV BLD AUTO: 8.6 FL (ref 9–12.9)
POTASSIUM SERPL-SCNC: 4.1 MMOL/L (ref 3.6–5.5)
RBC # BLD AUTO: 3.97 M/UL (ref 4.2–5.4)
SODIUM SERPL-SCNC: 134 MMOL/L (ref 135–145)
WBC # BLD AUTO: 7.6 K/UL (ref 4.8–10.8)

## 2017-01-14 PROCEDURE — 700105 HCHG RX REV CODE 258

## 2017-01-14 PROCEDURE — 700102 HCHG RX REV CODE 250 W/ 637 OVERRIDE(OP): Performed by: INTERNAL MEDICINE

## 2017-01-14 PROCEDURE — A9270 NON-COVERED ITEM OR SERVICE: HCPCS | Performed by: HOSPITALIST

## 2017-01-14 PROCEDURE — 770021 HCHG ROOM/CARE - ISO PRIVATE

## 2017-01-14 PROCEDURE — 85025 COMPLETE CBC W/AUTO DIFF WBC: CPT

## 2017-01-14 PROCEDURE — 700102 HCHG RX REV CODE 250 W/ 637 OVERRIDE(OP): Performed by: EMERGENCY MEDICINE

## 2017-01-14 PROCEDURE — 80048 BASIC METABOLIC PNL TOTAL CA: CPT

## 2017-01-14 PROCEDURE — 700102 HCHG RX REV CODE 250 W/ 637 OVERRIDE(OP): Performed by: HOSPITALIST

## 2017-01-14 PROCEDURE — 700111 HCHG RX REV CODE 636 W/ 250 OVERRIDE (IP)

## 2017-01-14 PROCEDURE — A9270 NON-COVERED ITEM OR SERVICE: HCPCS | Performed by: INTERNAL MEDICINE

## 2017-01-14 PROCEDURE — 99232 SBSQ HOSP IP/OBS MODERATE 35: CPT | Mod: GC | Performed by: INTERNAL MEDICINE

## 2017-01-14 PROCEDURE — A9270 NON-COVERED ITEM OR SERVICE: HCPCS | Performed by: EMERGENCY MEDICINE

## 2017-01-14 RX ADMIN — Medication 400 MG: at 08:35

## 2017-01-14 RX ADMIN — Medication 400 MG: at 20:12

## 2017-01-14 RX ADMIN — TOBRAMYCIN AND DEXAMETHASONE 1 DROP: 3; 1 SUSPENSION/ DROPS OPHTHALMIC at 16:26

## 2017-01-14 RX ADMIN — VANCOMYCIN HYDROCHLORIDE 1400 MG: 10 INJECTION, POWDER, LYOPHILIZED, FOR SOLUTION INTRAVENOUS at 16:26

## 2017-01-14 RX ADMIN — TOBRAMYCIN AND DEXAMETHASONE 1 DROP: 3; 1 SUSPENSION/ DROPS OPHTHALMIC at 20:13

## 2017-01-14 RX ADMIN — Medication 100 MG: at 08:35

## 2017-01-14 RX ADMIN — TOBRAMYCIN AND DEXAMETHASONE 1 DROP: 3; 1 SUSPENSION/ DROPS OPHTHALMIC at 14:26

## 2017-01-14 RX ADMIN — TOBRAMYCIN AND DEXAMETHASONE 1 DROP: 3; 1 SUSPENSION/ DROPS OPHTHALMIC at 05:37

## 2017-01-14 RX ADMIN — HYDROCODONE BITARTRATE AND ACETAMINOPHEN 2 TABLET: 5; 325 TABLET ORAL at 04:36

## 2017-01-14 RX ADMIN — HYDROCODONE BITARTRATE AND ACETAMINOPHEN 2 TABLET: 5; 325 TABLET ORAL at 21:52

## 2017-01-14 RX ADMIN — TOBRAMYCIN AND DEXAMETHASONE 1 DROP: 3; 1 SUSPENSION/ DROPS OPHTHALMIC at 12:25

## 2017-01-14 RX ADMIN — TOBRAMYCIN AND DEXAMETHASONE 1 DROP: 3; 1 SUSPENSION/ DROPS OPHTHALMIC at 18:11

## 2017-01-14 RX ADMIN — TOBRAMYCIN AND DEXAMETHASONE 1 DROP: 3; 1 SUSPENSION/ DROPS OPHTHALMIC at 10:28

## 2017-01-14 RX ADMIN — TOBRAMYCIN AND DEXAMETHASONE 1 DROP: 3; 1 SUSPENSION/ DROPS OPHTHALMIC at 08:30

## 2017-01-14 RX ADMIN — VANCOMYCIN HYDROCHLORIDE 1400 MG: 10 INJECTION, POWDER, LYOPHILIZED, FOR SOLUTION INTRAVENOUS at 08:29

## 2017-01-14 ASSESSMENT — ENCOUNTER SYMPTOMS
CHILLS: 0
DOUBLE VISION: 0
RESPIRATORY NEGATIVE: 1
EYE DISCHARGE: 0
WEIGHT LOSS: 0
MUSCULOSKELETAL NEGATIVE: 1
SORE THROAT: 0
PSYCHIATRIC NEGATIVE: 1
HEADACHES: 0
PHOTOPHOBIA: 0
CARDIOVASCULAR NEGATIVE: 1
FEVER: 1
GASTROINTESTINAL NEGATIVE: 1
NEUROLOGICAL NEGATIVE: 1
BLURRED VISION: 0

## 2017-01-14 ASSESSMENT — PAIN SCALES - GENERAL
PAINLEVEL_OUTOF10: 0
PAINLEVEL_OUTOF10: 6
PAINLEVEL_OUTOF10: 0
PAINLEVEL_OUTOF10: 3
PAINLEVEL_OUTOF10: 7

## 2017-01-14 NOTE — CARE PLAN
Problem: Infection  Goal: Will remain free from infection  Outcome: PROGRESSING AS EXPECTED  Pt has cellulitis of left, +MRSA, IV abx given, contact isolation in place, ROSALIA PICC line in place, no s/s of infection, WBC at 7.6 this AM    Problem: Discharge Barriers/Planning  Goal: Patient’s continuum of care needs will be met  Outcome: PROGRESSING AS EXPECTED  Pt likely to DC home once IV abx therapy completed

## 2017-01-14 NOTE — PROGRESS NOTES
"Pharmacy Kinetics 31 y.o. female on vancomycin day # 11 2017    Currently on Vancomycin 1200 mg iv q8hr (00, 08, 16)    Indication for Treatment: facial cellulitis    Pertinent history per medical record: Admitted on 1/3/2017 for facial cellulitis.  Patient with left forehead swelling above eye.  She thought it was a pimple, but today is much larger and painful.  CT orbits with possible cellulitis.  Pt seen by ID today and plan for IV vancomycin x 14 days from negative cultures.   is last negative culture, so stop date of .      Other antibiotics: none    Allergies: Amoxicillin and Penicillins     List concerns for renal function: transaminitis    Pertinent cultures to date:    17: blood peripheral - no growth to date   17: blood (peripheral) NEG x2  17: wound (head) POS - methicillin resistant staphylococcus aureus, heavy growth  1/3/17: blood (peripheral) POS - gram positive cocci, possible staphylococcus sp. x2  17: blood (peripheral) NGTD  17: blood (peripheral) NGTD    Recent Labs      17   0249  17   0610   WBC  6.7  7.1   NEUTSPOLYS  44.70  51.70     Recent Labs      17   0249  17   0610   BUN  9  12   CREATININE  0.45*  0.50     No results for input(s): VANCOTROUGH, VANCOPEAK, VANCORANDOM in the last 72 hours.  Intake/Output Summary (Last 24 hours) at 17 1601  Last data filed at 17 0000   Gross per 24 hour   Intake    360 ml   Output      0 ml   Net    360 ml      Blood pressure 108/64, pulse 86, temperature 36 °C (96.8 °F), temperature source Temporal, resp. rate 17, height 1.575 m (5' 2.01\"), weight 68.9 kg (151 lb 14.4 oz), last menstrual period 2011, SpO2 95 %. Temp (24hrs), Av.3 °C (97.3 °F), Min:36 °C (96.8 °F), Max:36.7 °C (98 °F)      A/P   1. Vancomycin dose change: not indicated  2. Next vancomycin level: tomorrow @ 1530 (before 1600 dose)  3. Goal trough: 12-16 mcg/mL  4. Comments: I/O data incomplete.  No new labs.  " No new cultures.  Follow up on trough tomorrow.    ANDIE Mcqueen, PharmD, BCPS

## 2017-01-14 NOTE — PROGRESS NOTES
[facility] Internal Medicine Interval Note    Name Rola Carrillo 1985   Age/Sex 31 y.o. female   MRN [id number]   Code Status Full     After 5PM or if no immediate response to page, please call for cross-coverage  Attending/Team: Douglas/Dr. Apple Call [phone] to page   1st Call - Day Intern (R1):   Dr. Ruiz 2nd Call - Day Sr. Resident (R2/R3):   Dr. Little         Chief complaint/ reason for interval visit (Primary Diagnosis)   Left pre and post septal orbital cellulitis and left forehead    Interval Problem Daily Status Update    - Patient feeling good; Left eye significantly improved;   - no fever  VS stable  - repeat blood cultures negative for MRSA   PICC line placed  - continue vanco per ID  - closely monitor the left eye  - denies vaginal itching/discharge      Active Hospital Problems    Diagnosis   • Hyperthyroidism [E05.90] repeat tsh improved   • Cellulitis of left orbit [H05.012] improving       Review of Systems   Constitutional: Positive for fever. Negative for chills and weight loss.   HENT: Negative for congestion, nosebleeds and sore throat.    Eyes: Negative for blurred vision, double vision, photophobia and discharge.        Left eyelid now normal  Right eye wnl   Respiratory: Negative.    Cardiovascular: Negative.    Gastrointestinal: Negative.    Genitourinary: Negative.    Musculoskeletal: Negative.    Neurological: Negative.  Negative for headaches.   Endo/Heme/Allergies: Negative.    Psychiatric/Behavioral: Negative.        Consultants/Specialty  Ophthalmology  Infectious Disease: Dr. Hilliard    Disposition  Inpatient being treated for orbital cellulitis    Quality Measures  Radiology images reviewed, Labs reviewed and Medications reviewed  Castellanos catheter: No Castellanos      DVT Prophylaxis: Enoxaparin (Lovenox)                  Physical Exam       Filed Vitals:    17 2000 17 0400 17 0827 17 1504   BP: 116/65 100/65 114/74 108/64    Pulse: 101 75 98 86   Temp: 36.7 °C (98 °F) 36 °C (96.8 °F) 36.2 °C (97.2 °F) 36 °C (96.8 °F)   TempSrc:       Resp: 18 18 17 17   Height:       Weight:       SpO2: 95% 96% 97% 95%     Body mass index is 27.78 kg/(m^2).    Oxygen Therapy:  Pulse Oximetry: 95 %, O2 (LPM): 0, O2 Delivery: None (Room Air)    Physical Exam   Constitutional: She is oriented to person, place, and time and well-developed, well-nourished, and in no distress. No distress.   HENT:   Head: Normocephalic and atraumatic.   Eyes: Right eye exhibits no discharge. Left eye exhibits no discharge. Right conjunctiva is not injected. Right conjunctiva has no hemorrhage. Left conjunctiva has no hemorrhage. No scleral icterus. Right eye exhibits normal extraocular motion and no nystagmus. Left eye exhibits normal extraocular motion and no nystagmus. Right pupil is round and reactive. Left pupil is round and reactive.       EOM better on left now, RT wnl   Neck: Normal range of motion. Neck supple. No thyromegaly present.   Cardiovascular: Regular rhythm and normal heart sounds.    No murmur heard.  Pulmonary/Chest: Effort normal and breath sounds normal.   Abdominal: Soft. Bowel sounds are normal.   Neurological: She is alert and oriented to person, place, and time.   Skin: She is not diaphoretic.   Psychiatric: Affect normal.   Vitals reviewed.        Lab Data Review:      1/4/2017  5:49 PM    Recent Labs      01/11/17 0249 01/12/17   0610   SODIUM  136  135   POTASSIUM  4.2  4.0   CHLORIDE  101  101   CO2  29  26   BUN  9  12   CREATININE  0.45*  0.50   MAGNESIUM  2.1   --    CALCIUM  9.4  8.9       Recent Labs      01/11/17 0249 01/12/17   0610   GLUCOSE  83  101*       Recent Labs      01/11/17 0249 01/12/17   0610   RBC  4.24  4.14*   HEMOGLOBIN  12.1  11.9*   HEMATOCRIT  36.4*  35.9*   PLATELETCT  386  320       Recent Labs      01/11/17 0249 01/12/17   0610   WBC  6.7  7.1   NEUTSPOLYS  44.70  51.70   LYMPHOCYTES  40.80  33.60    MONOCYTES  7.80  7.80   EOSINOPHILS  4.30  4.10   BASOPHILS  0.60  0.80           Assessment/Plan     Cellulitis of left orbit  Assessment:  -Left upper face swelling/erythema involving upper eyelid and eye  - On admission: acute onset x 2 days after pimple appeared on the left side of forehead; progressively involved the left eyelid with swelling and unable to open the eye    - on admission: grossly intact extra-ocular muscles movement; with normal vision 20/23 with hand-held snellen chart    - on admission: WBC count: 17.6, VS: /76, pulse 83, temp 36.5 °C (97.7 °F), SpO2 100 %. RA   - tonometry in ED: 12 RE, 24 LE  - CT orbits: orbital cellulitis  - 1/4/17: worsening swelling of eyelid; EOM restricted (partly due to worsening chemosis), Vision intact 20/30, light reflex normal; ophthalmology on board; erythema has reduced and regressed from the initial marking; also purulent substance from the pustule, sent for culture.    - Ophthalmology following closely: started patient on tobramycin-dexamethasone drops q2hrs  - MR head w/wo: Impression: mild orbital and periorbital cellulitis, mild proptosis, no abscess, no evidence of cavernous sinus thrombosis.   - continued on vancomycin and d/c ceftriaxone given staph aureus; MRSA + in both wound and blood  - 1/8: significant improvement in the left eye, able to open more, swelling significantly decreased; chemosis decreased.  > PLAN:   - continue Vancomycin per ID recs  - ECHO: wnl, EF: 65%, no valvular vegetation  -  repeat BCx 1/5: no growth  - 14 days IV vancomycin from 1/5,  - eye exam improved  - tylenol prn for pain and fever.  - PICC line placed 1/10;   - 1/11: curbsided Dr. Hilliard; per his recommendation; no change in antibiotic. Continue vancomycin and complete the course.   - ctm         Hyperthyroidism  - TSH: 0.280  - Free T-4: 2.12  - no physical symptoms concerning hyperthyroidism; except HR >100, however, patient has fever with infection   - will  need further workup once acute complaint is more stable.   - consider TSI/ US   - repeat TSH wnl; likely due to acute infectious process; will monitor.     Elevated liver enzymes  - 1/6 liver enzymes: AST: 53  ALT: 80 -->AST: 46, ALT: 74; trending down  - ?possibly from Ceftriaxone  - will monitor; pt asymptomatic    Bacteremia due to Staphylococcus  Blood and wound CX positive for MRSA  - ?from infected wound; pt denies IV drug use, UDS + for stimulants  - continue vancomycin for now; will need for around 2 weeks from day culture negative (1/5) per ID recs  - ECHO: normal; EF 65%, no valvular vegetations  - ctm     Anemia  Hb: on admission 14.1; 1/6/17: 10.9,  -MCV: 90-->84 (?iron losses); Hb: 12.1(1/7)--Improving  - pt reported heavy menstrual cycle this time  - also has active infection; likely inflammatory process causing drop in Hb  - will monitor; and if drop further; will consider full anemia workup

## 2017-01-14 NOTE — PROGRESS NOTES
Pt is AOx4, no complains of pain, given scheduled q2 eyedrops and IV Vancomycin, skin and sacral assessment done, ROSALIA PICC line in place--CDI, call light in use, steady gait noted, ambulatory, instructed to call for assistance, bed locked in low position, plan of care discussed, all needs met at this time. Pt still on contact for MRSA.

## 2017-01-14 NOTE — PROGRESS NOTES
[facility]val [facility]    Name Rola Carrillo 1985   Age/Sex 31 y.o. female   MRN [id number]   Code Status Full     After 5PM or if no immediate response to page, please call for cross-coverage  Attending/Team: Douglas/Dr. Apple Call [phone] to page   1st Call - Day Intern (R1):   Dr. Ruiz 2nd Call - Day Sr. Resident (R2/R3):   Dr. Little         Chief complaint/ reason for interval visit (Primary Diagnosis)   Left pre and post septal orbital cellulitis and left forehead    Interval Problem Daily Status Update    - Patient feeling good; Left eye significantly improved;   - stable &  BCx (-)  -> ABx 2 weeks -> end date    - given clinical stability, no labs on 1/15    Active Hospital Problems    Diagnosis   • Hyperthyroidism [E05.90] repeat tsh improved   • Cellulitis of left orbit [H05.012] improving       Review of Systems   Constitutional: Positive for fever. Negative for chills and weight loss.   HENT: Negative for congestion, nosebleeds and sore throat.    Eyes: Negative for blurred vision, double vision, photophobia and discharge.        Left eyelid now normal  Right eye wnl   Respiratory: Negative.    Cardiovascular: Negative.    Gastrointestinal: Negative.    Genitourinary: Negative.    Musculoskeletal: Negative.    Neurological: Negative.  Negative for headaches.   Endo/Heme/Allergies: Negative.    Psychiatric/Behavioral: Negative.        Consultants/Specialty  Ophthalmology  Infectious Disease: Dr. Hilliard    Disposition  Inpatient being treated for orbital cellulitis    Quality Measures  Radiology images reviewed, Labs reviewed and Medications reviewed  Castellanos catheter: No Castellanos      DVT Prophylaxis: Enoxaparin (Lovenox)                  Physical Exam       Filed Vitals:    17 1504 17 2000 17 0400 17 0843   BP: 108/64 102/49 104/53 104/57   Pulse: 86 90 81 80   Temp: 36 °C (96.8 °F) 36.4 °C (97.6 °F) 37.2 °C (99  °F) 36.2 °C (97.2 °F)   TempSrc:       Resp: 17 18 18 17   Height:       Weight:       SpO2: 95% 98% 96% 98%     Body mass index is 27.78 kg/(m^2).    Oxygen Therapy:  Pulse Oximetry: 98 %, O2 (LPM): 0, O2 Delivery: None (Room Air)    Physical Exam   Constitutional: She is oriented to person, place, and time and well-developed, well-nourished, and in no distress. No distress.   HENT:   Head: Normocephalic and atraumatic.   Eyes: Right eye exhibits no discharge. Left eye exhibits no discharge. Right conjunctiva is not injected. Right conjunctiva has no hemorrhage. Left conjunctiva has no hemorrhage. No scleral icterus. Right eye exhibits normal extraocular motion and no nystagmus. Left eye exhibits normal extraocular motion and no nystagmus. Right pupil is round and reactive. Left pupil is round and reactive.       EOM better on left now, RT wnl   Neck: Normal range of motion. Neck supple. No thyromegaly present.   Cardiovascular: Regular rhythm and normal heart sounds.    No murmur heard.  Pulmonary/Chest: Effort normal and breath sounds normal.   Abdominal: Soft. Bowel sounds are normal.   Neurological: She is alert and oriented to person, place, and time.   Skin: She is not diaphoretic.   Psychiatric: Affect normal.   Vitals reviewed.        Lab Data Review:      1/4/2017  5:49 PM    Recent Labs      01/12/17   0610  01/14/17   0350   SODIUM  135  134*   POTASSIUM  4.0  4.1   CHLORIDE  101  102   CO2  26  26   BUN  12  14   CREATININE  0.50  0.49*   CALCIUM  8.9  9.0       Recent Labs      01/12/17   0610  01/14/17   0350   GLUCOSE  101*  92       Recent Labs      01/12/17   0610  01/14/17   0350   RBC  4.14*  3.97*   HEMOGLOBIN  11.9*  10.9*   HEMATOCRIT  35.9*  34.3*   PLATELETCT  320  382       Recent Labs      01/12/17   0610  01/14/17   0350   WBC  7.1  7.6   NEUTSPOLYS  51.70  59.20   LYMPHOCYTES  33.60  26.80   MONOCYTES  7.80  8.80   EOSINOPHILS  4.10  3.10   BASOPHILS  0.80  0.40           Assessment/Plan      Cellulitis of left orbit  Assessment:  -Left upper face swelling/erythema involving upper eyelid and eye  - On admission: acute onset x 2 days after pimple appeared on the left side of forehead; progressively involved the left eyelid with swelling and unable to open the eye    - on admission: grossly intact extra-ocular muscles movement; with normal vision 20/23 with hand-held snellen chart    - on admission: WBC count: 17.6, VS: /76, pulse 83, temp 36.5 °C (97.7 °F), SpO2 100 %. RA   - tonometry in ED: 12 RE, 24 LE  - CT orbits: orbital cellulitis  - 1/4/17: worsening swelling of eyelid; EOM restricted (partly due to worsening chemosis), Vision intact 20/30, light reflex normal; ophthalmology on board; erythema has reduced and regressed from the initial marking; also purulent substance from the pustule, sent for culture.    - Ophthalmology following closely: started patient on tobramycin-dexamethasone drops q2hrs  - MR head w/wo: Impression: mild orbital and periorbital cellulitis, mild proptosis, no abscess, no evidence of cavernous sinus thrombosis.   - continued on vancomycin and d/c ceftriaxone given staph aureus; MRSA + in both wound and blood  - 1/8: significant improvement in the left eye, able to open more, swelling significantly decreased; chemosis decreased.  > PLAN:   - continue Vancomycin per ID recs  - ECHO: wnl, EF: 65%, no valvular vegetation  -  repeat BCx 1/5: no growth  - 14 days IV vancomycin from 1/5,  - eye exam improved  - tylenol prn for pain and fever.  - PICC line placed 1/10;   - 1/11: curbsided Dr. Hilliard; per his recommendation; no change in antibiotic. Continue vancomycin and complete the course.   - BCx (-) 1/16 -> ABx 2 weeks -> end date 1/19   - ctm         Hyperthyroidism  - TSH: 0.280  - Free T-4: 2.12  - no physical symptoms concerning hyperthyroidism; except HR >100, however, patient has fever with infection   - will need further workup once acute complaint is more  stable.   - consider TSI/ US   - repeat TSH wnl; likely due to acute infectious process; will monitor.     Elevated liver enzymes  - 1/6 liver enzymes: AST: 53  ALT: 80 -->AST: 46, ALT: 74; trending down  - ?possibly from Ceftriaxone  - will monitor; pt asymptomatic    Bacteremia due to Staphylococcus  Blood and wound CX positive for MRSA  - ?from infected wound; pt denies IV drug use, UDS + for stimulants  - continue vancomycin for now; will need for around 2 weeks from day culture negative (1/5) per ID recs  - ECHO: normal; EF 65%, no valvular vegetations  - BCx (-) 1/16 -> ABx 2 weeks -> end date 1/19   - ctm     Anemia  Hb: on admission 14.1; 1/6/17: 10.9,  -MCV: 90-->84 (?iron losses); Hb: 12.1(1/7)--Improving  - pt reported heavy menstrual cycle this time  - also has active infection; likely inflammatory process causing drop in Hb  - will monitor; and if drop further; will consider full anemia workup

## 2017-01-14 NOTE — PROGRESS NOTES
Pt is resting with eyes closed at this time; able to self-ambulate to bathroom; calls for assistance when needed; call light within reach; non-skid socks on both feet; personal belongings within reach; will continue to monitor and provide care to pt.

## 2017-01-15 LAB
APPEARANCE UR: CLEAR
BILIRUB UR QL STRIP.AUTO: NEGATIVE
COLOR UR: COLORLESS
GLUCOSE UR STRIP.AUTO-MCNC: NEGATIVE MG/DL
KETONES UR STRIP.AUTO-MCNC: NEGATIVE MG/DL
LEUKOCYTE ESTERASE UR QL STRIP.AUTO: NEGATIVE
MICRO URNS: NORMAL
NITRITE UR QL STRIP.AUTO: NEGATIVE
PH UR STRIP.AUTO: 8 [PH]
PROT UR QL STRIP: NEGATIVE MG/DL
RBC UR QL AUTO: NEGATIVE
SP GR UR STRIP.AUTO: 1.01
VANCOMYCIN TROUGH SERPL-MCNC: 20.7 UG/ML (ref 10–20)

## 2017-01-15 PROCEDURE — A9270 NON-COVERED ITEM OR SERVICE: HCPCS | Performed by: HOSPITALIST

## 2017-01-15 PROCEDURE — A9270 NON-COVERED ITEM OR SERVICE: HCPCS | Performed by: INTERNAL MEDICINE

## 2017-01-15 PROCEDURE — A9270 NON-COVERED ITEM OR SERVICE: HCPCS | Performed by: EMERGENCY MEDICINE

## 2017-01-15 PROCEDURE — 99232 SBSQ HOSP IP/OBS MODERATE 35: CPT | Mod: GC | Performed by: INTERNAL MEDICINE

## 2017-01-15 PROCEDURE — 700102 HCHG RX REV CODE 250 W/ 637 OVERRIDE(OP): Performed by: HOSPITALIST

## 2017-01-15 PROCEDURE — 700102 HCHG RX REV CODE 250 W/ 637 OVERRIDE(OP): Performed by: INTERNAL MEDICINE

## 2017-01-15 PROCEDURE — 700102 HCHG RX REV CODE 250 W/ 637 OVERRIDE(OP): Performed by: EMERGENCY MEDICINE

## 2017-01-15 PROCEDURE — 700111 HCHG RX REV CODE 636 W/ 250 OVERRIDE (IP)

## 2017-01-15 PROCEDURE — 770021 HCHG ROOM/CARE - ISO PRIVATE

## 2017-01-15 PROCEDURE — 700111 HCHG RX REV CODE 636 W/ 250 OVERRIDE (IP): Performed by: INTERNAL MEDICINE

## 2017-01-15 PROCEDURE — 80202 ASSAY OF VANCOMYCIN: CPT

## 2017-01-15 PROCEDURE — 81003 URINALYSIS AUTO W/O SCOPE: CPT

## 2017-01-15 RX ADMIN — TOBRAMYCIN AND DEXAMETHASONE 1 DROP: 3; 1 SUSPENSION/ DROPS OPHTHALMIC at 09:58

## 2017-01-15 RX ADMIN — TOBRAMYCIN AND DEXAMETHASONE 1 DROP: 3; 1 SUSPENSION/ DROPS OPHTHALMIC at 07:40

## 2017-01-15 RX ADMIN — VANCOMYCIN HYDROCHLORIDE 1100 MG: 10 INJECTION, POWDER, LYOPHILIZED, FOR SOLUTION INTRAVENOUS at 17:11

## 2017-01-15 RX ADMIN — TOBRAMYCIN AND DEXAMETHASONE 1 DROP: 3; 1 SUSPENSION/ DROPS OPHTHALMIC at 14:06

## 2017-01-15 RX ADMIN — HYDROCODONE BITARTRATE AND ACETAMINOPHEN 2 TABLET: 5; 325 TABLET ORAL at 20:51

## 2017-01-15 RX ADMIN — ALTEPLASE 2 MG: 2.2 INJECTION, POWDER, LYOPHILIZED, FOR SOLUTION INTRAVENOUS at 14:05

## 2017-01-15 RX ADMIN — TOBRAMYCIN AND DEXAMETHASONE 1 DROP: 3; 1 SUSPENSION/ DROPS OPHTHALMIC at 05:49

## 2017-01-15 RX ADMIN — Medication 400 MG: at 09:58

## 2017-01-15 RX ADMIN — TOBRAMYCIN AND DEXAMETHASONE 1 DROP: 3; 1 SUSPENSION/ DROPS OPHTHALMIC at 18:11

## 2017-01-15 RX ADMIN — Medication 1 TABLET: at 20:51

## 2017-01-15 RX ADMIN — Medication 400 MG: at 20:51

## 2017-01-15 RX ADMIN — VANCOMYCIN HYDROCHLORIDE 1400 MG: 10 INJECTION, POWDER, LYOPHILIZED, FOR SOLUTION INTRAVENOUS at 00:54

## 2017-01-15 RX ADMIN — TOBRAMYCIN AND DEXAMETHASONE 1 DROP: 3; 1 SUSPENSION/ DROPS OPHTHALMIC at 16:13

## 2017-01-15 RX ADMIN — ACETAMINOPHEN 650 MG: 325 TABLET, FILM COATED ORAL at 17:10

## 2017-01-15 RX ADMIN — TOBRAMYCIN AND DEXAMETHASONE 1 DROP: 3; 1 SUSPENSION/ DROPS OPHTHALMIC at 20:53

## 2017-01-15 RX ADMIN — TOBRAMYCIN AND DEXAMETHASONE 1 DROP: 3; 1 SUSPENSION/ DROPS OPHTHALMIC at 11:50

## 2017-01-15 RX ADMIN — Medication 100 MG: at 09:58

## 2017-01-15 RX ADMIN — VANCOMYCIN HYDROCHLORIDE 1400 MG: 10 INJECTION, POWDER, LYOPHILIZED, FOR SOLUTION INTRAVENOUS at 07:40

## 2017-01-15 ASSESSMENT — ENCOUNTER SYMPTOMS
HEADACHES: 0
DOUBLE VISION: 0
PSYCHIATRIC NEGATIVE: 1
CARDIOVASCULAR NEGATIVE: 1
BLURRED VISION: 0
MUSCULOSKELETAL NEGATIVE: 1
PHOTOPHOBIA: 0
SORE THROAT: 0
FEVER: 1
RESPIRATORY NEGATIVE: 1
EYE DISCHARGE: 0
GASTROINTESTINAL NEGATIVE: 1
NEUROLOGICAL NEGATIVE: 1
CHILLS: 0
WEIGHT LOSS: 0

## 2017-01-15 ASSESSMENT — PAIN SCALES - GENERAL
PAINLEVEL_OUTOF10: 0
PAINLEVEL_OUTOF10: 0
PAINLEVEL_OUTOF10: 4
PAINLEVEL_OUTOF10: 2
PAINLEVEL_OUTOF10: 6

## 2017-01-15 NOTE — PROGRESS NOTES
"Pharmacy Kinetics 31 y.o. female on vancomycin day # 13 1/15/2017    Currently on Vancomycin 1400 mg iv q8hr (0000, 0800, 1600)    Indication for Treatment: facial cellulitis    Pertinent history per medical record: Admitted on 1/3/2017 for facial cellulitis.  Patient with left forehead swelling above eye.  She thought it was a pimple, but became much larger and painful.  CT of orbits with possible cellulitis.  Pt seen by ID and plan for IV vancomycin x 14 days from negative cultures.   is last negative culture, thus stop date is .    Other antibiotics: none    Allergies: Amoxicillin and Penicillins     List concerns for renal function: transaminitis (LFTs last checked )    Pertinent cultures to date:   17: blood peripheral - no growth to date   17: blood (peripheral) - no growth to date x2  17: wound (head) POS - methicillin resistant staphylococcus aureus, heavy growth  1/3/17: blood (peripheral) POS - gram positive cocci, possible staphylococcus sp. x2    Recent Labs      17   0350   WBC  7.6   NEUTSPOLYS  59.20     Recent Labs      17   0350   BUN  14   CREATININE  0.49*     Recent Labs      01/15/17   0735   VANCOTROUGH  20.7*     Intake/Output Summary (Last 24 hours) at 01/15/17 1006  Last data filed at 01/15/17 0800   Gross per 24 hour   Intake   2970 ml   Output      0 ml   Net   2970 ml      Blood pressure 93/53, pulse 77, temperature 36.3 °C (97.4 °F), temperature source Temporal, resp. rate 16, height 1.575 m (5' 2.01\"), weight 68.9 kg (151 lb 14.4 oz), last menstrual period 2011, SpO2 100 %. Temp (24hrs), Av.4 °C (97.5 °F), Min:36.2 °C (97.2 °F), Max:36.5 °C (97.7 °F)      A/P   1. Vancomycin dose change: 1100 mg IV q8h (0130, 0930, 1730)  2. Next vancomycin level: 1-2 days (not ordered)  3. Goal trough: 12-16 mcg/ml  4. Comments: level today supratherapeutic at 20.7 mcg/ml.  Early morning dose was given late so the level is only 6.5 hrs after the previous " dose.  Taking this into account, I anticipate level at ~7.5-8 hrs after last dose to still be supratherapeutic but maybe around ~18 mcg/ml.  Will decrease dose based on estimated corrected level of ~18 and goal of ~14 and start 1100 mg (16 mg/kg) IV q8h.  Will allow an additional 2 hrs for pt to clear vanco before starting new dose.  Will recheck a level in 1-2 days noting that therapy is to be complete on 1/18.  Pharmacy will continue to monitor and adjust dosing if needed.      Sonia Gonzalez, PharmD

## 2017-01-15 NOTE — PROGRESS NOTES
Received report at bedside and assumed care of patient at 1900. Pt resting comfortably in bed at this time. AxOx4, pt ambulates steadily with no assistance, bed in low and locked position, call light within reach and used appropriately, hourly rounding in place. Pt in no signs of distress at this time.

## 2017-01-15 NOTE — PROGRESS NOTES
RN called pharmacy to verify about pt's two Vancomycin orders at 1600. Jhonatan from pharmacy was asked by RN about this as RN only had the one 1400mg in 250ml of NS. Jhonatan said to go ahead give it and start this D5W 1400mg on the next dose. Documented on MAR.

## 2017-01-15 NOTE — CARE PLAN
Problem: Safety  Goal: Will remain free from falls  Bed in low and locked position, non-slip socks on patient, hourly rounding in place.     Problem: Pain Management  Goal: Pain level will decrease to patient’s comfort goal  Pt reporting 7/10 pain, medicated per MAR.

## 2017-01-15 NOTE — PROGRESS NOTES
"Pharmacy Kinetics 31 y.o. female on vancomycin day # 12 2017    Currently on Vancomycin 1400 mg iv q8hr (0000, 0800, 1600)    Indication for Treatment: facial cellulitis    Pertinent history per medical record: Admitted on 1/3/2017 for facial cellulitis.  Patient with left forehead swelling above eye.  She thought it was a pimple, but today is much larger and painful.  CT orbits with possible cellulitis.  Pt seen by ID today and plan for IV vancomycin x 14 days from negative cultures.   is last negative culture, thus stop date is .    Other antibiotics: none    Allergies: Amoxicillin and Penicillins     List concerns for renal function: transaminitis (last checked )    Pertinent cultures to date:   17: blood peripheral - no growth to date   17: blood (peripheral) - no growth to date x2  17: wound (head) POS - methicillin resistant staphylococcus aureus, heavy growth  1/3/17: blood (peripheral) POS - gram positive cocci, possible staphylococcus sp. x2    Recent Labs      17   0610  17   0350   WBC  7.1  7.6   NEUTSPOLYS  51.70  59.20     Recent Labs      17   0610  17   0350   BUN  12  14   CREATININE  0.50  0.49*     Intake/Output Summary (Last 24 hours) at 17 1639  Last data filed at 17 1800   Gross per 24 hour   Intake    500 ml   Output      0 ml   Net    500 ml      Blood pressure 104/57, pulse 80, temperature 36.2 °C (97.2 °F), temperature source Temporal, resp. rate 17, height 1.575 m (5' 2.01\"), weight 68.9 kg (151 lb 14.4 oz), last menstrual period 2011, SpO2 98 %. Temp (24hrs), Av.6 °C (97.9 °F), Min:36.2 °C (97.2 °F), Max:37.2 °C (99 °F)      A/P   1. Vancomycin dose change: not indicated  2. Next vancomycin level: 1/15 @ 0730  3. Goal trough: 12-16 mcg/ml  4. Comments: level originally scheduled for this afternoon but was missed.  No changes in renal function and pt has been stable on this dose for several days.  Will reschedule " the level to be tomorrow and I will follow up on level when it is available.  Pharmacy will continue to monitor and adjust dosing if needed.      Sonia Gonzalez, PharmD

## 2017-01-15 NOTE — PROGRESS NOTES
Patient awake and alert. Vanco trough completed, and antibiotics administered. PICC line sluggish, [facility] blue team notified, alteplase ordered per verbal order. Patient ambulating independently in room.

## 2017-01-16 LAB
ANION GAP SERPL CALC-SCNC: 8 MMOL/L (ref 0–11.9)
BASOPHILS # BLD AUTO: 0.7 % (ref 0–1.8)
BASOPHILS # BLD: 0.04 K/UL (ref 0–0.12)
BUN SERPL-MCNC: 14 MG/DL (ref 8–22)
CALCIUM SERPL-MCNC: 8.9 MG/DL (ref 8.5–10.5)
CHLORIDE SERPL-SCNC: 102 MMOL/L (ref 96–112)
CO2 SERPL-SCNC: 26 MMOL/L (ref 20–33)
CREAT SERPL-MCNC: 0.5 MG/DL (ref 0.5–1.4)
EOSINOPHIL # BLD AUTO: 0.18 K/UL (ref 0–0.51)
EOSINOPHIL NFR BLD: 2.9 % (ref 0–6.9)
ERYTHROCYTE [DISTWIDTH] IN BLOOD BY AUTOMATED COUNT: 41.5 FL (ref 35.9–50)
GFR SERPL CREATININE-BSD FRML MDRD: >60 ML/MIN/1.73 M 2
GLUCOSE SERPL-MCNC: 97 MG/DL (ref 65–99)
HCT VFR BLD AUTO: 33 % (ref 37–47)
HGB BLD-MCNC: 10.8 G/DL (ref 12–16)
IMM GRANULOCYTES # BLD AUTO: 0.07 K/UL (ref 0–0.11)
IMM GRANULOCYTES NFR BLD AUTO: 1.1 % (ref 0–0.9)
LYMPHOCYTES # BLD AUTO: 1.58 K/UL (ref 1–4.8)
LYMPHOCYTES NFR BLD: 25.8 % (ref 22–41)
MCH RBC QN AUTO: 28.4 PG (ref 27–33)
MCHC RBC AUTO-ENTMCNC: 32.7 G/DL (ref 33.6–35)
MCV RBC AUTO: 86.8 FL (ref 81.4–97.8)
MONOCYTES # BLD AUTO: 0.57 K/UL (ref 0–0.85)
MONOCYTES NFR BLD AUTO: 9.3 % (ref 0–13.4)
NEUTROPHILS # BLD AUTO: 3.69 K/UL (ref 2–7.15)
NEUTROPHILS NFR BLD: 60.2 % (ref 44–72)
NRBC # BLD AUTO: 0 K/UL
NRBC BLD AUTO-RTO: 0 /100 WBC
PLATELET # BLD AUTO: 347 K/UL (ref 164–446)
PMV BLD AUTO: 8.7 FL (ref 9–12.9)
POTASSIUM SERPL-SCNC: 4 MMOL/L (ref 3.6–5.5)
RBC # BLD AUTO: 3.8 M/UL (ref 4.2–5.4)
SODIUM SERPL-SCNC: 136 MMOL/L (ref 135–145)
WBC # BLD AUTO: 6.1 K/UL (ref 4.8–10.8)

## 2017-01-16 PROCEDURE — 85025 COMPLETE CBC W/AUTO DIFF WBC: CPT

## 2017-01-16 PROCEDURE — 700102 HCHG RX REV CODE 250 W/ 637 OVERRIDE(OP): Performed by: INTERNAL MEDICINE

## 2017-01-16 PROCEDURE — 80048 BASIC METABOLIC PNL TOTAL CA: CPT

## 2017-01-16 PROCEDURE — A9270 NON-COVERED ITEM OR SERVICE: HCPCS | Performed by: INTERNAL MEDICINE

## 2017-01-16 PROCEDURE — 700102 HCHG RX REV CODE 250 W/ 637 OVERRIDE(OP): Performed by: EMERGENCY MEDICINE

## 2017-01-16 PROCEDURE — 700102 HCHG RX REV CODE 250 W/ 637 OVERRIDE(OP): Performed by: HOSPITALIST

## 2017-01-16 PROCEDURE — A9270 NON-COVERED ITEM OR SERVICE: HCPCS | Performed by: HOSPITALIST

## 2017-01-16 PROCEDURE — 99232 SBSQ HOSP IP/OBS MODERATE 35: CPT | Mod: GC | Performed by: INTERNAL MEDICINE

## 2017-01-16 PROCEDURE — 700105 HCHG RX REV CODE 258: Performed by: INTERNAL MEDICINE

## 2017-01-16 PROCEDURE — 700111 HCHG RX REV CODE 636 W/ 250 OVERRIDE (IP)

## 2017-01-16 PROCEDURE — 770021 HCHG ROOM/CARE - ISO PRIVATE

## 2017-01-16 PROCEDURE — A9270 NON-COVERED ITEM OR SERVICE: HCPCS | Performed by: EMERGENCY MEDICINE

## 2017-01-16 RX ORDER — SODIUM CHLORIDE 9 MG/ML
1000 INJECTION, SOLUTION INTRAVENOUS ONCE
Status: COMPLETED | OUTPATIENT
Start: 2017-01-16 | End: 2017-01-16

## 2017-01-16 RX ADMIN — TOBRAMYCIN AND DEXAMETHASONE 1 DROP: 3; 1 SUSPENSION/ DROPS OPHTHALMIC at 15:30

## 2017-01-16 RX ADMIN — VANCOMYCIN HYDROCHLORIDE 1100 MG: 10 INJECTION, POWDER, LYOPHILIZED, FOR SOLUTION INTRAVENOUS at 17:37

## 2017-01-16 RX ADMIN — TOBRAMYCIN AND DEXAMETHASONE 1 DROP: 3; 1 SUSPENSION/ DROPS OPHTHALMIC at 05:15

## 2017-01-16 RX ADMIN — VANCOMYCIN HYDROCHLORIDE 1100 MG: 10 INJECTION, POWDER, LYOPHILIZED, FOR SOLUTION INTRAVENOUS at 02:39

## 2017-01-16 RX ADMIN — ACETAMINOPHEN 650 MG: 325 TABLET, FILM COATED ORAL at 20:31

## 2017-01-16 RX ADMIN — TOBRAMYCIN AND DEXAMETHASONE 1 DROP: 3; 1 SUSPENSION/ DROPS OPHTHALMIC at 11:53

## 2017-01-16 RX ADMIN — ACETAMINOPHEN 650 MG: 325 TABLET, FILM COATED ORAL at 14:34

## 2017-01-16 RX ADMIN — HYDROCODONE BITARTRATE AND ACETAMINOPHEN 1 TABLET: 5; 325 TABLET ORAL at 17:37

## 2017-01-16 RX ADMIN — TOBRAMYCIN AND DEXAMETHASONE 1 DROP: 3; 1 SUSPENSION/ DROPS OPHTHALMIC at 09:53

## 2017-01-16 RX ADMIN — SODIUM CHLORIDE 1000 ML: 9 INJECTION, SOLUTION INTRAVENOUS at 14:36

## 2017-01-16 RX ADMIN — HYDROCODONE BITARTRATE AND ACETAMINOPHEN 2 TABLET: 5; 325 TABLET ORAL at 23:39

## 2017-01-16 RX ADMIN — TOBRAMYCIN AND DEXAMETHASONE 1 DROP: 3; 1 SUSPENSION/ DROPS OPHTHALMIC at 17:39

## 2017-01-16 RX ADMIN — ACETAMINOPHEN 650 MG: 325 TABLET, FILM COATED ORAL at 08:24

## 2017-01-16 RX ADMIN — Medication 400 MG: at 20:31

## 2017-01-16 RX ADMIN — TOBRAMYCIN AND DEXAMETHASONE 1 DROP: 3; 1 SUSPENSION/ DROPS OPHTHALMIC at 20:31

## 2017-01-16 RX ADMIN — Medication 400 MG: at 08:24

## 2017-01-16 RX ADMIN — TOBRAMYCIN AND DEXAMETHASONE 1 DROP: 3; 1 SUSPENSION/ DROPS OPHTHALMIC at 08:25

## 2017-01-16 RX ADMIN — VANCOMYCIN HYDROCHLORIDE 1100 MG: 10 INJECTION, POWDER, LYOPHILIZED, FOR SOLUTION INTRAVENOUS at 09:53

## 2017-01-16 RX ADMIN — Medication 100 MG: at 08:24

## 2017-01-16 ASSESSMENT — PAIN SCALES - GENERAL
PAINLEVEL_OUTOF10: 6
PAINLEVEL_OUTOF10: 6
PAINLEVEL_OUTOF10: 10
PAINLEVEL_OUTOF10: 6
PAINLEVEL_OUTOF10: 8

## 2017-01-16 ASSESSMENT — ENCOUNTER SYMPTOMS
WEIGHT LOSS: 0
NEUROLOGICAL NEGATIVE: 1
BLURRED VISION: 0
EYE DISCHARGE: 0
PSYCHIATRIC NEGATIVE: 1
RESPIRATORY NEGATIVE: 1
CARDIOVASCULAR NEGATIVE: 1
FEVER: 0
MUSCULOSKELETAL NEGATIVE: 1
DOUBLE VISION: 0
PHOTOPHOBIA: 0
SORE THROAT: 0
HEADACHES: 0
GASTROINTESTINAL NEGATIVE: 1
CHILLS: 0

## 2017-01-16 NOTE — PROGRESS NOTES
Patient alert/orientedx4,risk for fall,refused bed alarm despite education,patient does not call for assistance,call light and personal belongings within reach and bed in low position.

## 2017-01-16 NOTE — PROGRESS NOTES
Patient A&Ox4, eager for discharge. Vancomycin abx administered. L orbit much improved with small scab to L forehead. Patient c/o headache this morning that resolved with tylenol and a hot shower. PICC to RUE with no s/s of infection. Patient ambulates independently with steady gait.

## 2017-01-16 NOTE — PROGRESS NOTES
Patient having persistent headaches in back of neck despite prn pain medication and hot showers. Rakesh MAYORGA notified.

## 2017-01-16 NOTE — CARE PLAN
Problem: Communication  Goal: The ability to communicate needs accurately and effectively will improve  Outcome: MET Date Met:  01/16/17    Problem: Bowel/Gastric:  Goal: Normal bowel function is maintained or improved  Outcome: MET Date Met:  01/16/17    Problem: Knowledge Deficit  Goal: Knowledge of disease process/condition, treatment plan, diagnostic tests, and medications will improve  Outcome: PROGRESSING AS EXPECTED  Engaged in POC.    Problem: Discharge Barriers/Planning  Goal: Patient’s continuum of care needs will be met  Outcome: PROGRESSING AS EXPECTED  Antibiotic therapy through 1/19. Patient aware.

## 2017-01-16 NOTE — PROGRESS NOTES
[facility] [place] Internal Medicine Interval Note    Name Rola Carrillo 1985   Age/Sex 31 y.o. female   MRN [id number]   Code Status Full     After 5PM or if no immediate response to page, please call for cross-coverage  Attending/Team: Douglas/Dr. Apple Call [phone] to page   1st Call - Day Intern (R1):   Dr. Ruiz 2nd Call - Day Sr. Resident (R2/R3):   Dr. Little         Chief complaint/ reason for interval visit (Primary Diagnosis)   Left pre and post septal orbital cellulitis and left forehead    Interval Problem Daily Status Update    - Patient feeling good; Left eye significantly improved;   - stable &  BCx (-)  -> ABx 2 weeks -> end date    - given clinical stability, will repeat labs on .     Active Hospital Problems    Diagnosis   • Hyperthyroidism [E05.90] repeat tsh improved   • Cellulitis of left orbit [H05.012] improving       Review of Systems   Constitutional: Positive for fever. Negative for chills and weight loss.   HENT: Negative for congestion, nosebleeds and sore throat.    Eyes: Negative for blurred vision, double vision, photophobia and discharge.        Left eyelid now normal  Right eye wnl   Respiratory: Negative.    Cardiovascular: Negative.    Gastrointestinal: Negative.    Genitourinary: Negative.    Musculoskeletal: Negative.    Neurological: Negative.  Negative for headaches.   Endo/Heme/Allergies: Negative.    Psychiatric/Behavioral: Negative.        Consultants/Specialty  Ophthalmology  Infectious Disease: Dr. Hilliard    Disposition  Inpatient being treated for orbital cellulitis    Quality Measures  Radiology images reviewed, Labs reviewed and Medications reviewed  Castellanos catheter: No Castellanos      DVT Prophylaxis: Enoxaparin (Lovenox)                  Physical Exam       Filed Vitals:    01/15/17 0800 01/15/17 0818 01/15/17 1600 01/15/17 2000   BP: 97/51 93/53 103/59 100/58   Pulse: 78 77 97 99   Temp: 36.3 °C (97.4 °F)  36.6 °C (97.8 °F)  36.5 °C (97.7 °F)   TempSrc:       Resp: 16  16 16   Height:       Weight:       SpO2: 100%  95% 100%     Body mass index is 27.78 kg/(m^2).    Oxygen Therapy:  Pulse Oximetry: 100 %, O2 (LPM): 0, O2 Delivery: None (Room Air)    Physical Exam   Constitutional: She is oriented to person, place, and time and well-developed, well-nourished, and in no distress. No distress.   HENT:   Head: Normocephalic and atraumatic.   Eyes: Right eye exhibits no discharge. Left eye exhibits no discharge. Right conjunctiva is not injected. Right conjunctiva has no hemorrhage. Left conjunctiva has no hemorrhage. No scleral icterus. Right eye exhibits normal extraocular motion and no nystagmus. Left eye exhibits normal extraocular motion and no nystagmus. Right pupil is round and reactive. Left pupil is round and reactive.       EOM better on left now, RT wnl   Neck: Normal range of motion. Neck supple. No thyromegaly present.   Cardiovascular: Regular rhythm and normal heart sounds.    No murmur heard.  Pulmonary/Chest: Effort normal and breath sounds normal.   Abdominal: Soft. Bowel sounds are normal.   Neurological: She is alert and oriented to person, place, and time.   Skin: She is not diaphoretic.   Psychiatric: Affect normal.   Vitals reviewed.        Lab Data Review:      1/4/2017  5:49 PM    Recent Labs      01/14/17   0350   SODIUM  134*   POTASSIUM  4.1   CHLORIDE  102   CO2  26   BUN  14   CREATININE  0.49*   CALCIUM  9.0       Recent Labs      01/14/17   0350   GLUCOSE  92       Recent Labs      01/14/17   0350   RBC  3.97*   HEMOGLOBIN  10.9*   HEMATOCRIT  34.3*   PLATELETCT  382       Recent Labs      01/14/17   0350   WBC  7.6   NEUTSPOLYS  59.20   LYMPHOCYTES  26.80   MONOCYTES  8.80   EOSINOPHILS  3.10   BASOPHILS  0.40           Assessment/Plan     Cellulitis of left orbit  Assessment:  -Left upper face swelling/erythema involving upper eyelid and eye  - On admission: acute onset x 2 days after pimple appeared on the  left side of forehead; progressively involved the left eyelid with swelling and unable to open the eye    - on admission: grossly intact extra-ocular muscles movement; with normal vision 20/23 with hand-held snellen chart    - on admission: WBC count: 17.6, VS: /76, pulse 83, temp 36.5 °C (97.7 °F), SpO2 100 %. RA   - tonometry in ED: 12 RE, 24 LE  - CT orbits: orbital cellulitis  - 1/4/17: worsening swelling of eyelid; EOM restricted (partly due to worsening chemosis), Vision intact 20/30, light reflex normal; ophthalmology on board; erythema has reduced and regressed from the initial marking; also purulent substance from the pustule, sent for culture.    - Ophthalmology following closely: started patient on tobramycin-dexamethasone drops q2hrs  - MR head w/wo: Impression: mild orbital and periorbital cellulitis, mild proptosis, no abscess, no evidence of cavernous sinus thrombosis.   - continued on vancomycin and d/c ceftriaxone given staph aureus; MRSA + in both wound and blood  - 1/8: significant improvement in the left eye, able to open more, swelling significantly decreased; chemosis decreased.  > PLAN:   - continue Vancomycin per ID recs  - ECHO: wnl, EF: 65%, no valvular vegetation  -  repeat BCx 1/5: no growth  - 14 days IV vancomycin from 1/5,  - eye exam improved  - tylenol prn for pain and fever.  - PICC line placed 1/10;   - 1/11: curbsided Dr. Hilliard; per his recommendation; no change in antibiotic. Continue vancomycin and complete the course.   - BCx (-) 1/16 -> ABx 2 weeks -> end date 1/19   - ctm         Hyperthyroidism  - TSH: 0.280  - Free T-4: 2.12  - no physical symptoms concerning hyperthyroidism; except HR >100, however, patient has fever with infection   - will need further workup once acute complaint is more stable.   - consider TSI/ US   - repeat TSH wnl; likely due to acute infectious process; will monitor.     Elevated liver enzymes  - 1/6 liver enzymes: AST: 53  ALT: 80 -->AST:  46, ALT: 74; trending down  - ?possibly from Ceftriaxone  - will monitor; pt asymptomatic    Bacteremia due to Staphylococcus  Blood and wound CX positive for MRSA  - ?from infected wound; pt denies IV drug use, UDS + for stimulants  - continue vancomycin for now; will need for around 2 weeks from day culture negative (1/5) per ID recs  - ECHO: normal; EF 65%, no valvular vegetations  - BCx (-) 1/16 -> ABx 2 weeks -> end date 1/19   - ctm     Anemia  Hb: on admission 14.1; 1/6/17: 10.9,  -MCV: 90-->84 (?iron losses); Hb: 12.1(1/7)--Improving  - pt reported heavy menstrual cycle this time  - also has active infection; likely inflammatory process causing drop in Hb  - will monitor; and if drop further; will consider full anemia workup

## 2017-01-16 NOTE — CARE PLAN
Problem: Infection  Goal: Will remain free from infection  Outcome: PROGRESSING AS EXPECTED  Contact isolation precautions. PICC in place with no s/s of infection.     Problem: Discharge Barriers/Planning  Goal: Patient’s continuum of care needs will be met  Outcome: PROGRESSING AS EXPECTED  Antibiotic therapy to be completed 1/19/17.

## 2017-01-17 PROBLEM — R59.0 CERVICAL LYMPHADENOPATHY: Status: ACTIVE | Noted: 2017-01-17

## 2017-01-17 PROCEDURE — 700111 HCHG RX REV CODE 636 W/ 250 OVERRIDE (IP): Performed by: INTERNAL MEDICINE

## 2017-01-17 PROCEDURE — 700102 HCHG RX REV CODE 250 W/ 637 OVERRIDE(OP): Performed by: HOSPITALIST

## 2017-01-17 PROCEDURE — 700101 HCHG RX REV CODE 250: Performed by: OPHTHALMOLOGY

## 2017-01-17 PROCEDURE — A9270 NON-COVERED ITEM OR SERVICE: HCPCS | Performed by: INTERNAL MEDICINE

## 2017-01-17 PROCEDURE — 99232 SBSQ HOSP IP/OBS MODERATE 35: CPT | Mod: GC | Performed by: INTERNAL MEDICINE

## 2017-01-17 PROCEDURE — 700102 HCHG RX REV CODE 250 W/ 637 OVERRIDE(OP): Performed by: INTERNAL MEDICINE

## 2017-01-17 PROCEDURE — 770021 HCHG ROOM/CARE - ISO PRIVATE

## 2017-01-17 PROCEDURE — 700111 HCHG RX REV CODE 636 W/ 250 OVERRIDE (IP)

## 2017-01-17 PROCEDURE — A9270 NON-COVERED ITEM OR SERVICE: HCPCS | Performed by: EMERGENCY MEDICINE

## 2017-01-17 PROCEDURE — 700102 HCHG RX REV CODE 250 W/ 637 OVERRIDE(OP): Performed by: EMERGENCY MEDICINE

## 2017-01-17 PROCEDURE — A9270 NON-COVERED ITEM OR SERVICE: HCPCS | Performed by: HOSPITALIST

## 2017-01-17 RX ORDER — SODIUM CHLORIDE 9 MG/ML
1000 INJECTION, SOLUTION INTRAVENOUS ONCE
Status: COMPLETED | OUTPATIENT
Start: 2017-01-17 | End: 2017-01-17

## 2017-01-17 RX ADMIN — Medication 100 MG: at 07:55

## 2017-01-17 RX ADMIN — SODIUM CHLORIDE 1000 ML: 9 INJECTION, SOLUTION INTRAVENOUS at 06:10

## 2017-01-17 RX ADMIN — VANCOMYCIN HYDROCHLORIDE 1100 MG: 10 INJECTION, POWDER, LYOPHILIZED, FOR SOLUTION INTRAVENOUS at 01:40

## 2017-01-17 RX ADMIN — ACETAMINOPHEN 650 MG: 325 TABLET, FILM COATED ORAL at 07:55

## 2017-01-17 RX ADMIN — Medication 400 MG: at 21:31

## 2017-01-17 RX ADMIN — TOBRAMYCIN AND DEXAMETHASONE 1 DROP: 3; 1 SUSPENSION/ DROPS OPHTHALMIC at 14:49

## 2017-01-17 RX ADMIN — TOBRAMYCIN AND DEXAMETHASONE 1 DROP: 3; 1 SUSPENSION/ DROPS OPHTHALMIC at 22:17

## 2017-01-17 RX ADMIN — TOBRAMYCIN AND DEXAMETHASONE 1 DROP: 3; 1 SUSPENSION/ DROPS OPHTHALMIC at 12:45

## 2017-01-17 RX ADMIN — VANCOMYCIN HYDROCHLORIDE 1100 MG: 10 INJECTION, POWDER, LYOPHILIZED, FOR SOLUTION INTRAVENOUS at 12:43

## 2017-01-17 RX ADMIN — TOBRAMYCIN AND DEXAMETHASONE 1 DROP: 3; 1 SUSPENSION/ DROPS OPHTHALMIC at 18:37

## 2017-01-17 RX ADMIN — HYDROCODONE BITARTRATE AND ACETAMINOPHEN 2 TABLET: 5; 325 TABLET ORAL at 21:31

## 2017-01-17 RX ADMIN — TOBRAMYCIN AND DEXAMETHASONE 1 DROP: 3; 1 SUSPENSION/ DROPS OPHTHALMIC at 05:14

## 2017-01-17 RX ADMIN — VANCOMYCIN HYDROCHLORIDE 1100 MG: 10 INJECTION, POWDER, LYOPHILIZED, FOR SOLUTION INTRAVENOUS at 18:39

## 2017-01-17 RX ADMIN — Medication 400 MG: at 07:55

## 2017-01-17 RX ADMIN — ACETAMINOPHEN 650 MG: 325 TABLET, FILM COATED ORAL at 14:48

## 2017-01-17 RX ADMIN — TOBRAMYCIN AND DEXAMETHASONE 1 DROP: 3; 1 SUSPENSION/ DROPS OPHTHALMIC at 07:55

## 2017-01-17 ASSESSMENT — PAIN SCALES - GENERAL
PAINLEVEL_OUTOF10: 5
PAINLEVEL_OUTOF10: 6
PAINLEVEL_OUTOF10: 4
PAINLEVEL_OUTOF10: 4
PAINLEVEL_OUTOF10: 8
PAINLEVEL_OUTOF10: 4
PAINLEVEL_OUTOF10: 7

## 2017-01-17 ASSESSMENT — ENCOUNTER SYMPTOMS
NEUROLOGICAL NEGATIVE: 1
MUSCULOSKELETAL NEGATIVE: 1
BLURRED VISION: 0
DOUBLE VISION: 0
PHOTOPHOBIA: 0
PSYCHIATRIC NEGATIVE: 1
CARDIOVASCULAR NEGATIVE: 1
HEADACHES: 0
SORE THROAT: 0
RESPIRATORY NEGATIVE: 1
WEIGHT LOSS: 0
GASTROINTESTINAL NEGATIVE: 1
CHILLS: 0
FEVER: 0
EYE DISCHARGE: 0

## 2017-01-17 NOTE — PROGRESS NOTES
"Pharmacy Kinetics 31 y.o. female on vancomycin day # 14 2017    Currently on Vancomycin 1100 mg iv q8hr    Indication for Treatment: facial cellulitis    Pertinent history per medical record: Admitted on 1/3/2017 for facial cellulitis.  Patient with left forehead swelling above eye.  She thought it was a pimple, but became much larger and painful.  CT of orbits with possible cellulitis.  Pt seen by ID and plan for IV vancomycin x 14 days from negative cultures.   is last negative culture, thus stop date is .    Other antibiotics: none    Allergies: Amoxicillin and Penicillins     List concerns for renal function: transaminitis (LFTs last checked )    Pertinent cultures to date:   17: blood peripheral - no growth to date   17: blood (peripheral) - no growth to date x2  17: wound (head) POS - methicillin resistant staphylococcus aureus, heavy growth  1/3/17: blood (peripheral) POS - gram positive cocci, possible staphylococcus sp. x2        Recent Labs      17   0350  17   0425   WBC  7.6  6.1   NEUTSPOLYS  59.20  60.20     Recent Labs      17   0350  17   0425   BUN  14  14   CREATININE  0.49*  0.50     Recent Labs      01/15/17   0735   VANCOTROUGH  20.7*     Intake/Output Summary (Last 24 hours) at 17 1648  Last data filed at 17 0400   Gross per 24 hour   Intake   1380 ml   Output      0 ml   Net   1380 ml      Blood pressure 108/65, pulse 70, temperature 36.9 °C (98.5 °F), temperature source Temporal, resp. rate 16, height 1.575 m (5' 2.01\"), weight 68.9 kg (151 lb 14.4 oz), last menstrual period 2011, SpO2 96 %. Temp (24hrs), Av.7 °C (98.1 °F), Min:36.2 °C (97.2 °F), Max:37.1 °C (98.8 °F)      A/P   1. Vancomycin dose change: continue current dosing  2. Next vancomycin level: not needed if antibiotics end 17  3. Goal trough: 12-16 mcg/mL  4. Comments: Dosing decreased to 1100mg iv every 8 hours yesterday. No change in renal indices. " Stop date in place for 1/18/17. No further levels required unless renal function changes significantly    Vincent TalbotD

## 2017-01-17 NOTE — PROGRESS NOTES
[facility] blue resident notified of mass to patient's left neck. I am not able to find documentation of this mass and patient states it is new. Reports pain associated with mass. No discoloration to skin, no discharge, no open areas noted.

## 2017-01-17 NOTE — PROGRESS NOTES
[facility] [place] Internal Medicine Interval Note    Name Rola Carrillo 1985   Age/Sex 31 y.o. female   MRN [id number]   Code Status Full     After 5PM or if no immediate response to page, please call for cross-coverage  Attending/Team: Douglas/Dr. Apple Call [phone] to page   1st Call - Day Intern (R1):   Dr. Cameron  2nd Call - Day Sr. Resident (R2/R3):   Dr. Little         Chief complaint/ reason for interval visit (Primary Diagnosis)   Left pre and post septal orbital cellulitis and left forehead    Interval Problem Daily Status Update    - Patient feeling good; Left eye significantly improved;   - stable &  BCx (-)  -> ABx 2 weeks -> end date    - given clinical stable  -Pt c/o of neck mass, evaluated by Night float, like reactive cervical lymph node  -PICC to RUE with no s/s of infection    Active Hospital Problems    Diagnosis   • Hyperthyroidism [E05.90] repeat tsh improved   • Cellulitis of left orbit [H05.012] improving       Review of Systems   Constitutional: Negative for fever, chills and weight loss.   HENT: Negative for congestion, nosebleeds and sore throat.    Eyes: Negative for blurred vision, double vision, photophobia and discharge.        Left eyelid now normal  Right eye wnl   Respiratory: Negative.    Cardiovascular: Negative.    Gastrointestinal: Negative.    Genitourinary: Negative.    Musculoskeletal: Negative.    Neurological: Negative.  Negative for headaches.   Endo/Heme/Allergies: Negative.    Psychiatric/Behavioral: Negative.        Consultants/Specialty  Ophthalmology  Infectious Disease: Dr. Hilliard    Disposition  Inpatient being treated for orbital cellulitis    Quality Measures  Radiology images reviewed, Labs reviewed and Medications reviewed  Castellanos catheter: No Castellanos      DVT Prophylaxis: Enoxaparin (Lovenox)                  Physical Exam       Filed Vitals:    17 0821 17 1020 17 1551 17   BP: 82/51 109/60  108/65 105/57   Pulse: 87 91 70 99   Temp: 37.1 °C (98.8 °F)  36.9 °C (98.5 °F) 37 °C (98.6 °F)   TempSrc:       Resp: 17 17 16 16   Height:       Weight:       SpO2: 98%  96% 96%     Body mass index is 27.78 kg/(m^2).    Oxygen Therapy:  Pulse Oximetry: 96 %, O2 (LPM): 0, O2 Delivery: None (Room Air)    Physical Exam   Constitutional: She is oriented to person, place, and time and well-developed, well-nourished, and in no distress. No distress.   HENT:   Head: Normocephalic and atraumatic.   Eyes: Right eye exhibits no discharge. Left eye exhibits no discharge. Right conjunctiva is not injected. Right conjunctiva has no hemorrhage. Left conjunctiva has no hemorrhage. No scleral icterus. Right eye exhibits normal extraocular motion and no nystagmus. Left eye exhibits normal extraocular motion and no nystagmus. Right pupil is round and reactive. Left pupil is round and reactive.       EOM Intact of both eyes   Neck: Normal range of motion. Neck supple. No thyromegaly present.   Cardiovascular: Regular rhythm and normal heart sounds.    No murmur heard.  Pulmonary/Chest: Effort normal and breath sounds normal.   Abdominal: Soft. Bowel sounds are normal.   Neurological: She is alert and oriented to person, place, and time.   Skin: She is not diaphoretic.   Psychiatric: Affect normal.   Vitals reviewed.        Lab Data Review:      1/4/2017  5:49 PM    Recent Labs      01/14/17   0350  01/16/17   0425   SODIUM  134*  136   POTASSIUM  4.1  4.0   CHLORIDE  102  102   CO2  26  26   BUN  14  14   CREATININE  0.49*  0.50   CALCIUM  9.0  8.9       Recent Labs      01/14/17   0350  01/16/17   0425   GLUCOSE  92  97       Recent Labs      01/14/17   0350  01/16/17   0425   RBC  3.97*  3.80*   HEMOGLOBIN  10.9*  10.8*   HEMATOCRIT  34.3*  33.0*   PLATELETCT  382  347       Recent Labs      01/14/17   0350  01/16/17   0425   WBC  7.6  6.1   NEUTSPOLYS  59.20  60.20   LYMPHOCYTES  26.80  25.80   MONOCYTES  8.80  9.30   EOSINOPHILS   3.10  2.90   BASOPHILS  0.40  0.70           Assessment/Plan     Cellulitis of left orbit  Assessment:  -Left upper face swelling/erythema involving upper eyelid and eye  - On admission: acute onset x 2 days after pimple appeared on the left side of forehead; progressively involved the left eyelid with swelling and unable to open the eye    - on admission: grossly intact extra-ocular muscles movement; with normal vision 20/23 with hand-held snellen chart    - on admission: WBC count: 17.6, VS: /76, pulse 83, temp 36.5 °C (97.7 °F), SpO2 100 %. RA   - tonometry in ED: 12 RE, 24 LE  - CT orbits: orbital cellulitis  - 1/4/17: worsening swelling of eyelid; EOM restricted (partly due to worsening chemosis), Vision intact 20/30, light reflex normal; ophthalmology on board; erythema has reduced and regressed from the initial marking; also purulent substance from the pustule, sent for culture.    - Ophthalmology following closely: started patient on tobramycin-dexamethasone drops q2hrs  - MR head w/wo: Impression: mild orbital and periorbital cellulitis, mild proptosis, no abscess, no evidence of cavernous sinus thrombosis.   - continued on vancomycin and d/c ceftriaxone given staph aureus; MRSA + in both wound and blood  - 1/8: significant improvement in the left eye, able to open more, swelling significantly decreased; chemosis decreased.  > PLAN:   - continue Vancomycin per ID recs  - ECHO: wnl, EF: 65%, no valvular vegetation  -  repeat BCx 1/5: no growth  - 14 days IV vancomycin from 1/5 till 1/19  - eye exam improved  - tylenol prn for pain and fever.  - PICC line placed 1/10;   - 1/11: Dr. Hilliard; per his recommendation; no change in antibiotic. Continue vancomycin and complete the course.   - BCx (-) 1/16 -> ABx 2 weeks -> end date 1/19   - ctm         Hyperthyroidism  - TSH: 0.280 low ----> 2.3 wnl on 1/9  - Free T-4: 2.12 high  - no physical symptoms concerning hyperthyroidism; except HR >100, however,  patient has fever with infection   - will need further workup once acute complaint is more stable.   - consider TSI/ US   - repeat TSH wnl; likely due to acute infectious process; will monitor.     Elevated liver enzymes  - 1/6 liver enzymes: AST: 53  ALT: 80 -->AST: 46, ALT: 74; trending down  - ?possibly from Ceftriaxone  - will monitor; pt asymptomatic    Bacteremia due to Staphylococcus  Blood and wound CX positive for MRSA  - ?from infected wound; pt denies IV drug use, UDS + for stimulants  - continue vancomycin for now; will need for around 2 weeks from day culture negative (1/5) per ID recs  - ECHO: normal; EF 65%, no valvular vegetations  - BCx (-) 1/16 -> ABx 2 weeks -> end date 1/19   - ctm     Anemia  Hb: on admission H/H: 14.1/45.8----> 10.8/33 1/16, MCV:  Iron: 27 low, TIBC: 344 wnl, % sat: 8 low  (?iron losses)  - pt reported heavy menstrual cycle this time  - also has active infection; likely inflammatory process causing drop in Hb  - will monitor; and if drop further; will consider full anemia workup

## 2017-01-17 NOTE — CARE PLAN
Problem: Infection  Goal: Will remain free from infection  Outcome: PROGRESSING AS EXPECTED  Receiving IV abx.     Problem: Pain Management  Goal: Pain level will decrease to patient’s comfort goal  Outcome: PROGRESSING AS EXPECTED  Complained of neck pain. Medicated appropriately per MAR.

## 2017-01-17 NOTE — PROGRESS NOTES
Paged by RN @ 7:20 PM, stating that patient has a new mass in her left neck.     Evaluated patient at bedside @ 7:30 PM, patient is sitting comfortably on the bed, reports that when she woke up this morning, she has been feeling some pain and stiffness on the back of her neck on the left side and she notice a lump when she rub her neck. On physical exam - vitals stable, intact neuro exam, no visual impairment, no neck stiffness, PERRLA, EOMI, 2 small nodules (possible cervical lymph nodes) palpable in posterior cervical area on the left side, non-tender (instead patient reports feel better when writer examine her neck), no bruit heard over the mass or carotid bruit heard. Patient's neck pain better with massage, likely musculoskeletal. Regarding 2 small lumps/nodules likely cervical lymph node reactive from recent infection, no concern for carotid dissection or acute lesion at this point.

## 2017-01-17 NOTE — PROGRESS NOTES
Received report from day shift RN. Discussed POC with pt., verbalized understanding, assumed care @1915. A&Ox4. On room air. Complained of neck pain rated 8/10 on pain scale. Medicated appropriately per MAR. Pt examined by Dr. Bee @7:30 at bedside for lump on L side of neck. PICC line in place. CHG bath done. Receiving IV abx. Up self, steady. Safety precautions in place; treaded socks on, call light within reach, personal belongings within reach, upper bed rails up.

## 2017-01-17 NOTE — PROGRESS NOTES
[facility] Internal Medicine Interval Note    Name Rola Carrillo 1985   Age/Sex 31 y.o. female   MRN [id number]   Code Status Full     After 5PM or if no immediate response to page, please call for cross-coverage  Attending/Team: Douglas/Dr. Apple Call [phone] to page   1st Call - Day Intern (R1):   Dr. Cameron  2nd Call - Day Sr. Resident (R2/R3):   Dr. Little         Chief complaint/ reason for interval visit (Primary Diagnosis)   Left pre and post septal orbital cellulitis and left forehead    Interval Problem Daily Status Update    - Patient feeling good; Left eye significantly improved;   - stable &  BCx (-)  -> ABx 2 weeks -> end date    -Pt c/o of neck mass, had b/l palpable non tender LN ( posterior cervical region), no s/s of inflammation, skin intact   -PICC to RUE with no s/s of infection    Will check LDH and ESR     Active Hospital Problems    Diagnosis   • Hyperthyroidism [E05.90] repeat tsh improved   • Cellulitis of left orbit [H05.012] improving       Review of Systems   Constitutional: Negative for fever, chills and weight loss.   HENT: Negative for congestion, nosebleeds and sore throat.    Eyes: Negative for blurred vision, double vision, photophobia and discharge.        Left eyelid now normal  Right eye wnl   Respiratory: Negative.    Cardiovascular: Negative.    Gastrointestinal: Negative.    Genitourinary: Negative.    Musculoskeletal: Negative.    Neurological: Negative.  Negative for headaches.   Endo/Heme/Allergies: Negative.    Psychiatric/Behavioral: Negative.        Consultants/Specialty  Ophthalmology  Infectious Disease: Dr. Hilliard    Disposition  Inpatient being treated for orbital cellulitis    Quality Measures  Radiology images reviewed, Labs reviewed and Medications reviewed  Castellanos catheter: No Castellanos      DVT Prophylaxis: Enoxaparin (Lovenox)                  Physical Exam       Filed Vitals:    17 1551 17  01/17/17 0400 01/17/17 0810   BP: 108/65 105/57 93/54 110/65   Pulse: 70 99 87 108   Temp: 36.9 °C (98.5 °F) 37 °C (98.6 °F) 36.7 °C (98 °F) 37.3 °C (99.2 °F)   TempSrc:       Resp: 16 16 16 20   Height:       Weight:       SpO2: 96% 96% 91% 96%     Body mass index is 27.78 kg/(m^2).    Oxygen Therapy:  Pulse Oximetry: 96 %, O2 (LPM): 0, O2 Delivery: None (Room Air)    Physical Exam   Constitutional: She is oriented to person, place, and time and well-developed, well-nourished, and in no distress. No distress.   HENT:   Head: Normocephalic and atraumatic.   Eyes: Right eye exhibits no discharge. Left eye exhibits no discharge. Right conjunctiva is not injected. Right conjunctiva has no hemorrhage. Left conjunctiva has no hemorrhage. No scleral icterus. Right eye exhibits normal extraocular motion and no nystagmus. Left eye exhibits normal extraocular motion and no nystagmus. Right pupil is round and reactive. Left pupil is round and reactive.       EOM Intact of both eyes   Neck: Normal range of motion. Neck supple. No thyromegaly present.   Cardiovascular: Regular rhythm and normal heart sounds.    No murmur heard.  Pulmonary/Chest: Effort normal and breath sounds normal.   Abdominal: Soft. Bowel sounds are normal.   Neurological: She is alert and oriented to person, place, and time.   Skin: She is not diaphoretic.   Psychiatric: Affect normal.   Vitals reviewed.        Lab Data Review:      1/4/2017  5:49 PM    Recent Labs      01/16/17   0425   SODIUM  136   POTASSIUM  4.0   CHLORIDE  102   CO2  26   BUN  14   CREATININE  0.50   CALCIUM  8.9       Recent Labs      01/16/17   0425   GLUCOSE  97       Recent Labs      01/16/17 0425   RBC  3.80*   HEMOGLOBIN  10.8*   HEMATOCRIT  33.0*   PLATELETCT  347       Recent Labs      01/16/17 0425   WBC  6.1   NEUTSPOLYS  60.20   LYMPHOCYTES  25.80   MONOCYTES  9.30   EOSINOPHILS  2.90   BASOPHILS  0.70           Assessment/Plan     Cellulitis of left  orbit  Assessment:  -Left upper face swelling/erythema involving upper eyelid and eye  - On admission: acute onset x 2 days after pimple appeared on the left side of forehead; progressively involved the left eyelid with swelling and unable to open the eye    - on admission: grossly intact extra-ocular muscles movement; with normal vision 20/23 with hand-held snellen chart    - on admission: WBC count: 17.6, VS: /76, pulse 83, temp 36.5 °C (97.7 °F), SpO2 100 %. RA   - tonometry in ED: 12 RE, 24 LE  - CT orbits: orbital cellulitis  - 1/4/17: worsening swelling of eyelid; EOM restricted (partly due to worsening chemosis), Vision intact 20/30, light reflex normal; ophthalmology on board; erythema has reduced and regressed from the initial marking; also purulent substance from the pustule, sent for culture.    - Ophthalmology following closely: started patient on tobramycin-dexamethasone drops q2hrs  - MR head w/wo: Impression: mild orbital and periorbital cellulitis, mild proptosis, no abscess, no evidence of cavernous sinus thrombosis.   - continued on vancomycin and d/c ceftriaxone given staph aureus; MRSA + in both wound and blood  - 1/8: significant improvement in the left eye, able to open more, swelling significantly decreased; chemosis decreased.  PLAN:   - continue Vancomycin per ID recs  - ECHO: wnl, EF: 65%, no valvular vegetation  -  repeat BCx 1/5: no growth  - 14 days IV vancomycin from 1/5 till 1/19  - eye exam improved  - tylenol prn for pain and fever.  - PICC line placed 1/10;   - 1/11: Dr. Hilliard; per his recommendation; no change in antibiotic. Continue vancomycin and complete the course.   - BCx (-) 1/16 -> ABx 2 weeks -> end date 1/19   - ctm     Cervical Lymphadenopathy:  -Pt c/o of neck mass, had b/l palpable non tender LN ( posterior cervical region), no s/s of inflammation, skin intact   -afebrile, denies sweating, good appetite, no hepatosplenomegaly, no LN pawan in other regions of the  body  -will check CMP, LDH, ESR  -CXR 1/10:  Focal ill-defined opacity within the RIGHT upper lung may indicate focal inflammatory process or mass. May consider CT chest?   -HIV test: non reactive on 1/4  -likely reactive to orbital cellulitis, other possibilities: Lymphoma, systemic infection such as syphilis   -will f/u with PCP as outpt to make sure that LN.pawan is resolved       Hyperthyroidism  - TSH: 0.280 low ----> 2.3 wnl on 1/9  - Free T-4: 2.12 high  - no physical symptoms concerning hyperthyroidism; except HR >100, however, patient has fever with infection   - will need further workup once acute complaint is more stable.   - consider TSI/ US   - repeat TSH wnl; likely due to acute infectious process; will monitor.     Elevated liver enzymes  - 1/6 liver enzymes: AST: 53  ALT: 80 -->AST: 46, ALT: 74; trending down  - ?possibly from Ceftriaxone  - will monitor; pt asymptomatic    Bacteremia due to Staphylococcus  Blood and wound CX positive for MRSA  - ?from infected wound; pt denies IV drug use, UDS + for stimulants  - continue vancomycin for now; will need for around 2 weeks from day culture negative (1/5) per ID recs  - ECHO: normal; EF 65%, no valvular vegetations  - BCx (-) 1/16 -> ABx 2 weeks -> end date 1/19   - ctm     Anemia  Hb: on admission H/H: 14.1/45.8----> 10.8/33 1/16, MCV:  Iron: 27 low, TIBC: 344 wnl, % sat: 8 low  (?iron losses)  - pt reported heavy menstrual cycle this time  - also has active infection; likely inflammatory process causing drop in Hb  - will monitor; and if drop further; will consider full anemia workup

## 2017-01-18 ENCOUNTER — APPOINTMENT (OUTPATIENT)
Dept: RADIOLOGY | Facility: MEDICAL CENTER | Age: 32
DRG: 872 | End: 2017-01-18
Attending: INTERNAL MEDICINE
Payer: MEDICAID

## 2017-01-18 LAB
ALBUMIN SERPL BCP-MCNC: 3.6 G/DL (ref 3.2–4.9)
ALBUMIN/GLOB SERPL: 1.1 G/DL
ALP SERPL-CCNC: 112 U/L (ref 30–99)
ALT SERPL-CCNC: 205 U/L (ref 2–50)
ANION GAP SERPL CALC-SCNC: 7 MMOL/L (ref 0–11.9)
AST SERPL-CCNC: 90 U/L (ref 12–45)
BILIRUB SERPL-MCNC: 0.4 MG/DL (ref 0.1–1.5)
BUN SERPL-MCNC: 8 MG/DL (ref 8–22)
CALCIUM SERPL-MCNC: 8.7 MG/DL (ref 8.5–10.5)
CHLORIDE SERPL-SCNC: 102 MMOL/L (ref 96–112)
CO2 SERPL-SCNC: 24 MMOL/L (ref 20–33)
CREAT SERPL-MCNC: 0.51 MG/DL (ref 0.5–1.4)
ERYTHROCYTE [SEDIMENTATION RATE] IN BLOOD BY WESTERGREN METHOD: 33 MM/HOUR (ref 0–20)
GFR SERPL CREATININE-BSD FRML MDRD: >60 ML/MIN/1.73 M 2
GLOBULIN SER CALC-MCNC: 3.4 G/DL (ref 1.9–3.5)
GLUCOSE SERPL-MCNC: 105 MG/DL (ref 65–99)
LDH SERPL-CCNC: 189 U/L (ref 107–266)
POTASSIUM SERPL-SCNC: 4.3 MMOL/L (ref 3.6–5.5)
PROT SERPL-MCNC: 7 G/DL (ref 6–8.2)
SODIUM SERPL-SCNC: 133 MMOL/L (ref 135–145)

## 2017-01-18 PROCEDURE — 700102 HCHG RX REV CODE 250 W/ 637 OVERRIDE(OP): Performed by: HOSPITALIST

## 2017-01-18 PROCEDURE — A9270 NON-COVERED ITEM OR SERVICE: HCPCS | Performed by: INTERNAL MEDICINE

## 2017-01-18 PROCEDURE — 770021 HCHG ROOM/CARE - ISO PRIVATE

## 2017-01-18 PROCEDURE — 99232 SBSQ HOSP IP/OBS MODERATE 35: CPT | Mod: GC | Performed by: INTERNAL MEDICINE

## 2017-01-18 PROCEDURE — A9270 NON-COVERED ITEM OR SERVICE: HCPCS | Performed by: HOSPITALIST

## 2017-01-18 PROCEDURE — 700111 HCHG RX REV CODE 636 W/ 250 OVERRIDE (IP)

## 2017-01-18 PROCEDURE — A9270 NON-COVERED ITEM OR SERVICE: HCPCS | Performed by: EMERGENCY MEDICINE

## 2017-01-18 PROCEDURE — 85652 RBC SED RATE AUTOMATED: CPT

## 2017-01-18 PROCEDURE — 80053 COMPREHEN METABOLIC PANEL: CPT

## 2017-01-18 PROCEDURE — 76536 US EXAM OF HEAD AND NECK: CPT

## 2017-01-18 PROCEDURE — 700102 HCHG RX REV CODE 250 W/ 637 OVERRIDE(OP): Performed by: INTERNAL MEDICINE

## 2017-01-18 PROCEDURE — 700102 HCHG RX REV CODE 250 W/ 637 OVERRIDE(OP): Performed by: EMERGENCY MEDICINE

## 2017-01-18 PROCEDURE — 83615 LACTATE (LD) (LDH) ENZYME: CPT

## 2017-01-18 RX ADMIN — Medication 400 MG: at 08:01

## 2017-01-18 RX ADMIN — VANCOMYCIN HYDROCHLORIDE 1100 MG: 10 INJECTION, POWDER, LYOPHILIZED, FOR SOLUTION INTRAVENOUS at 17:12

## 2017-01-18 RX ADMIN — Medication 100 MG: at 08:01

## 2017-01-18 RX ADMIN — TOBRAMYCIN AND DEXAMETHASONE 1 DROP: 3; 1 SUSPENSION/ DROPS OPHTHALMIC at 15:57

## 2017-01-18 RX ADMIN — TOBRAMYCIN AND DEXAMETHASONE 1 DROP: 3; 1 SUSPENSION/ DROPS OPHTHALMIC at 12:00

## 2017-01-18 RX ADMIN — HYDROCODONE BITARTRATE AND ACETAMINOPHEN 2 TABLET: 5; 325 TABLET ORAL at 19:20

## 2017-01-18 RX ADMIN — TOBRAMYCIN AND DEXAMETHASONE 1 DROP: 3; 1 SUSPENSION/ DROPS OPHTHALMIC at 18:11

## 2017-01-18 RX ADMIN — VANCOMYCIN HYDROCHLORIDE 1100 MG: 10 INJECTION, POWDER, LYOPHILIZED, FOR SOLUTION INTRAVENOUS at 01:37

## 2017-01-18 RX ADMIN — TOBRAMYCIN AND DEXAMETHASONE 1 DROP: 3; 1 SUSPENSION/ DROPS OPHTHALMIC at 08:01

## 2017-01-18 RX ADMIN — TOBRAMYCIN AND DEXAMETHASONE 1 DROP: 3; 1 SUSPENSION/ DROPS OPHTHALMIC at 20:24

## 2017-01-18 RX ADMIN — HYDROCODONE BITARTRATE AND ACETAMINOPHEN 2 TABLET: 5; 325 TABLET ORAL at 08:02

## 2017-01-18 RX ADMIN — Medication 400 MG: at 20:24

## 2017-01-18 RX ADMIN — VANCOMYCIN HYDROCHLORIDE 1100 MG: 10 INJECTION, POWDER, LYOPHILIZED, FOR SOLUTION INTRAVENOUS at 09:06

## 2017-01-18 RX ADMIN — TOBRAMYCIN AND DEXAMETHASONE 1 DROP: 3; 1 SUSPENSION/ DROPS OPHTHALMIC at 13:55

## 2017-01-18 RX ADMIN — TOBRAMYCIN AND DEXAMETHASONE 1 DROP: 3; 1 SUSPENSION/ DROPS OPHTHALMIC at 10:09

## 2017-01-18 ASSESSMENT — ENCOUNTER SYMPTOMS
DOUBLE VISION: 0
WEIGHT LOSS: 0
GASTROINTESTINAL NEGATIVE: 1
NEUROLOGICAL NEGATIVE: 1
PSYCHIATRIC NEGATIVE: 1
RESPIRATORY NEGATIVE: 1
FEVER: 0
CARDIOVASCULAR NEGATIVE: 1
MUSCULOSKELETAL NEGATIVE: 1
HEADACHES: 0
PHOTOPHOBIA: 0
EYE DISCHARGE: 0
CHILLS: 0
SORE THROAT: 0
BLURRED VISION: 0

## 2017-01-18 ASSESSMENT — PAIN SCALES - GENERAL
PAINLEVEL_OUTOF10: 8
PAINLEVEL_OUTOF10: 8
PAINLEVEL_OUTOF10: 6

## 2017-01-18 NOTE — PROGRESS NOTES
Medicated per MAR except Pt refused lovenox and colace. Accessed PICC which is patent. Will continue hourly rounding throughout the day shift.

## 2017-01-18 NOTE — PROGRESS NOTES
"Pharmacy Kinetics 31 y.o. female on vancomycin day # 15 2017    Currently on Vancomycin 1100 mg iv q8hr    Indication for Treatment: facial cellulitis    Pertinent history per medical record: Admitted on 1/3/2017 for facial cellulitis.  Patient with left forehead swelling above eye.  She thought it was a pimple, but became much larger and painful.  CT of orbits with possible cellulitis.  Pt seen by ID and plan for IV vancomycin x 14 days from negative cultures.   is last negative culture, thus stop date is .    Other antibiotics: none    Allergies: Amoxicillin and Penicillins     List concerns for renal function: transaminitis (LFTs last checked )    Pertinent cultures to date:    17: blood peripheral - no growth to date   17: blood (peripheral) - no growth to date x2  17: wound (head) POS - methicillin resistant staphylococcus aureus, heavy growth  1/3/17: blood (peripheral) POS - gram positive cocci, possible staphylococcus sp. x2    Recent Labs      17   0425   WBC  6.1   NEUTSPOLYS  60.20     Recent Labs      17   0425   BUN  14   CREATININE  0.50     Recent Labs      01/15/17   0735   VANCOTROUGH  20.7*     Intake/Output Summary (Last 24 hours) at 17 1706  Last data filed at 17 0600   Gross per 24 hour   Intake   5100 ml   Output      0 ml   Net   5100 ml      Blood pressure 103/62, pulse 98, temperature 36.6 °C (97.8 °F), temperature source Temporal, resp. rate 17, height 1.575 m (5' 2.01\"), weight 68.9 kg (151 lb 14.4 oz), last menstrual period 2011, SpO2 99 %. Temp (24hrs), Av.9 °C (98.4 °F), Min:36.6 °C (97.8 °F), Max:37.3 °C (99.2 °F)      A/P   1. Vancomycin dose change: no change indicated  2. Next vancomycin level: 17 if iv antibiotics to continue   3. Goal trough: 12-16 mcg/mL  4. Comments: Renal indices stable. Continue Vanco 1100mg iv every 8 hours. Stop date is planned for 17    Nataliya Stephens,pharmD    "

## 2017-01-18 NOTE — PROGRESS NOTES
Assumed pt care at Shift change, received bedside report from NOC shift RN.  Pt in bed, AOx4.  Pt C/O pain, in neck; provided hot pack, MD aware.  POC discussed for Pt to continue care in hospital and Pt verbalized understanding. Bed in low position and locked, pt belongings and call light within reach. NO Bed alarm is on, Pt up self with steady gait. Pt instructed to call with any needs.  Pt verbalized understanding.

## 2017-01-18 NOTE — ASSESSMENT & PLAN NOTE
-Pt c/o of neck mass, had b/l palpable non tender LN ( posterior cervical region), no s/s of inflammation, skin intact   -afebrile, denies sweating, good appetite, no hepatosplenomegaly, no LN pawan in other regions of the body  -ESR: 33 high , LFT: AST: 90, ALT: 205, Alk phos: 112, LDH: 189 wnl,  HIV -ve, RPR: -ve, Hep B Ag: -ve   -US neck 1/18: Bilateral cervical adenopathy.  -CXR 1/10:  Focal ill-defined opacity within the RIGHT upper lung may indicate focal inflammatory process or mass. May consider CT chest?   -likely reactive to orbital cellulitis, other possibilities: Lymphoma  -will f/u with PCP as outpt to make sure that LN.pawan is resolved

## 2017-01-18 NOTE — PROGRESS NOTES
Received report from day shift RN. Discussed POC with pt., verbalized understanding, assumed care @1915. A&Ox4. On room air. Complaining of pain on left side of neck rated 6/10 on pain scale. Medicated appropriately per MAR. Pt had shower. Pt stated shower helps with pain on neck. On IV abx till 01/19. PICC line in place. CHG bath done. Up self, steady. Safety precautions in place; treaded socks on, call light within reach, personal belongings within reach, upper bed rails up.

## 2017-01-18 NOTE — CARE PLAN
Problem: Infection  Goal: Will remain free from infection  Outcome: PROGRESSING AS EXPECTED  Receiving IV abx    Problem: Pain Management  Goal: Pain level will decrease to patient’s comfort goal  Outcome: PROGRESSING AS EXPECTED  Complained of neck pain. Medicated appropriately per MAR.

## 2017-01-18 NOTE — PROGRESS NOTES
Pt is AOx4, complained of pain, medicated per MAR, tolerated all oral medications, ROSALIA PICC line in place--CDI, no s/s of infection, IV and opthalmic abx given, skin and sacral assessment done, call light in use, instructed to call for assistance, bed locked in low position, treaded socks on, plan of care discussed, all needs met at this time.

## 2017-01-18 NOTE — CARE PLAN
Problem: Safety  Goal: Will remain free from injury  Intervention: Provide assistance with mobility  Discussed with Pt the need to use the call light for any needs and for assistance with ambulation if the Pt begins to feel dizzy or weak.  Pt up self.  The Pt verbalized understanding.      Problem: Infection  Goal: Will remain free from infection  Discussed infection control tactics for in and out of the hospital.  Pt verbalized understanding.

## 2017-01-18 NOTE — CARE PLAN
Problem: Infection  Goal: Will remain free from infection  Outcome: PROGRESSING AS EXPECTED  Pt admitted for facial cellulitis, IV/ opthalmic abx on board, PICC line in place, no s/s of infection noted, will continue to monitor for any s/s of infection    Problem: Pain Management  Goal: Pain level will decrease to patient’s comfort goal  Outcome: PROGRESSING AS EXPECTED  Pt complained of pain, medicated with PRN pain medications see MAR, will continue to monitor for pain

## 2017-01-19 ENCOUNTER — APPOINTMENT (OUTPATIENT)
Dept: RADIOLOGY | Facility: MEDICAL CENTER | Age: 32
DRG: 872 | End: 2017-01-19
Attending: INTERNAL MEDICINE
Payer: MEDICAID

## 2017-01-19 VITALS
SYSTOLIC BLOOD PRESSURE: 109 MMHG | TEMPERATURE: 98.9 F | HEART RATE: 84 BPM | DIASTOLIC BLOOD PRESSURE: 74 MMHG | BODY MASS INDEX: 27.95 KG/M2 | WEIGHT: 151.9 LBS | HEIGHT: 62 IN | RESPIRATION RATE: 18 BRPM | OXYGEN SATURATION: 98 %

## 2017-01-19 LAB
ALBUMIN SERPL BCP-MCNC: 3.5 G/DL (ref 3.2–4.9)
ALBUMIN/GLOB SERPL: 1.1 G/DL
ALP SERPL-CCNC: 121 U/L (ref 30–99)
ALT SERPL-CCNC: 173 U/L (ref 2–50)
ANION GAP SERPL CALC-SCNC: 10 MMOL/L (ref 0–11.9)
AST SERPL-CCNC: 63 U/L (ref 12–45)
BASOPHILS # BLD AUTO: 0.5 % (ref 0–1.8)
BASOPHILS # BLD: 0.02 K/UL (ref 0–0.12)
BILIRUB SERPL-MCNC: 0.3 MG/DL (ref 0.1–1.5)
BUN SERPL-MCNC: 10 MG/DL (ref 8–22)
CALCIUM SERPL-MCNC: 8.8 MG/DL (ref 8.5–10.5)
CHLORIDE SERPL-SCNC: 102 MMOL/L (ref 96–112)
CO2 SERPL-SCNC: 25 MMOL/L (ref 20–33)
CREAT SERPL-MCNC: 0.53 MG/DL (ref 0.5–1.4)
EOSINOPHIL # BLD AUTO: 0.27 K/UL (ref 0–0.51)
EOSINOPHIL NFR BLD: 7.1 % (ref 0–6.9)
ERYTHROCYTE [DISTWIDTH] IN BLOOD BY AUTOMATED COUNT: 42.6 FL (ref 35.9–50)
GFR SERPL CREATININE-BSD FRML MDRD: >60 ML/MIN/1.73 M 2
GLOBULIN SER CALC-MCNC: 3.3 G/DL (ref 1.9–3.5)
GLUCOSE SERPL-MCNC: 99 MG/DL (ref 65–99)
HCT VFR BLD AUTO: 34 % (ref 37–47)
HGB BLD-MCNC: 11.5 G/DL (ref 12–16)
IMM GRANULOCYTES # BLD AUTO: 0.02 K/UL (ref 0–0.11)
IMM GRANULOCYTES NFR BLD AUTO: 0.5 % (ref 0–0.9)
LYMPHOCYTES # BLD AUTO: 1.26 K/UL (ref 1–4.8)
LYMPHOCYTES NFR BLD: 33.2 % (ref 22–41)
MCH RBC QN AUTO: 28.8 PG (ref 27–33)
MCHC RBC AUTO-ENTMCNC: 33.8 G/DL (ref 33.6–35)
MCV RBC AUTO: 85.2 FL (ref 81.4–97.8)
MONOCYTES # BLD AUTO: 0.41 K/UL (ref 0–0.85)
MONOCYTES NFR BLD AUTO: 10.8 % (ref 0–13.4)
NEUTROPHILS # BLD AUTO: 1.82 K/UL (ref 2–7.15)
NEUTROPHILS NFR BLD: 47.9 % (ref 44–72)
NRBC # BLD AUTO: 0 K/UL
NRBC BLD AUTO-RTO: 0 /100 WBC
PLATELET # BLD AUTO: 294 K/UL (ref 164–446)
PMV BLD AUTO: 8.4 FL (ref 9–12.9)
POTASSIUM SERPL-SCNC: 4.2 MMOL/L (ref 3.6–5.5)
PROT SERPL-MCNC: 6.8 G/DL (ref 6–8.2)
RBC # BLD AUTO: 3.99 M/UL (ref 4.2–5.4)
SODIUM SERPL-SCNC: 137 MMOL/L (ref 135–145)
WBC # BLD AUTO: 3.8 K/UL (ref 4.8–10.8)

## 2017-01-19 PROCEDURE — 80053 COMPREHEN METABOLIC PANEL: CPT

## 2017-01-19 PROCEDURE — 85025 COMPLETE CBC W/AUTO DIFF WBC: CPT

## 2017-01-19 PROCEDURE — A9270 NON-COVERED ITEM OR SERVICE: HCPCS | Performed by: HOSPITALIST

## 2017-01-19 PROCEDURE — A9270 NON-COVERED ITEM OR SERVICE: HCPCS | Performed by: INTERNAL MEDICINE

## 2017-01-19 PROCEDURE — 99239 HOSP IP/OBS DSCHRG MGMT >30: CPT | Mod: GC | Performed by: INTERNAL MEDICINE

## 2017-01-19 PROCEDURE — 700102 HCHG RX REV CODE 250 W/ 637 OVERRIDE(OP): Performed by: INTERNAL MEDICINE

## 2017-01-19 PROCEDURE — 76700 US EXAM ABDOM COMPLETE: CPT

## 2017-01-19 PROCEDURE — 700102 HCHG RX REV CODE 250 W/ 637 OVERRIDE(OP): Performed by: HOSPITALIST

## 2017-01-19 RX ORDER — ACETAMINOPHEN 325 MG/1
650 TABLET ORAL EVERY 6 HOURS PRN
Qty: 30 TAB | Refills: 0 | Status: SHIPPED | OUTPATIENT
Start: 2017-01-19 | End: 2017-11-14

## 2017-01-19 RX ADMIN — TOBRAMYCIN AND DEXAMETHASONE 1 DROP: 3; 1 SUSPENSION/ DROPS OPHTHALMIC at 10:04

## 2017-01-19 RX ADMIN — TOBRAMYCIN AND DEXAMETHASONE 1 DROP: 3; 1 SUSPENSION/ DROPS OPHTHALMIC at 12:12

## 2017-01-19 RX ADMIN — Medication 100 MG: at 08:46

## 2017-01-19 RX ADMIN — ACETAMINOPHEN 650 MG: 325 TABLET, FILM COATED ORAL at 04:55

## 2017-01-19 RX ADMIN — Medication 400 MG: at 08:46

## 2017-01-19 RX ADMIN — TOBRAMYCIN AND DEXAMETHASONE 1 DROP: 3; 1 SUSPENSION/ DROPS OPHTHALMIC at 04:56

## 2017-01-19 RX ADMIN — TOBRAMYCIN AND DEXAMETHASONE 1 DROP: 3; 1 SUSPENSION/ DROPS OPHTHALMIC at 07:27

## 2017-01-19 ASSESSMENT — PAIN SCALES - GENERAL
PAINLEVEL_OUTOF10: 6
PAINLEVEL_OUTOF10: 4

## 2017-01-19 NOTE — PROGRESS NOTES
Received report from day shift RN. Discussed POC with pt., verbalized understanding, assumed care @1915. A&Ox4. On room air. Complaining of neck pain on L side of neck rated 8/10 on pain scale. Medicated appropriately per MAR. +1 swelling noted. No redness noted, skin intact. PICC line in place. CHG bath done. Pt completed abx course. Will be NPO @midnight for abdominal ultrasound.  Had shower. Safety precautions in place; treaded socks on, call light within reach, personal belongings within reach, upper bed rails up.

## 2017-01-19 NOTE — DISCHARGE PLANNING
Medical Social Work  PC to Scheduling, requested they schedule an appointment for PCP at [facility], but unsure if they will accept as patient is pending medical.  Told they don't but can find a doctor or a APRN with in the next two weeks.   PC from Scheduling patient has an appointment with TODD Tomas on 1/24 at 9:00, this information and address will be in her d/c summary.

## 2017-01-19 NOTE — PROGRESS NOTES
[facility] Internal Medicine Interval Note    Name Rola Carrillo 1985   Age/Sex 31 y.o. female   MRN [id number]   Code Status Full     After 5PM or if no immediate response to page, please call for cross-coverage  Attending/Team: Douglas/Dr. Apple Call [phone] to page   1st Call - Day Intern (R1):   Dr. Cameron  2nd Call - Day Sr. Resident (R2/R3):   Dr. Little         Chief complaint/ reason for interval visit (Primary Diagnosis)   Left pre and post septal orbital cellulitis and left forehead    Interval Problem Daily Status Update    - Patient feeling good; Left eye significantly improved;   - stable &  BCx (-)  -> ABx 2 weeks -> end date    -Pt c/o of neck mass, had b/l palpable non tender LN ( posterior cervical region), no s/s of inflammation, skin intact.   US neck : Bilateral cervical adenopathy. ESR: 33 high , LFT: AST: 90, ALT: 205, Alk phos: 112, LDH: 189 wnl,  HIV -ve, RPR: -ve, Hep B Ag: -ve   -PICC to RUE with no s/s of infection       Active Hospital Problems    Diagnosis   • Hyperthyroidism [E05.90] repeat tsh improved   • Cellulitis of left orbit [H05.012] improving       Review of Systems   Constitutional: Negative for fever, chills and weight loss.   HENT: Negative for congestion, nosebleeds and sore throat.    Eyes: Negative for blurred vision, double vision, photophobia and discharge.        Left eyelid now normal  Right eye wnl   Respiratory: Negative.    Cardiovascular: Negative.    Gastrointestinal: Negative.    Genitourinary: Negative.    Musculoskeletal: Negative.    Neurological: Negative.  Negative for headaches.   Endo/Heme/Allergies: Negative.    Psychiatric/Behavioral: Negative.        Consultants/Specialty  Ophthalmology  Infectious Disease: Dr. Hilliard    Disposition  Inpatient being treated for orbital cellulitis    Quality Measures  Radiology images reviewed, Labs reviewed and Medications reviewed  Castellanos catheter: No  Emanuel      DVT Prophylaxis: Enoxaparin (Lovenox)                  Physical Exam       Filed Vitals:    01/17/17 1848 01/18/17 0400 01/18/17 0800 01/18/17 1523   BP: 107/59 100/65 108/65 100/55   Pulse: 106 62 103 98   Temp: 36.7 °C (98 °F) 37.2 °C (99 °F) 37.2 °C (98.9 °F) 37 °C (98.6 °F)   TempSrc:       Resp: 17 17 18 18   Height:       Weight:       SpO2: 100% 97% 95% 98%     Body mass index is 27.78 kg/(m^2).    Oxygen Therapy:  Pulse Oximetry: 98 %, O2 (LPM): 0, O2 Delivery: None (Room Air)    Physical Exam   Constitutional: She is oriented to person, place, and time and well-developed, well-nourished, and in no distress. No distress.   HENT:   Head: Normocephalic and atraumatic.   Eyes: Right eye exhibits no discharge. Left eye exhibits no discharge. Right conjunctiva is not injected. Right conjunctiva has no hemorrhage. Left conjunctiva has no hemorrhage. No scleral icterus. Right eye exhibits normal extraocular motion and no nystagmus. Left eye exhibits normal extraocular motion and no nystagmus. Right pupil is round and reactive. Left pupil is round and reactive.       EOM Intact of both eyes   Neck: Normal range of motion. Neck supple. No thyromegaly present.   B/l posterior cervical LN pawan , mildly tender    Cardiovascular: Regular rhythm and normal heart sounds.    No murmur heard.  Pulmonary/Chest: Effort normal and breath sounds normal.   Abdominal: Soft. Bowel sounds are normal.   Neurological: She is alert and oriented to person, place, and time.   Skin: She is not diaphoretic.   Psychiatric: Affect normal.   Vitals reviewed.        Lab Data Review:      1/4/2017  5:49 PM    Recent Labs      01/16/17   0425  01/18/17   0300   SODIUM  136  133*   POTASSIUM  4.0  4.3   CHLORIDE  102  102   CO2  26  24   BUN  14  8   CREATININE  0.50  0.51   CALCIUM  8.9  8.7       Recent Labs      01/16/17   0425  01/18/17   0300   ALTSGPT   --   205*   ASTSGOT   --   90*   ALKPHOSPHAT   --   112*   TBILIRUBIN   --    0.4   GLUCOSE  97  105*       Recent Labs      01/16/17   0425   RBC  3.80*   HEMOGLOBIN  10.8*   HEMATOCRIT  33.0*   PLATELETCT  347       Recent Labs      01/16/17   0425  01/18/17   0300   WBC  6.1   --    NEUTSPOLYS  60.20   --    LYMPHOCYTES  25.80   --    MONOCYTES  9.30   --    EOSINOPHILS  2.90   --    BASOPHILS  0.70   --    ASTSGOT   --   90*   ALTSGPT   --   205*   ALKPHOSPHAT   --   112*   TBILIRUBIN   --   0.4           Assessment/Plan     Cellulitis of left orbit  Assessment:  -Left upper face swelling/erythema involving upper eyelid and eye  - On admission: acute onset x 2 days after pimple appeared on the left side of forehead; progressively involved the left eyelid with swelling and unable to open the eye    - on admission: grossly intact extra-ocular muscles movement; with normal vision 20/23 with hand-held snellen chart    - on admission: WBC count: 17.6, VS: /76, pulse 83, temp 36.5 °C (97.7 °F), SpO2 100 %. RA   - tonometry in ED: 12 RE, 24 LE  - CT orbits: orbital cellulitis  - 1/4/17: worsening swelling of eyelid; EOM restricted (partly due to worsening chemosis), Vision intact 20/30, light reflex normal; ophthalmology on board; erythema has reduced and regressed from the initial marking; also purulent substance from the pustule, sent for culture.    - Ophthalmology following closely: started patient on tobramycin-dexamethasone drops q2hrs  - MR head w/wo: Impression: mild orbital and periorbital cellulitis, mild proptosis, no abscess, no evidence of cavernous sinus thrombosis.   - continued on vancomycin and d/c ceftriaxone given staph aureus; MRSA + in both wound and blood  - 1/8: significant improvement in the left eye, able to open completely, swelling resolved; chemosis improved.   PLAN:   - continue Vancomycin per ID recs  - ECHO: wnl, EF: 65%, no valvular vegetation  -  repeat BCx 1/5: no growth  - 14 days IV vancomycin from 1/5 till 1/19  - eye exam improved  - tylenol prn for  pain and fever.  - PICC line placed 1/10;   - 1/11: Dr. Hilliard; per his recommendation; no change in antibiotic. Continue vancomycin and complete the course.   - BCx (-) 1/16 -> ABx 2 weeks -> end date 1/19   - ctm     Cervical Lymphadenopathy:  -Pt c/o of neck mass, had b/l palpable non tender LN ( posterior cervical region), no s/s of inflammation, skin intact   -afebrile, denies sweating, good appetite, no hepatosplenomegaly, no LN pawan in other regions of the body  -ESR: 33 high , LFT: AST: 90, ALT: 205, Alk phos: 112, LDH: 189 wnl,  HIV -ve, RPR: -ve, Hep B Ag: -ve   -US neck 1/18: Bilateral cervical adenopathy.  -CXR 1/10:  Focal ill-defined opacity within the RIGHT upper lung may indicate focal inflammatory process or mass. May consider CT chest?   -likely reactive to orbital cellulitis, other possibilities: Lymphoma  -will f/u with PCP as outpt to make sure that LN.pawan is resolved       Hyperthyroidism  - TSH: 0.280 low ----> 2.3 wnl on 1/9  - Free T-4: 2.12 high  - no physical symptoms concerning hyperthyroidism; except HR >100 st, however, patient has fever with infection   - repeat TSH wnl; likely due to acute infectious process; will monitor.     Elevated liver enzymes  - 1/6 liver enzymes: AST: 53--->90 high,  ALT: 80 -->205 high, Alk phosph: 84-->112 high  -hep B Ag: -ve  , not alcoholic   -US abdomen/liver ordered   - will monitor; pt asymptomatic      Bacteremia due to Staphylococcus  Blood and wound CX positive for MRSA  - ?from infected wound; pt denies IV drug use, UDS + for stimulants  - continue vancomycin for now; will need for around 2 weeks from day culture negative (1/5) per ID recs  - ECHO: normal; EF 65%, no valvular vegetations  - BCx (-) 1/16 -> ABx 2 weeks -> end date 1/19   - ctm     Anemia  Hb: on admission H/H: 14.1/45.8----> 10.8/33 1/16, MCV:  Iron: 27 low, TIBC: 344 wnl, % sat: 8 low  (?iron losses)  - pt reported heavy menstrual cycle this time  - also has active infection;  likely inflammatory process causing drop in Hb  - will monitor; and if drop further; will consider full anemia workup

## 2017-01-19 NOTE — CARE PLAN
Problem: Discharge Barriers/Planning  Goal: Patient’s continuum of care needs will be met  Outcome: PROGRESSING AS EXPECTED  Pt completed abx course. For possible d/c tomorrow.     Problem: Pain Management  Goal: Pain level will decrease to patient’s comfort goal  Outcome: PROGRESSING AS EXPECTED  Complained of pain on L side of neck rated 8/10 on pain scale. Medicated appropriately per MAR.

## 2017-01-19 NOTE — DISCHARGE INSTRUCTIONS
Discharge Instructions    Discharged to home by car with relative. Discharged via walking, hospital escort: Yes.  Special equipment needed: Not Applicable    Be sure to schedule a follow-up appointment with your primary care doctor or any specialists as instructed.     Discharge Plan:   Diet Plan: Discussed (regular)  Activity Level: Discussed (as tolerated)  Confirmed Follow up Appointment: Patient to Call and Schedule Appointment  Confirmed Symptoms Management: Discussed  Medication Reconciliation Updated: Yes  Influenza Vaccine Indication: Patient Refuses    I understand that a diet low in cholesterol, fat, and sodium is recommended for good health. Unless I have been given specific instructions below for another diet, I accept this instruction as my diet prescription.   Other diet: regular    Special Instructions: None    · Is patient discharged on Warfarin / Coumadin?   No     · Is patient Post Blood Transfusion?  No    Depression / Suicide Risk    As you are discharged from this [facility] facility, it is important to learn how to keep safe from harming yourself.    Recognize the warning signs:  · Abrupt changes in personality, positive or negative- including increase in energy   · Giving away possessions  · Change in eating patterns- significant weight changes-  positive or negative  · Change in sleeping patterns- unable to sleep or sleeping all the time   · Unwillingness or inability to communicate  · Depression  · Unusual sadness, discouragement and loneliness  · Talk of wanting to die  · Neglect of personal appearance   · Rebelliousness- reckless behavior  · Withdrawal from people/activities they love  · Confusion- inability to concentrate     If you or a loved one observes any of these behaviors or has concerns about self-harm, here's what you can do:  · Talk about it- your feelings and reasons for harming yourself  · Remove any means that you might use to hurt yourself (examples: pills, rope,  extension cords, firearm)  · Get professional help from the community (Mental Health, Substance Abuse, psychological counseling)  · Do not be alone:Call your Safe Contact- someone whom you trust who will be there for you.  · Call your local CRISIS HOTLINE 828-6638 or 074-030-1794  · Call your local Children's Mobile Crisis Response Team [place] (657) 789-7695 or www.Fifty100  · Call the toll free National Suicide Prevention Hotlines   · National Suicide Prevention Lifeline 731-217-HJQN (7366)  · [employer] Network 800-SUICIDE (556-5498)        Cellulitis  Cellulitis is an infection of the skin and the tissue beneath it. The infected area is usually red and tender. Cellulitis occurs most often in the arms and lower legs.   CAUSES   Cellulitis is caused by bacteria that enter the skin through cracks or cuts in the skin. The most common types of bacteria that cause cellulitis are staphylococci and streptococci.  SIGNS AND SYMPTOMS   · Redness and warmth.  · Swelling.  · Tenderness or pain.  · Fever.  DIAGNOSIS   Your health care provider can usually determine what is wrong based on a physical exam. Blood tests may also be done.  TREATMENT   Treatment usually involves taking an antibiotic medicine.  HOME CARE INSTRUCTIONS   · Take your antibiotic medicine as directed by your health care provider. Finish the antibiotic even if you start to feel better.  · Keep the infected arm or leg elevated to reduce swelling.  · Apply a warm cloth to the affected area up to 4 times per day to relieve pain.  · Take medicines only as directed by your health care provider.  · Keep all follow-up visits as directed by your health care provider.  SEEK MEDICAL CARE IF:   · You notice red streaks coming from the infected area.  · Your red area gets larger or turns dark in color.  · Your bone or joint underneath the infected area becomes painful after the skin has healed.  · Your infection returns in the same area or  another area.  · You notice a swollen bump in the infected area.  · You develop new symptoms.  · You have a fever.  SEEK IMMEDIATE MEDICAL CARE IF:   · You feel very sleepy.  · You develop vomiting or diarrhea.  · You have a general ill feeling (malaise) with muscle aches and pains.  MAKE SURE YOU:   · Understand these instructions.  · Will watch your condition.  · Will get help right away if you are not doing well or get worse.     This information is not intended to replace advice given to you by your health care provider. Make sure you discuss any questions you have with your health care provider.     Document Released: 09/27/2006 Document Revised: 01/08/2016 Document Reviewed: 03/04/2013  [employer] Interactive Patient Education ©2016 [employer]      MRSA Infection, Adult  MRSA stands for methicillin-resistant Staphylococcus aureus. This type of infection is caused by Staphylococcus aureus bacteria that are no longer affected by the medicines used to kill them (drug resistant). Staphylococcus (staph) bacteria are normally found on the skin or in the nose of healthy people. In most cases, these bacteria do not cause infection. But if these resistant bacteria enter your body through a cut or sore, they can cause a serious infection on your skin or in other parts of your body. There is a slight chance that the staph on your skin or in your nose is MRSA.  There are two types of MRSA infections:  · Hospital-acquired MRSA is bacteria that you get in the hospital.  · Community-acquired MRSA is bacteria that you get somewhere other than in a hospital.  RISK FACTORS  Hospital-acquired MRSA is more common. You could be at risk for this infection if you are in the hospital and you:  · Have surgery or a procedure.  · Have an IV access or a catheter tube placed in your body.  · Have weak resistance to germs (weakened immune system).  · Are elderly.  · Are on kidney dialysis.  You could be at risk for community-acquired MRSA  if you have a break in your skin and come into contact with MRSA. This may happen if you:  · Play sports where there is skin-to-skin contact.  · Live in a crowded setting, like a dormitory or a  barracks.  · Share towels, razors, or sports equipment with other people.  SYMPTOMS   Symptoms of hospital-acquired MRSA depend on where MRSA has spread. Symptoms may include:  · Wound infection.  · Skin infection.  · Rash.  · Pneumonia.  · Fever and chills.  · Difficulty breathing.  · Chest pain.   Community-acquired MRSA is most likely to start as a scratch or cut that becomes infected. Symptoms may include:  · A pus-filled pimple.  · A boil on your skin.  · Pus draining from your skin.  · A sore (abscess) under your skin or somewhere in your body.  · Fever with or without chills.  DIAGNOSIS   The diagnosis of MRSA is made by taking a sample from an infected area and sending it to a lab for testing. A  can grow (culture) MRSA and check it under a microscope. The cultured MRSA can be tested to see which type of antibiotic medicine will work to treat it. Newer tests can identify MRSA more quickly by testing bacteria samples for MRSA genes. Your health care provider can diagnose MRSA using samples from:   · Cuts or wounds in infected areas.  · Nasal swabs.  · Saliva or cough specimens from deep in the lungs (sputum).  · Urine.  · Blood.  You may also have:  · Imaging studies (such as X-ray or MRI) to check if the infection has spread to the lungs, bones, or joints.  · A culture and sensitivity test of blood or fluids from inside the joints.  TREATMENT   Treatment depends on how severe, deep, or extensive the infection is. Very bad infections may require a hospital stay.  · Some skin infections, such as a small boil or sore (abscess), may be treated by draining pus from the site of the infection.  · More extensive surgery to drain pus may be necessary for deeper or more widespread soft tissue  infections.  · You may then have to take antibiotic medicine given by mouth or through a vein. You may start antibiotic treatment right away or after testing can be done to see what antibiotic medicine should be used.  HOME CARE INSTRUCTIONS   · Take your antibiotics as directed by your health care provider. Take the medicine as prescribed until it is finished.  · Avoid close contact with those around you as much as possible. Do not use towels, razors, toothbrushes, bedding, or other items that will be used by others.  · Wash your hands frequently for 15 seconds with soap and water. Dry your hands with a clean or disposable towel.  · When you are not able to wash your hands, use hand  that is more than 60 percent alcohol.  · Wash towels, sheets, or clothes in the washing machine with detergent and hot water. Dry them in a hot dryer.  · Follow your health care provider's instructions for wound care. Wash your hands before and after changing your bandages.  · Always shower after exercising.  · Keep all cuts and scrapes clean and covered with a bandage.  · Be sure to tell all your health care providers that you have MRSA so they are aware of your infection.  SEEK MEDICAL CARE IF:  · You have a cut, scrape, pimple, or boil that becomes red, swollen, or painful or has pus in it.  · You have pus draining from your skin.  · You have an abscess under your skin or somewhere in your body.  SEEK IMMEDIATE MEDICAL CARE IF:   · You have symptoms of a skin infection with a fever or chills.  · You have trouble breathing.  · You have chest pain.  · You have a skin wound and you become nauseous or start vomiting.  MAKE SURE YOU:  · Understand these instructions.  · Will watch your condition.  · Will get help right away if you are not doing well or get worse.     This information is not intended to replace advice given to you by your health care provider. Make sure you discuss any questions you have with your health care  provider.     Document Released: 12/18/2006 Document Revised: 05/03/2016 Document Reviewed: 10/10/2014  [employer] Interactive Patient Education ©2016 [employer]vier Inc.

## 2017-01-19 NOTE — PROGRESS NOTES
Discharging patient home per physician order.  Discharged with relative.  Demonstrated understanding of discharge instructions, follow up appointments, home medications, prescriptions.  Ambulating with no assistance, voiding without difficulty, pain well controlled, tolerating oral medications, oxygen saturation greater than 90% , tolerating diet.   Educational handouts about cellulitis and MRSA given and discussed.  Verbalized understanding of discharge instructions and educational handouts.  All questions answered.  Belongings with patient at time of discharge.

## 2017-01-19 NOTE — PROGRESS NOTES
Pt is AOx4, no complains of pain, tolerated all oral medications this AM, PICC line still in place, IV abx course finished, for possible DC today, US of abdomen done, skin and sacral assessment done, treaded socks on, call light in use, instructed to call for assistance, bed locked in low position, plan of care discussed, all needs met at this time.

## 2017-01-23 NOTE — DOCUMENTATION QUERY
DOCUMENTATION QUERY    PROVIDERS: Please select “Cosign w/ note”to reply to query.    To better represent the severity of illness of your patient, please review the following information and exercise your independent professional judgment in responding to this query.     Bacteremia is documented in the Progress Notes and Discharge Summary. Both blood culture and wound culture were positive for MRSA. Bacteremia is defined as bacteria growing in the blood.Based upon the clinical findings, risk factors, and treatment, can this diagnosis be further specified?          The medical record reflects the following:   Clinical Findings  Elevated WBC, Positive blood culture   Treatment  IV antibiotics   Risk Factors  Cellulitis forehead and orbit, amphetamine abise   Location within medical record  Progress Notes and Discharge Summary     Thank you,   Yanet Tamayo, CCS

## 2017-11-14 ENCOUNTER — HOSPITAL ENCOUNTER (EMERGENCY)
Facility: MEDICAL CENTER | Age: 32
End: 2017-11-14

## 2017-11-14 VITALS
TEMPERATURE: 97.9 F | HEIGHT: 62 IN | RESPIRATION RATE: 16 BRPM | HEART RATE: 97 BPM | SYSTOLIC BLOOD PRESSURE: 124 MMHG | DIASTOLIC BLOOD PRESSURE: 73 MMHG | OXYGEN SATURATION: 100 % | BODY MASS INDEX: 24.84 KG/M2 | WEIGHT: 135 LBS

## 2017-11-14 PROCEDURE — 302449 STATCHG TRIAGE ONLY (STATISTIC)

## 2017-11-14 ASSESSMENT — LIFESTYLE VARIABLES: DO YOU DRINK ALCOHOL: NO

## 2017-11-14 NOTE — ED NOTES
Pt ambulates to room requesting STD check.  Pt states she has been having sex with a girl who was diagnosed with gonorrhea & chlamydia last week.  A&ox4.  Denies vaginal discharge.  Chart up for ERP evaluation.    Pt advised importance of providing a urine sample- refuses at this time.    Explained to pt the importance of putting on hospital gown.

## 2020-02-14 NOTE — PROGRESS NOTES
[facility] Internal Medicine Interval Note    Name Rola Carrillo 1985   Age/Sex 31 y.o. female   MRN [id number]   Code Status Full     After 5PM or if no immediate response to page, please call for cross-coverage  Attending/Team: Douglas/Dr. Apple Call [phone] to page   1st Call - Day Intern (R1):   Dr. Ruiz 2nd Call - Day Sr. Resident (R2/R3):   Dr. Little         Chief complaint/ reason for interval visit (Primary Diagnosis)   Left pre and post septal orbital cellulitis and left forehead    Interval Problem Daily Status Update    - patient febrile today  - erythema decreased but swelling in eye worsened with worsening chemosis  - ophthalmology on board  - MR: consistent with findings of orbital cellulitis; no evidence of cavernous sinus thrombosis; no vision change  - patient somnolent but arousable; likely withdrawal from stimulant abuse (+ in UDS)  Active Hospital Problems    Diagnosis   • Hyperthyroidism [E05.90]   • Cellulitis of left orbit [H05.012]       Review of Systems   Constitutional: Positive for fever. Negative for chills and weight loss.   HENT: Negative for congestion, nosebleeds and sore throat.    Eyes: Positive for pain and redness. Negative for blurred vision, double vision, photophobia and discharge.        Left eyelid unable to open  Right eye wnl   Respiratory: Negative.    Cardiovascular: Negative.    Gastrointestinal: Negative.    Genitourinary: Negative.    Musculoskeletal: Negative.    Skin:        Erythema of left upper face and periorbital region   Neurological: Negative.  Negative for headaches.   Endo/Heme/Allergies: Negative.    Psychiatric/Behavioral: Negative.        Consultants/Specialty  Ophthalmology    Disposition  Inpatient being treated for orbital cellulitis    Quality Measures  Radiology images reviewed, Labs reviewed and Medications reviewed  Castellanos catheter: No Castellanos      DVT Prophylaxis: Enoxaparin  Frequent thirst and urination for past month / evaluated at PMD this am and sent to ED for evaluation (Lovenox)                  Physical Exam       Filed Vitals:    01/04/17 0400 01/04/17 0749 01/04/17 1600 01/04/17 1744   BP: 110/62 110/70 136/75    Pulse: 108 94 107    Temp: 36.6 °C (97.8 °F) 38.3 °C (101 °F) 38.8 °C (101.8 °F) 37.4 °C (99.3 °F)   TempSrc:       Resp: 18 16 20    Height:       Weight:       SpO2: 98% 98% 99%      Body mass index is 27.78 kg/(m^2).    Oxygen Therapy:  Pulse Oximetry: 99 %, O2 (LPM): 0, O2 Delivery: None (Room Air)    Physical Exam   Constitutional: She is oriented to person, place, and time and well-developed, well-nourished, and in no distress. No distress.   HENT:   Head: Normocephalic and atraumatic.   Eyes: Right eye exhibits no discharge. Left eye exhibits chemosis. Left eye exhibits no discharge. Right conjunctiva is not injected. Right conjunctiva has no hemorrhage. Left conjunctiva is injected. Left conjunctiva has no hemorrhage. No scleral icterus. Right eye exhibits normal extraocular motion and no nystagmus. Left eye exhibits abnormal extraocular motion. Left eye exhibits no nystagmus. Right pupil is round and reactive. Left pupil is round and reactive.       Restricted on left but normal on right   Neck: Normal range of motion. Neck supple. No thyromegaly present.   Cardiovascular: Regular rhythm and normal heart sounds.    No murmur heard.  Pulmonary/Chest: Effort normal and breath sounds normal.   Abdominal: Soft. Bowel sounds are normal.   Neurological: She is alert and oriented to person, place, and time.   somnolent   Skin: She is not diaphoretic.   Psychiatric: Affect normal.   Vitals reviewed.        Lab Data Review:      1/4/2017  5:49 PM    Recent Labs      01/03/17   1055  01/04/17   0208   SODIUM  134*  131*   POTASSIUM  4.0  3.5*   CHLORIDE  101  101   CO2  22  21   BUN  5*  5*   CREATININE  0.54  0.55   MAGNESIUM  1.8   --    CALCIUM  9.5  8.6       Recent Labs      01/03/17   1055  01/04/17   0208   ALTSGPT  8  9   ASTSGOT  13  9*   ALKPHOSPHAT  80  65    TBILIRUBIN  0.5  0.6   GLUCOSE  124*  109*       Recent Labs      01/03/17   1055  01/04/17   0208   RBC  5.05  4.32   HEMOGLOBIN  14.1  12.2   HEMATOCRIT  45.8  37.3   PLATELETCT  266  234       Recent Labs      01/03/17   1055  01/04/17   0208   WBC  17.6*  14.4*   NEUTSPOLYS  88.60*  92.40*   LYMPHOCYTES  4.70*  3.30*   MONOCYTES  6.00  3.30   EOSINOPHILS  0.10  0.10   BASOPHILS  0.30  0.20   ASTSGOT  13  9*   ALTSGPT  8  9   ALKPHOSPHAT  80  65   TBILIRUBIN  0.5  0.6           Assessment/Plan     Cellulitis of left orbit  Assessment & Plan  Assessment:  -Left upper face swelling/erythema involving upper eyelid and eye  - On admission: acute onset x 2 days after pimple appeared on the left side of forehead; progressively involved the left eyelid with swelling and unable to open the eye    - on admission: grossly intact extra-ocular muscles movement; with normal vision 20/23 with hand-held snellen chart    - on admission: WBC count: 17.6, VS: /76, pulse 83, temp 36.5 °C (97.7 °F), SpO2 100 %. RA   - tonometry in ED: 12 RE, 24 LE  - CT orbits: orbital cellulitis  - 1/4/17: worsening swelling of eyelid; EOM restricted (partly due to worsening chemosis), Vision intact 20/30, light reflex normal; ophthalmology on board; erythema has reduced and regressed from the initial marking; also purulent substance from the pustule, sent for culture.   Plan:  -  pending Blood Cultures    - On C3 and Vancomycin;   - Ophthalmology following closely: started patient on tobramycin-dexamethasone drops q2hrs  - ordered MR head w/wo: Impression: mild orbital and periorbital cellulitis, mild proptosis, no abscess, no evidence of cavernous sinus thrombosis.   - regular eye exam; update ophthalmology with any changes.   - tylenol prn for pain and fever.  - ctm         Hyperthyroidism  Assessment & Plan  - TSH: 0.280  - Free T-4: 2.12  - no physical symptoms concerning hyperthyroidism; except HR >100, however, patient has fever with  infectino  - will need further workup once acute complaint is more stable.

## 2020-04-21 ENCOUNTER — HOSPITAL ENCOUNTER (EMERGENCY)
Facility: MEDICAL CENTER | Age: 35
End: 2020-04-21
Attending: EMERGENCY MEDICINE

## 2020-04-21 VITALS
DIASTOLIC BLOOD PRESSURE: 78 MMHG | WEIGHT: 154.54 LBS | OXYGEN SATURATION: 100 % | TEMPERATURE: 98.2 F | HEIGHT: 62 IN | HEART RATE: 101 BPM | BODY MASS INDEX: 28.44 KG/M2 | RESPIRATION RATE: 16 BRPM | SYSTOLIC BLOOD PRESSURE: 121 MMHG

## 2020-04-21 DIAGNOSIS — N39.0 URINARY TRACT INFECTION WITHOUT HEMATURIA, SITE UNSPECIFIED: ICD-10-CM

## 2020-04-21 DIAGNOSIS — N89.8 VAGINAL DISCHARGE: ICD-10-CM

## 2020-04-21 LAB
APPEARANCE UR: ABNORMAL
BACTERIA #/AREA URNS HPF: ABNORMAL /HPF
BILIRUB UR QL STRIP.AUTO: NEGATIVE
COLOR UR: YELLOW
EPI CELLS #/AREA URNS HPF: ABNORMAL /HPF
GLUCOSE UR STRIP.AUTO-MCNC: NEGATIVE MG/DL
KETONES UR STRIP.AUTO-MCNC: NEGATIVE MG/DL
LEUKOCYTE ESTERASE UR QL STRIP.AUTO: ABNORMAL
MICRO URNS: ABNORMAL
NITRITE UR QL STRIP.AUTO: POSITIVE
PH UR STRIP.AUTO: 6 [PH] (ref 5–8)
PROT UR QL STRIP: NEGATIVE MG/DL
RBC # URNS HPF: ABNORMAL /HPF
RBC UR QL AUTO: NEGATIVE
SP GR UR REFRACTOMETRY: 1.02
WBC #/AREA URNS HPF: ABNORMAL /HPF

## 2020-04-21 PROCEDURE — 87591 N.GONORRHOEAE DNA AMP PROB: CPT

## 2020-04-21 PROCEDURE — 87491 CHLMYD TRACH DNA AMP PROBE: CPT

## 2020-04-21 PROCEDURE — 99284 EMERGENCY DEPT VISIT MOD MDM: CPT

## 2020-04-21 PROCEDURE — 81001 URINALYSIS AUTO W/SCOPE: CPT

## 2020-04-21 RX ORDER — METRONIDAZOLE 500 MG/1
500 TABLET ORAL 2 TIMES DAILY
Qty: 14 TAB | Refills: 0 | Status: SHIPPED | OUTPATIENT
Start: 2020-04-21 | End: 2020-04-28

## 2020-04-21 RX ORDER — SULFAMETHOXAZOLE AND TRIMETHOPRIM 800; 160 MG/1; MG/1
1 TABLET ORAL 2 TIMES DAILY
Qty: 14 TAB | Refills: 0 | Status: SHIPPED | OUTPATIENT
Start: 2020-04-21 | End: 2020-04-28

## 2020-04-21 SDOH — HEALTH STABILITY: MENTAL HEALTH: HOW MANY STANDARD DRINKS CONTAINING ALCOHOL DO YOU HAVE ON A TYPICAL DAY?: 1 OR 2

## 2020-04-21 SDOH — HEALTH STABILITY: MENTAL HEALTH: HOW OFTEN DO YOU HAVE A DRINK CONTAINING ALCOHOL?: MONTHLY OR LESS

## 2020-04-21 NOTE — DISCHARGE INSTRUCTIONS
You were tested for gonorrhea and chlamydia today.  You are not treated for this you will be called if these are abnormal.

## 2020-04-21 NOTE — ED PROVIDER NOTES
"ED Provider Note    CHIEF COMPLAINT  Chief Complaint   Patient presents with   • Vaginal Discharge     Pt reports foul smell and clear-white d/c x 4 days. Denies vaginal pain, abdominal pain or dysuria.        HPI  Rola Rangel is a 34 y.o. female who presents with 4 days of foul-smelling vaginal discharge.  She has had bacterial vaginosis in the past and this feels similar.  She denies any dysuria or abdominal pain.  No fevers or chills.  She and her boyfriend broke up for a little while and then got back together.  Otherwise no new sexual partners.  She also has a history of gonorrhea and chlamydia.  She denies any bleeding last menstrual period was 2 weeks ago.      REVIEW OF SYSTEMS  Positive for vaginal discharge, negative for dysuria nausea vomiting.     PAST MEDICAL HISTORY   has a past medical history of Anxiety (02/2010) and Depression (02/2010).    SOCIAL HISTORY  Social History     Tobacco Use   • Smoking status: Never Smoker   • Smokeless tobacco: Never Used   Substance and Sexual Activity   • Alcohol use: Yes     Alcohol/week: 0.0 oz     Frequency: Monthly or less     Drinks per session: 1 or 2   • Drug use: Not Currently     Frequency: 2.0 times per week     Types: Cocaine, Methamphetamines, Oral,      Comment: clean x4 months, hx of meth   • Sexual activity: Not Currently     Partners: Male       SURGICAL HISTORY   has a past surgical history that includes repeat c section (5/31/2012).    CURRENT MEDICATIONS  Home Medications    **Home medications have not yet been reviewed for this encounter**         ALLERGIES  Allergies   Allergen Reactions   • Amoxicillin Hives and Swelling     Tolerates ceftriaxone     • Penicillins Hives and Swelling     Tolerates ceftriaxone       PHYSICAL EXAM  VITAL SIGNS: /78   Pulse (!) 101   Temp 36.8 °C (98.2 °F) (Temporal)   Resp 16   Ht 1.575 m (5' 2\")   Wt 70.1 kg (154 lb 8.7 oz)   LMP 04/07/2020 (Within Days)   SpO2 100%   Breastfeeding No   " BMI 28.27 kg/m²   Constitutional: Alert in no apparent distress.  HENT: Normocephalic, Atraumatic, Bilateral external ears normal. Nose normal.   Eyes: Pupils are equal and reactive. Conjunctiva normal, non-icteric.   Lungs: Nonlabored respiration.  : Thin whitish vaginal discharge no CMT or adnexal tenderness  Skin: Warm, Dry, No erythema, No rash.   Neurologic: Alert, Grossly non-focal.   Psychiatric: Affect normal, Judgment normal, Mood normal, Appears appropriate and not intoxicated.   Extremities: Intact distal pulses, No edema, No tenderness    DIAGNOSTIC STUDIES / PROCEDURES  Results for orders placed or performed during the hospital encounter of 04/21/20   URINALYSIS   Result Value Ref Range    Color Yellow     Character Hazy (A)     Ph 6.0 5.0 - 8.0    Glucose Negative Negative mg/dL    Ketones Negative Negative mg/dL    Protein Negative Negative mg/dL    Bilirubin Negative Negative    Nitrite Positive (A) Negative    Leukocyte Esterase Small (A) Negative    Occult Blood Negative Negative    Micro Urine Req Microscopic    REFRACTOMETER SG   Result Value Ref Range    Specific Gravity 1.019    URINE MICROSCOPIC (W/UA)   Result Value Ref Range    WBC  (A) /hpf    RBC 0-2 /hpf    Bacteria Many (A) None /hpf    Epithelial Cells Few Few /hpf   Chlamydia/GC PCR Urine Or Swab   Result Value Ref Range    Source Vaginal          COURSE & MEDICAL DECISION MAKING  Pertinent Labs & Imaging studies reviewed. (See chart for details)  This is a 34-year-old the presents with vaginal discharge.  She has no dysuria no fevers.  On exam she has no CMT or adnexal tenderness.  She does have some thin vaginal discharge.  On urinalysis she has evidence of urinary tract infection with  white cells.  I did send off a vaginal pathogens swab however this has to be  to the other facility before results.  Given that she feels this is likely similar to her prior bacterial vaginosis she will be treated empirically for  this as well as urinary tract infection.  I talked to her about empiric treatment for gonorrhea and chlamydia as she mentioned her boyfriend has symptoms of a possible urinary tract infection but she would like to wait for the results of this before being treated empirically.  She will be given prescriptions for Bactrim and Flagyl and discharged.     The patient will return for new or worsening symptoms and is stable at the time of discharge. Patient was given return precautions. Patient and/or family member verbalizes understanding and will comply.    DISPOSITION:  Patient will be discharged home in stable condition.    FOLLOW UP:  [facility], Emergency Dept  [address]  [place] [address]  [phone]    Return for worsening pain, fevers, vomiting or other concerns        OUTPATIENT MEDICATIONS:  Discharge Medication List as of 4/21/2020  3:47 PM      START taking these medications    Details   sulfamethoxazole-trimethoprim (BACTRIM DS) 800-160 MG tablet Take 1 Tab by mouth 2 times a day for 7 days., Disp-14 Tab, R-0, Print Rx Paper      metroNIDAZOLE (FLAGYL) 500 MG Tab Take 1 Tab by mouth 2 Times a Day for 7 days., Disp-14 Tab, R-0, Print Rx Paper                 FINAL IMPRESSION  1. Vaginal discharge     2. Urinary tract infection without hematuria, site unspecified         2.   3.     This dictation has been creating using voice recognition software. The accuracy of the dictation is limited the abilities of the software.  I expect there may be some errors of grammar and possibly content. I made every attempt to manually correct the errors within my dictation. However errors related to this voice recognition software may still exist and should be interpreted within the appropriate context.        The note accurately reflects work and decisions made by me.  Bhumi Chapin M.D.  4/21/2020  3:58 PM

## 2020-04-21 NOTE — ED NOTES
Patient states recent consensual unprotected sex aand has recently  Had vaginal discahrge and odor. Pt in scant distress but denies flank pain and urinary problems

## 2020-04-21 NOTE — ED TRIAGE NOTES
Chief Complaint   Patient presents with   • Vaginal Discharge     Pt reports foul smell and clear-white d/c x 4 days. Denies vaginal pain, abdominal pain or dysuria.    Pt tachycardic.

## 2020-04-22 LAB
C TRACH DNA SPEC QL NAA+PROBE: NEGATIVE
N GONORRHOEA DNA SPEC QL NAA+PROBE: NEGATIVE
SPECIMEN SOURCE: NORMAL

## 2023-02-21 ENCOUNTER — PRE-ADMISSION TESTING (OUTPATIENT)
Dept: ADMISSIONS | Facility: MEDICAL CENTER | Age: 38
End: 2023-02-21
Attending: COLON & RECTAL SURGERY
Payer: COMMERCIAL

## 2023-02-21 RX ORDER — HYDROMORPHONE HYDROCHLORIDE 1 MG/ML
0.2 INJECTION, SOLUTION INTRAMUSCULAR; INTRAVENOUS; SUBCUTANEOUS
Status: CANCELLED | OUTPATIENT
Start: 2023-02-22

## 2023-02-21 RX ORDER — HYDRALAZINE HYDROCHLORIDE 20 MG/ML
5 INJECTION INTRAMUSCULAR; INTRAVENOUS
Status: CANCELLED | OUTPATIENT
Start: 2023-02-22

## 2023-02-21 RX ORDER — HYDROMORPHONE HYDROCHLORIDE 1 MG/ML
0.1 INJECTION, SOLUTION INTRAMUSCULAR; INTRAVENOUS; SUBCUTANEOUS
Status: CANCELLED | OUTPATIENT
Start: 2023-02-22

## 2023-02-21 RX ORDER — OXYCODONE HCL 5 MG/5 ML
5 SOLUTION, ORAL ORAL
Status: CANCELLED | OUTPATIENT
Start: 2023-02-22

## 2023-02-21 RX ORDER — SODIUM CHLORIDE, SODIUM LACTATE, POTASSIUM CHLORIDE, CALCIUM CHLORIDE 600; 310; 30; 20 MG/100ML; MG/100ML; MG/100ML; MG/100ML
INJECTION, SOLUTION INTRAVENOUS CONTINUOUS
Status: CANCELLED | OUTPATIENT
Start: 2023-02-22

## 2023-02-21 RX ORDER — MEPERIDINE HYDROCHLORIDE 25 MG/ML
12.5 INJECTION INTRAMUSCULAR; INTRAVENOUS; SUBCUTANEOUS
Status: CANCELLED | OUTPATIENT
Start: 2023-02-22

## 2023-02-21 RX ORDER — IPRATROPIUM BROMIDE AND ALBUTEROL SULFATE 2.5; .5 MG/3ML; MG/3ML
3 SOLUTION RESPIRATORY (INHALATION)
Status: CANCELLED | OUTPATIENT
Start: 2023-02-22

## 2023-02-21 RX ORDER — OXYCODONE HCL 5 MG/5 ML
10 SOLUTION, ORAL ORAL
Status: CANCELLED | OUTPATIENT
Start: 2023-02-22

## 2023-02-21 RX ORDER — ONDANSETRON 2 MG/ML
4 INJECTION INTRAMUSCULAR; INTRAVENOUS
Status: CANCELLED | OUTPATIENT
Start: 2023-02-22

## 2023-02-21 RX ORDER — HALOPERIDOL 5 MG/ML
1 INJECTION INTRAMUSCULAR
Status: CANCELLED | OUTPATIENT
Start: 2023-02-22

## 2023-02-21 RX ORDER — HYDROMORPHONE HYDROCHLORIDE 1 MG/ML
0.4 INJECTION, SOLUTION INTRAMUSCULAR; INTRAVENOUS; SUBCUTANEOUS
Status: CANCELLED | OUTPATIENT
Start: 2023-02-22

## 2023-02-21 RX ORDER — DIPHENHYDRAMINE HYDROCHLORIDE 50 MG/ML
12.5 INJECTION INTRAMUSCULAR; INTRAVENOUS
Status: CANCELLED | OUTPATIENT
Start: 2023-02-22

## 2023-02-22 ENCOUNTER — HOSPITAL ENCOUNTER (OUTPATIENT)
Facility: MEDICAL CENTER | Age: 38
End: 2023-02-22
Attending: COLON & RECTAL SURGERY | Admitting: COLON & RECTAL SURGERY
Payer: COMMERCIAL

## 2023-02-22 RX ORDER — SODIUM CHLORIDE, SODIUM LACTATE, POTASSIUM CHLORIDE, CALCIUM CHLORIDE 600; 310; 30; 20 MG/100ML; MG/100ML; MG/100ML; MG/100ML
INJECTION, SOLUTION INTRAVENOUS CONTINUOUS
Status: DISCONTINUED | OUTPATIENT
Start: 2023-02-22 | End: 2023-02-22 | Stop reason: HOSPADM

## 2023-02-22 RX ORDER — ACETAMINOPHEN 500 MG
1000 TABLET ORAL ONCE
Status: DISCONTINUED | OUTPATIENT
Start: 2023-02-22 | End: 2023-02-22 | Stop reason: HOSPADM

## 2023-02-22 NOTE — OR NURSING
Lakshmi MANNING attempted to contact patient by phone for her scheduled pre-surgical appointment. Patient did not answer. Unable to reach patient by phone. Lidia MANNING, contacted Dr. Chaudhari's office to provide update. Informed Yesenia, , that we were unable to reach patient for her pre-surgical visit. Yesenia stated she would inform their surgery scheduler who would communicate to Dr. Chaudhari. Denies further questions at this time.

## 2023-02-22 NOTE — OR NURSING
Called patient to notify her of pending surgery today. Per pt, she was heading in from home to hospital. Called admitting and was told patient checked in then left. Attempted to call patient with no answer, twice. Assuming patient left hospital, Dr. Chaudhari was informed and cancelled surgery for today.

## 2023-06-13 ENCOUNTER — HOSPITAL ENCOUNTER (OUTPATIENT)
Facility: MEDICAL CENTER | Age: 38
End: 2023-06-13
Attending: COLON & RECTAL SURGERY | Admitting: COLON & RECTAL SURGERY
Payer: COMMERCIAL

## 2023-06-14 RX ORDER — SODIUM CHLORIDE, SODIUM LACTATE, POTASSIUM CHLORIDE, CALCIUM CHLORIDE 600; 310; 30; 20 MG/100ML; MG/100ML; MG/100ML; MG/100ML
INJECTION, SOLUTION INTRAVENOUS CONTINUOUS
Status: CANCELLED | OUTPATIENT
Start: 2023-06-14 | End: 2023-06-14

## 2024-07-03 ENCOUNTER — HOSPITAL ENCOUNTER (EMERGENCY)
Facility: MEDICAL CENTER | Age: 39
End: 2024-07-03
Payer: COMMERCIAL

## 2024-07-03 VITALS
SYSTOLIC BLOOD PRESSURE: 121 MMHG | TEMPERATURE: 96.6 F | HEIGHT: 62 IN | DIASTOLIC BLOOD PRESSURE: 80 MMHG | RESPIRATION RATE: 18 BRPM | WEIGHT: 168.43 LBS | HEART RATE: 92 BPM | OXYGEN SATURATION: 99 % | BODY MASS INDEX: 31 KG/M2

## 2024-07-03 PROCEDURE — 302449 STATCHG TRIAGE ONLY (STATISTIC)

## 2024-12-13 ENCOUNTER — HOSPITAL ENCOUNTER (EMERGENCY)
Facility: MEDICAL CENTER | Age: 39
End: 2024-12-13
Attending: STUDENT IN AN ORGANIZED HEALTH CARE EDUCATION/TRAINING PROGRAM
Payer: COMMERCIAL

## 2024-12-13 VITALS
RESPIRATION RATE: 16 BRPM | HEART RATE: 78 BPM | TEMPERATURE: 98.1 F | HEIGHT: 62 IN | OXYGEN SATURATION: 97 % | DIASTOLIC BLOOD PRESSURE: 82 MMHG | SYSTOLIC BLOOD PRESSURE: 144 MMHG | BODY MASS INDEX: 33.84 KG/M2 | WEIGHT: 183.86 LBS

## 2024-12-13 DIAGNOSIS — J06.9 UPPER RESPIRATORY TRACT INFECTION, UNSPECIFIED TYPE: ICD-10-CM

## 2024-12-13 DIAGNOSIS — R05.1 ACUTE COUGH: ICD-10-CM

## 2024-12-13 LAB
FLUAV RNA SPEC QL NAA+PROBE: NEGATIVE
FLUBV RNA SPEC QL NAA+PROBE: NEGATIVE
RSV RNA SPEC QL NAA+PROBE: NEGATIVE
SARS-COV-2 RNA RESP QL NAA+PROBE: NOTDETECTED

## 2024-12-13 PROCEDURE — 0241U HCHG SARS-COV-2 COVID-19 NFCT DS RESP RNA 4 TRGT ED POC: CPT

## 2024-12-13 PROCEDURE — 99282 EMERGENCY DEPT VISIT SF MDM: CPT

## 2024-12-13 ASSESSMENT — LIFESTYLE VARIABLES: DO YOU DRINK ALCOHOL: NO

## 2024-12-14 NOTE — ED NOTES
Pt d/c home ambulatory with family. Pt given d/c instructions and signed d/c paper. Pt educated on follow up plan and medication usage, pt verbalized understanding of d/c instructions. PT vs stable. Pt had all belongings at d/c.

## 2024-12-14 NOTE — ED TRIAGE NOTES
"Chief Complaint   Patient presents with    Cough     Productive cough x3 days.  Seen at [facility] given nasal spray and tessalon pearls without relief.       Pt ambulated to triage. Pt A&Ox4, RA.     Pt to lobby. Pt educated on alerting staff in changes to condition. Pt verbalized understanding. Protocol ordered.     BP (!) 150/85   Pulse 88   Temp 36.3 °C (97.4 °F) (Temporal)   Resp 14   Ht 1.575 m (5' 2\")   Wt 83.4 kg (183 lb 13.8 oz)   SpO2 99%   BMI 33.63 kg/m²     "

## 2024-12-14 NOTE — ED PROVIDER NOTES
ED Provider Note    CHIEF COMPLAINT  Chief Complaint   Patient presents with    Cough     Productive cough x3 days.  Seen at \[facility]\"" clinic given nasal spray and tessalon pearls without relief.       EXTERNAL RECORDS REVIEWED  Inpatient admission discharge summary 1/19/2017 reviewed for medical history, treated at that time for orbital cellulitis.  Patient had a history of polysubstance abuse    HPI/ROS  LIMITATION TO HISTORY   Select: : None  OUTSIDE HISTORIAN(S):  None    Rola Rangel is a 39 y.o. female presenting to the emergency department for a cough.  Symptoms present for the last 3 days.  Endorses congestion and a productive cough.  No chest pain no shortness of breath no fevers or chills.  Patient requesting a COVID flu swab here in the emergency department today.  Her son was seen several months ago over in the peds department for similar symptoms.  Patient denies history of COPD, patient is not a smoker.  No throat pain, difficulty swallowing, speaking.  No ear pain.    PAST MEDICAL HISTORY   has a past medical history of Anxiety (02/2010) and Depression (02/2010).    SURGICAL HISTORY   has a past surgical history that includes repeat c section (5/31/2012).    FAMILY HISTORY  Family History   Problem Relation Age of Onset    Hypertension Mother     Diabetes Maternal Grandmother         pills    Hypertension Maternal Grandmother     Heart Disease Maternal Grandmother     Diabetes Maternal Grandfather         pills    Stroke Maternal Grandfather     Heart Disease Maternal Grandfather     Diabetes Maternal Aunt         pills    Diabetes Maternal Aunt         pills    Cancer Child         neuroblastoma       SOCIAL HISTORY  Social History     Tobacco Use    Smoking status: Never    Smokeless tobacco: Never   Vaping Use    Vaping status: Never Used   Substance and Sexual Activity    Alcohol use: Never    Drug use: Not Currently     Frequency: 2.0 times per week     Types: Cocaine, Methamphetamines,  "Oral,      Comment: hx of meth    Sexual activity: Not Currently     Partners: Male       CURRENT MEDICATIONS  Home Medications       Reviewed by Maday De La Rosa R.N. (Registered Nurse) on 24 at 1739  Med List Status: Partial     Medication Last Dose Status        Patient Ambrosio Taking any Medications                           ALLERGIES  Allergies   Allergen Reactions    Amoxicillin Hives and Swelling     Tolerates ceftriaxone      Penicillins Hives and Swelling     Tolerates ceftriaxone       PHYSICAL EXAM  VITAL SIGNS: BP (!) 150/85   Pulse 88   Temp 36.3 °C (97.4 °F) (Temporal)   Resp 14   Ht 1.575 m (5' 2\")   Wt 83.4 kg (183 lb 13.8 oz)   SpO2 99%   BMI 33.63 kg/m²    General: Well- appearing , non-toxic, no acute distress  Neuro: oriented x 3, moving all extremities.   HEENT:   - Head: Normocephalic, atraumatic  - Eyes: PERRL  Face:-No sinus tenderness to palpation  - Ears/Nose: normal external nose and ears  - Mouth: moist mucosal membranes.  No uvula deviation.  Posterior oropharynx is unremarkable no exudates.  Neck: No rigidity.  No significant adenopathy.  Resp: clear to auscultation, no increased work of breathing no rhonchi or wheeze  CV: Regular rate and rhythm  Abd: Soft, non-tender, non-distended  Extremities: No peripheral edema  Psych: lucid and conversational         DIAGNOSTIC STUDIES / PROCEDURES    EKG  My independent EKG interpretation:  Results for orders placed or performed during the hospital encounter of 17   EKG   Result Value Ref Range    Report       [facility]    Test Date:  2017  Pt Name:    MAREK DAVIS                Department: 61  MRN:        [id number]                      Room:       [facility]  Gender:     F                            Technician: TAYLOR  :        [id number]                   Requested By:MARK BENJAMINT  Order #:    [id number]                    Reading MD: Sabi Lackey MD    Measurements  Intervals                                " Axis  Rate:       74                           P:          -4  MN:         144                          QRS:        94  QRSD:       82                           T:          59  QT:         400  QTc:        444    Interpretive Statements  SINUS RHYTHM  No previous ECG available for comparison    Electronically Signed On 1-6-2017 13:03:04 PST by Sabi Lackey MD         LABS  Results for orders placed or performed during the hospital encounter of 12/13/24   POC CoV-2, FLU A/B, RSV by PCR    Collection Time: 12/13/24  5:52 PM   Result Value Ref Range    POC Influenza A RNA, PCR Negative Negative    POC Influenza B RNA, PCR Negative Negative    POC RSV, by PCR Negative Negative    POC SARS-CoV-2, PCR NotDetected NotDetected       RADIOLOGY  I have independently interpreted the diagnostic imaging associated with this visit and am waiting the final reading from the radiologist.   My preliminary interpretation is as follows:   -   Radiologist interpretation:   No orders to display           MEDICAL DECISION MAKING      ED COURSE AND PLAN    Rola Rangel is a 39 y.o. female senting to the emergency department for a persistent productive cough and congestion ongoing for the last 3 days.  Son has similar symptoms.  No endorsing shortness of breath, chest pain, fever, chills.  On arrival to the emergency department her vital signs are stable she is not septic.  I considered obtaining chest x-ray however patient preferred to avoid any imaging due to cost and really just wanted a COVID swab here in the emergency department COVID flu RSV testing is negative.  Suspect viral upper respiratory infection.  Patient is appropriate for discharge home at this time, strict return precautions discussed.       ---Pertinent ED Course---:    6:31 PM I reviewed the patient's old records in Epic, medication list, allergies, past medical history and performed a physical examination.        Procedures:      ----------------------------------------------------------------------------------  DISCUSSIONS    I have discussed management of the patient with the following physicians and KELLY's:      Discussion of management with other Q or appropriate source(s):     Escalation of care considered, and ultimately not performed: Considered obtaining labs, chest x-ray    Barriers to care at this time, including but not limited to:     Decision tools and prescription drugs considered including, but not limited to: Considered but no indication for steroids, antibiotics.     FINAL IMPRESSION    1. Acute cough    2. Upper respiratory tract infection, unspecified type        New Prescriptions    No medications on file         DISPOSITION    Discharge home, Stable          This chart was dictated using an electronic voice recognition software. The chart has been reviewed and edited but there is still possibility for dictation errors due to limitation of software.    Edgar Frazier DO 12/13/2024

## (undated) DEVICE — PACK MINOR BASIN - (2EA/CA)

## (undated) DEVICE — TRAY SKIN SCRUB PVP WET (20EA/CA) PART #DYND70356 DISCONTINUED

## (undated) DEVICE — TRAY SRGPRP PVP IOD WT PRP - (20/CA)

## (undated) DEVICE — LACTATED RINGERS INJ 1000 ML - (14EA/CA 60CA/PF)

## (undated) DEVICE — SUTURE 2-0 CHROMIC GUT SH 27 (36PK/BX)"

## (undated) DEVICE — SODIUM CHL IRRIGATION 0.9% 1000ML (12EA/CA)

## (undated) DEVICE — VESSELOOP MINI BLUE STERILE - SURG-I-LOOP (10EA/BX)

## (undated) DEVICE — TOWEL STOP TIMEOUT SAFETY FLAG (40EA/CA)

## (undated) DEVICE — SET EXTENSION WITH 2 PORTS (48EA/CA) ***PART #2C8610 IS A SUBSTITUTE*****

## (undated) DEVICE — JELLY SURGILUBE STERILE TUBE 4.25 OZ (1/EA)

## (undated) DEVICE — GOWN WARMING STANDARD FLEX - (30/CA)

## (undated) DEVICE — GLOVE BIOGEL PI INDICATOR SZ 7.5 SURGICAL PF LF -(50/BX 4BX/CA)

## (undated) DEVICE — ELECTRODE DUAL RETURN W/ CORD - (50/PK)

## (undated) DEVICE — BRIEF STRETCH MATERNITY M/L - FITS 20-60IN (5EA/BG 20BG/CA)

## (undated) DEVICE — SUTURE GENERAL

## (undated) DEVICE — SLEEVE, VASO, THIGH, MED

## (undated) DEVICE — COVER LIGHT HANDLE ALC PLUS DISP (18EA/BX)

## (undated) DEVICE — CANISTER SUCTION 3000ML MECHANICAL FILTER AUTO SHUTOFF MEDI-VAC NONSTERILE LF DISP  (40EA/CA)

## (undated) DEVICE — GLOVE SZ 7 BIOGEL PI MICRO - PF LF (50PR/BX 4BX/CA)

## (undated) DEVICE — SUTURE 2-0 SILK 12 X 18" (36PK/BX)"

## (undated) DEVICE — GAUZE FLUFF STERILE 2-PLY 36 X 36 (100EA/CA)

## (undated) DEVICE — SENSOR OXIMETER ADULT SPO2 RD SET (20EA/BX)

## (undated) DEVICE — SUCTION INSTRUMENT YANKAUER BULBOUS TIP W/O VENT (50EA/CA)

## (undated) DEVICE — SET LEADWIRE 5 LEAD BEDSIDE DISPOSABLE ECG (1SET OF 5/EA)

## (undated) DEVICE — TUBING CLEARLINK DUO-VENT - C-FLO (48EA/CA)